# Patient Record
Sex: MALE | Race: WHITE | NOT HISPANIC OR LATINO | Employment: OTHER | URBAN - METROPOLITAN AREA
[De-identification: names, ages, dates, MRNs, and addresses within clinical notes are randomized per-mention and may not be internally consistent; named-entity substitution may affect disease eponyms.]

---

## 2017-01-24 ENCOUNTER — OFFICE VISIT (OUTPATIENT)
Dept: OPTOMETRY | Facility: CLINIC | Age: 67
End: 2017-01-24
Payer: MEDICARE

## 2017-01-24 DIAGNOSIS — H52.4 MYOPIA WITH ASTIGMATISM AND PRESBYOPIA, BILATERAL: ICD-10-CM

## 2017-01-24 DIAGNOSIS — H02.88A MEIBOMIAN GLAND DYSFUNCTION (MGD), BILATERAL, BOTH UPPER AND LOWER LIDS: ICD-10-CM

## 2017-01-24 DIAGNOSIS — Z13.5 GLAUCOMA SCREENING: ICD-10-CM

## 2017-01-24 DIAGNOSIS — H52.13 MYOPIA WITH ASTIGMATISM AND PRESBYOPIA, BILATERAL: ICD-10-CM

## 2017-01-24 DIAGNOSIS — H02.88B MEIBOMIAN GLAND DYSFUNCTION (MGD), BILATERAL, BOTH UPPER AND LOWER LIDS: ICD-10-CM

## 2017-01-24 DIAGNOSIS — H25.13 NUCLEAR SCLEROSIS, BILATERAL: ICD-10-CM

## 2017-01-24 DIAGNOSIS — H52.203 MYOPIA WITH ASTIGMATISM AND PRESBYOPIA, BILATERAL: ICD-10-CM

## 2017-01-24 DIAGNOSIS — I10 ESSENTIAL HYPERTENSION: Primary | ICD-10-CM

## 2017-01-24 PROCEDURE — 99499 UNLISTED E&M SERVICE: CPT | Mod: S$GLB,,, | Performed by: OPTOMETRIST

## 2017-01-24 PROCEDURE — 92014 COMPRE OPH EXAM EST PT 1/>: CPT | Mod: S$GLB,,, | Performed by: OPTOMETRIST

## 2017-01-24 PROCEDURE — 99999 PR PBB SHADOW E&M-EST. PATIENT-LVL II: CPT | Mod: PBBFAC,,, | Performed by: OPTOMETRIST

## 2017-01-24 PROCEDURE — 92015 DETERMINE REFRACTIVE STATE: CPT | Mod: S$GLB,,, | Performed by: OPTOMETRIST

## 2017-01-24 NOTE — PROGRESS NOTES
HPI     Hypertensive eye exam   MURRAY 01/20/2016   Mr. Dalton is here today to continue ocular health care for hypertension.  Pt sts has noticed decline in reading va OS since last eye exam also more   dryness than usual.  Glasses 1 yr old.  (-)Flashes (+)Floaters  (+)Itch, (+)tear, (+)burn, (+)Dryness.  (+) OTC Drops unknown AT's natural   tears?  (+)Photophobia  (-)Glare       Last edited by Filiberto Varela, OD on 1/24/2017  1:29 PM.         Assessment /Plan     For exam results, see Encounter Report.    Essential hypertension  -No retinopathy noted today.  Continued control with primary care physician and annual comprehensive eye exam.    Nuclear sclerosis, bilateral  -Educated patient on presence of cataracts at today's exam, monitor at annual dilated fundus exam. 8+ years surgical estimate.    Meibomian gland dysfunction (MGD), bilateral, both upper and lower lids  -Sysatne PRN    Glaucoma screening  -Monitor with annual eye exam and IOP check    Myopia with astigmatism and presbyopia, bilateral  Eyeglass Final Rx     Eyeglass Final Rx      Sphere Cylinder Axis Add   Right -1.75 +1.50 180 +2.50   Left -1.75 +1.00 180 +2.50       Type:  PAL    Expiration Date:  1/25/2018                  RTC 1 yr

## 2017-01-24 NOTE — MR AVS SNAPSHOT
[facility]  [address] [facility]  [place] [address]  Phone: [phone]  Fax: [phone]                  Sha Triplett   2017 1:00 PM   Office Visit    Description:  Male : 1950   Provider:  Filiberto Varela OD   Department:  [facility]           Reason for Visit     Hypertensive Eye Exam           Diagnoses this Visit        Comments    Essential hypertension    -  Primary     Nuclear sclerosis, bilateral         Meibomian gland dysfunction (MGD), bilateral, both upper and lower lids         Glaucoma screening         Myopia with astigmatism and presbyopia, bilateral                To Do List           Goals (5 Years of Data)     None      Follow-Up and Disposition     Return in about 1 year (around 2018).      [facility] On Call     [facility] On Call Nurse Care Line -  Assistance  Registered nurses in the [facility] On Call Center provide clinical advisement, health education, appointment booking, and other advisory services.  Call for this free service at [phone].             Medications           Message regarding Medications     Verify the changes and/or additions to your medication regime listed below are the same as discussed with your clinician today.  If any of these changes or additions are incorrect, please notify your healthcare provider.        STOP taking these medications     arm brace (WRIST BRACE MEDIUM) Misc 1 each by Misc.(Non-Drug; Combo Route) route as needed (when experiencing left wrist pain).    hydrOXYzine (ATARAX) 10 MG Tab Take 25 mg by mouth 3 (three) times daily as needed.    benazepril (LOTENSIN) 10 MG tablet Take 5 mg by mouth once daily. Pt. States he takes 1/2 tab of 10 mg, ie, 5 mg if his Systolic B/P is > 140 or 150.    ranitidine (ZANTAC) 300 MG capsule Take 300 mg by mouth every evening.           Verify that the below list of medications is an accurate representation of the medications you are currently taking.  If none reported,  the list may be blank. If incorrect, please contact your healthcare provider. Carry this list with you in case of emergency.           Current Medications     b complex vitamins tablet Take 1 tablet by mouth once daily.    cyanocobalamin, vitamin B-12, (VITAMIN B-12) 50 mcg tablet Take 50 mcg by mouth once daily.    fish oil-omega-3 fatty acids 300-1,000 mg capsule Take 2 g by mouth once daily.    milk thistle 175 mg tablet Take 175 mg by mouth once daily.    multivitamin capsule Take 1 capsule by mouth once daily.    PSYLLIUM SEED, WITH DEXTROSE, (CILIUM ORAL) Take by mouth.           Clinical Reference Information           Allergies as of 1/24/2017     No Known Allergies      Immunizations Administered on Date of Encounter - 1/24/2017     None      [facility] Sign-Up     Activating your [facility] account is as easy as 1-2-3!     1) Visit [email or web address], select Sign Up Now, enter this activation code and your date of birth, then select Next.  [email or web address]  Expires: 3/10/2017  1:30 PM      2) Create a username and password to use when you visit [facility] in the future and select a security question in case you lose your password and select Next.    3) Enter your e-mail address and click Sign Up!    Additional Information  If you have questions, please e-mail [email or web address] or call [phone] to talk to our [facility] staff. Remember, [facility] is NOT to be used for urgent needs. For medical emergencies, dial 911.

## 2017-02-06 ENCOUNTER — TELEPHONE (OUTPATIENT)
Dept: FAMILY MEDICINE | Facility: CLINIC | Age: 67
End: 2017-02-06

## 2017-02-06 NOTE — TELEPHONE ENCOUNTER
----- Message from Verito Connor sent at 2/6/2017  1:06 PM CST -----  Patient states he has been having ringing in his ear. He would like to know if he needs to see a specialist. Please call at [phone] thank you!

## 2017-02-06 NOTE — TELEPHONE ENCOUNTER
There are different causes to ringing in th ears. Most of the time it is due to damage in the ear. If he would like to be evaluated by a specialist and needs a referral, he would need an appointment. I have not seen him since 2/2015

## 2017-02-10 ENCOUNTER — OFFICE VISIT (OUTPATIENT)
Dept: FAMILY MEDICINE | Facility: CLINIC | Age: 67
End: 2017-02-10
Payer: MEDICARE

## 2017-02-10 VITALS
BODY MASS INDEX: 24.77 KG/M2 | DIASTOLIC BLOOD PRESSURE: 80 MMHG | HEART RATE: 65 BPM | SYSTOLIC BLOOD PRESSURE: 120 MMHG | WEIGHT: 176.94 LBS | TEMPERATURE: 98 F | OXYGEN SATURATION: 99 % | HEIGHT: 71 IN

## 2017-02-10 DIAGNOSIS — H93.13 TINNITUS AURIUM, BILATERAL: ICD-10-CM

## 2017-02-10 DIAGNOSIS — B18.2 CHRONIC HEPATITIS C WITHOUT HEPATIC COMA: ICD-10-CM

## 2017-02-10 DIAGNOSIS — I10 ESSENTIAL HYPERTENSION: Primary | ICD-10-CM

## 2017-02-10 PROCEDURE — 99213 OFFICE O/P EST LOW 20 MIN: CPT | Mod: S$GLB,,, | Performed by: FAMILY MEDICINE

## 2017-02-10 PROCEDURE — 3079F DIAST BP 80-89 MM HG: CPT | Mod: S$GLB,,, | Performed by: FAMILY MEDICINE

## 2017-02-10 PROCEDURE — 1157F ADVNC CARE PLAN IN RCRD: CPT | Mod: S$GLB,,, | Performed by: FAMILY MEDICINE

## 2017-02-10 PROCEDURE — 99999 PR PBB SHADOW E&M-EST. PATIENT-LVL III: CPT | Mod: PBBFAC,,, | Performed by: FAMILY MEDICINE

## 2017-02-10 PROCEDURE — 3074F SYST BP LT 130 MM HG: CPT | Mod: S$GLB,,, | Performed by: FAMILY MEDICINE

## 2017-02-10 PROCEDURE — 1159F MED LIST DOCD IN RCRD: CPT | Mod: S$GLB,,, | Performed by: FAMILY MEDICINE

## 2017-02-10 PROCEDURE — 1160F RVW MEDS BY RX/DR IN RCRD: CPT | Mod: S$GLB,,, | Performed by: FAMILY MEDICINE

## 2017-02-10 PROCEDURE — 99499 UNLISTED E&M SERVICE: CPT | Mod: S$GLB,,, | Performed by: FAMILY MEDICINE

## 2017-02-10 NOTE — PROGRESS NOTES
Routine Office Visit    Patient Name: Sha Triplett    : 1950  MRN: [id number]    Subjective:  Sha is a 66 y.o. male who presents today for     1. Tinnitus - started 1 week ago - pt states that he is unsure exactly when it started, but pt noticed it more approximately 1 week ago. Pt states that he notices the ringing more when it is quiet. Pt does not recall any recent trauma / injuries. 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 Pt states she sometimes feels his ear is clogged with fluid. He has been using alcohol in his ears to decrease the fluid. It seems to help a little. No trauma / injuries.     Review of Systems   Constitutional: Negative for chills and fever.   HENT: Negative for congestion.    Eyes: Negative for blurred vision.   Respiratory: Negative for cough.    Cardiovascular: Negative for chest pain.   Gastrointestinal: Negative for abdominal pain, constipation, diarrhea, heartburn, nausea and vomiting.   Genitourinary: Negative for dysuria.   Musculoskeletal: Negative for myalgias.   Skin: Negative for itching and rash.   Neurological: Negative for dizziness and headaches.   Psychiatric/Behavioral: Negative for depression.       Active Problem List  Patient Active Problem List   Diagnosis    Hypertension    Chronic hepatitis C without hepatic coma    Other symptoms involving digestive system(027.99)       Past Surgical History  Past Surgical History   Procedure Laterality Date    Liver biopsy      Colonoscopy w/ biopsies  3/4/15     / Dr. Ellis at [facility].       Family History  Family History   Problem Relation Age of Onset    Alzheimer's disease Mother     Macular degeneration Father        Social History  Social History     Social History    Marital status:      Spouse name: N/A    Number of children: N/A    Years of education: N/A     Occupational History    Not on file.     Social History Main Topics    Smoking status: Former Smoker     Packs/day: 0.25     "Smokeless tobacco: Not on file      Comment: 1 cigarette daily    Alcohol use No    Drug use: Not on file    Sexual activity: Not on file     Other Topics Concern    Not on file     Social History Narrative       Medications and Allergies  Reviewed and updated.       Physical Exam  Visit Vitals    /80 (BP Location: Right arm, Patient Position: Sitting, BP Method: Manual)    Pulse 65    Temp 97.8 °F (36.6 °C) (Oral)    Ht 5' 11" (1.803 m)    Wt 80.3 kg (176 lb 14.7 oz)    SpO2 99%    BMI 24.68 kg/m2     Physical Exam   Constitutional: He is oriented to person, place, and time. He appears well-developed and well-nourished.   HENT:   Head: Normocephalic and atraumatic.   Eyes: Conjunctivae and EOM are normal. Pupils are equal, round, and reactive to light.   Neck: Normal range of motion. Neck supple. No JVD present. No thyromegaly present.   Cardiovascular: Normal rate, regular rhythm and normal heart sounds.    Pulmonary/Chest: Effort normal and breath sounds normal. He has no wheezes.   Abdominal: Soft. Bowel sounds are normal. He exhibits no distension. There is no tenderness. There is no guarding.   Musculoskeletal: Normal range of motion.   Lymphadenopathy:     He has no cervical adenopathy.   Neurological: He is alert and oriented to person, place, and time.   Skin: Skin is warm and dry.   Psychiatric: He has a normal mood and affect. His behavior is normal.         Assessment/Plan:  Sha Triplett is a 66 y.o. male who presents today for :    Essential hypertension  The current medical regimen is effective;  continue present plan and medications.    Chronic hepatitis C without hepatic coma  Chronic condition for patient   Pt does not want harvoni treatment at this time     Tinnitus aurium, bilateral  Recommend to use white noise  Recommend to use antihistamine such as allegra, claritin, or zyrtec for nasal congestion / serous otitis media  May need referral to ENT if symptoms worsen / " progress        Return if symptoms worsen or fail to improve.

## 2017-02-10 NOTE — MR AVS SNAPSHOT
"    [facility]  [address] [facility]  [place] [address]  Phone: [phone]  Fax: [phone]                  Sha Triplett   2/10/2017 11:00 AM   Office Visit    Description:  Male : 1950   Provider:  Kirsten Portillo MD   Department:  [facility]           Reason for Visit     Ear Problem                To Do List           Goals (5 Years of Data)     None      [facility]     [facility] Nurse Care [facility] -  Assistance  Registered nurses in the [facility] On Call Center provide clinical advisement, health education, appointment booking, and other advisory services.  Call for this free service at [phone].             Medications           Message regarding Medications     Verify the changes and/or additions to your medication regime listed below are the same as discussed with your clinician today.  If any of these changes or additions are incorrect, please notify your healthcare provider.             Verify that the below list of medications is an accurate representation of the medications you are currently taking.  If none reported, the list may be blank. If incorrect, please contact your healthcare provider. Carry this list with you in case of emergency.           Current Medications     b complex vitamins tablet Take 1 tablet by mouth once daily.    cyanocobalamin, vitamin B-12, (VITAMIN B-12) 50 mcg tablet Take 50 mcg by mouth once daily.    fish oil-omega-3 fatty acids 300-1,000 mg capsule Take 2 g by mouth once daily.    milk thistle 175 mg tablet Take 175 mg by mouth once daily.    multivitamin capsule Take 1 capsule by mouth once daily.    PSYLLIUM SEED, WITH DEXTROSE, (CILIUM ORAL) Take by mouth.           Clinical Reference Information           Your Vitals Were     BP Pulse Temp Height    120/80 (BP Location: Right arm, Patient Position: Sitting, BP Method: Manual) 65 97.8 °F (36.6 °C) (Oral) 5' 11" (1.803 m)    Weight SpO2 BMI    80.3 kg (176 lb 14.7 " oz) 99% 24.68 kg/m2      Blood Pressure          Most Recent Value    BP  120/80      Allergies as of 2/10/2017     No Known Allergies      Immunizations Administered on Date of Encounter - 2/10/2017     None      [facility] Sign-Up     Activating your [facility] account is as easy as 1-2-3!     1) Visit [email or web address], select Sign Up Now, enter this activation code and your date of birth, then select Next.  [email or web address]  Expires: 3/10/2017  1:30 PM      2) Create a username and password to use when you visit [facility] in the future and select a security question in case you lose your password and select Next.    3) Enter your e-mail address and click Sign Up!    Additional Information  If you have questions, please e-mail [email or web address] or call [phone] to talk to our [facility] staff. Remember, [facility] is NOT to be used for urgent needs. For medical emergencies, dial 911.         Instructions      Tinnitus (Ringing in the Ears)     Treatment may include maskers and hearing aids.     Tinnitus is the term for a noise in your ear not caused by an outside sound. The noise might be a ringing, clicking, hiss, or roar. It can vary in pitch and may be soft or quite loud. For some people, tinnitus is a minor nuisance. But for others, the noise can make it hard to hear, work, and even sleep. When tinnitus can't be cured, a number of treatments may offer relief.  What causes tinnitus?  Loud noises, hearing loss, and ear wax can cause tinnitus. So can certain medicines. Large amounts of aspirin or caffeine are sometimes to blame. In many cases, the exact cause of tinnitus is unknown.  How is tinnitus treated?  Identifying and removing the cause is the best way to treat tinnitus. For that reason, your healthcare provider may refer you to an otolaryngologist (ear, nose, and throat doctor). Your hearing may also be checked by an audiologist (hearing specialist). If you have hearing loss, wearing a hearing aid may  help your tinnitus. When the cause can't be found, the tinnitus itself may be treated. Some of the treatments are listed below, and your healthcare provider can tell you more about them:  · Maskers are small devices that look like hearing aids. They emit a pleasant sound that helps cover up the ringing in your ears. Hearing aids and maskers are sometimes used together.  · Medicines that treat anxiety and depression may ease tinnitus in some people.  · Hypnosis or relaxation therapy may help head noise seem less severe.  · Tinnitus retraining therapy combines counseling and maskers. Both can help take your mind off the tinnitus.  For more information  · American Speech-Hearing-Language Association 972-596-8523 www.ruthie.org  · American Tinnitus Association 404-069-2843 www.iglesia.org  · National [place] on Deafness and other Communication Disorders 524-407-0064 www.nidcd.nih.gov   Date Last Reviewed: 7/1/2016  © 9131-5958 [employer]. [address] [place] [address]. All rights reserved. This information is not intended as a substitute for professional medical care. Always follow your healthcare professional's instructions.             Language Assistance Services     ATTENTION: Language assistance services are available, free of charge. Please call [phone].      ATENCIÓN: Si habla tanoañol, tiene a alejo disposición servicios gratuitos de asistencia lingüística. Llame al [phone].     TACHO Ý: N?u b?n nói Ti?ng Vi?t, có các d?ch v? h? tr? ngôn ng? mi?n phí dành cho b?n. G?i s? [phone].         [facility] complies with applicable Federal civil rights laws and does not discriminate on the basis of race, color, national origin, age, disability, or sex.

## 2017-02-10 NOTE — PATIENT INSTRUCTIONS
Tinnitus (Ringing in the Ears)     Treatment may include maskers and hearing aids.     Tinnitus is the term for a noise in your ear not caused by an outside sound. The noise might be a ringing, clicking, hiss, or roar. It can vary in pitch and may be soft or quite loud. For some people, tinnitus is a minor nuisance. But for others, the noise can make it hard to hear, work, and even sleep. When tinnitus can't be cured, a number of treatments may offer relief.  What causes tinnitus?  Loud noises, hearing loss, and ear wax can cause tinnitus. So can certain medicines. Large amounts of aspirin or caffeine are sometimes to blame. In many cases, the exact cause of tinnitus is unknown.  How is tinnitus treated?  Identifying and removing the cause is the best way to treat tinnitus. For that reason, your healthcare provider may refer you to an otolaryngologist (ear, nose, and throat doctor). Your hearing may also be checked by an audiologist (hearing specialist). If you have hearing loss, wearing a hearing aid may help your tinnitus. When the cause can't be found, the tinnitus itself may be treated. Some of the treatments are listed below, and your healthcare provider can tell you more about them:  · Maskers are small devices that look like hearing aids. They emit a pleasant sound that helps cover up the ringing in your ears. Hearing aids and maskers are sometimes used together.  · Medicines that treat anxiety and depression may ease tinnitus in some people.  · Hypnosis or relaxation therapy may help head noise seem less severe.  · Tinnitus retraining therapy combines counseling and maskers. Both can help take your mind off the tinnitus.  For more information  · American Speech-Hearing-Language Association 928-225-1854 www.ruthie.org  · American Tinnitus Association 412-711-7053 www.iglesia.org  · National [place] on Deafness and other Communication Disorders 321-772-7244 www.nidcd.nih.gov   Date Last Reviewed: 7/1/2016  ©  5136-1912 [employer]. [address] [place] [address], [place] [address]. All rights reserved. This information is not intended as a substitute for professional medical care. Always follow your healthcare professional's instructions.

## 2017-02-27 ENCOUNTER — TELEPHONE (OUTPATIENT)
Dept: FAMILY MEDICINE | Facility: CLINIC | Age: 67
End: 2017-02-27

## 2017-02-27 DIAGNOSIS — H93.19 TINNITUS, UNSPECIFIED LATERALITY: Primary | ICD-10-CM

## 2017-02-27 NOTE — TELEPHONE ENCOUNTER
----- Message from Refugio Fontenot sent at 2/27/2017 11:23 AM CST -----  Pt states on his last visit with dr tsai, if he still having ringing in his ears that she can refer him to a specialist. The pt asked that someone call him in ref of that.

## 2017-03-06 ENCOUNTER — TELEPHONE (OUTPATIENT)
Dept: FAMILY MEDICINE | Facility: CLINIC | Age: 67
End: 2017-03-06

## 2017-03-06 ENCOUNTER — OFFICE VISIT (OUTPATIENT)
Dept: OPTOMETRY | Facility: CLINIC | Age: 67
End: 2017-03-06
Payer: MEDICARE

## 2017-03-06 DIAGNOSIS — H52.13 MYOPIA WITH ASTIGMATISM AND PRESBYOPIA, BILATERAL: Primary | ICD-10-CM

## 2017-03-06 DIAGNOSIS — H52.4 MYOPIA WITH ASTIGMATISM AND PRESBYOPIA, BILATERAL: Primary | ICD-10-CM

## 2017-03-06 DIAGNOSIS — H52.203 MYOPIA WITH ASTIGMATISM AND PRESBYOPIA, BILATERAL: Primary | ICD-10-CM

## 2017-03-06 PROCEDURE — 99999 PR PBB SHADOW E&M-EST. PATIENT-LVL II: CPT | Mod: PBBFAC,,, | Performed by: OPTOMETRIST

## 2017-03-06 PROCEDURE — 99499 UNLISTED E&M SERVICE: CPT | Mod: S$GLB,,, | Performed by: OPTOMETRIST

## 2017-03-06 NOTE — TELEPHONE ENCOUNTER
Patient has an appointment 3/8/17 2:30pm with Dr. Pagan at [facility], patient was notified.

## 2017-03-06 NOTE — PROGRESS NOTES
HPI     Concerns About Ocular Health    Additional comments: Glasses check           Comments   DLS: 01/24/2017 Dr. Varela    Patient states he has been having problems seeing out of his new glasses.   He states it has been a couple of weeks since he has been using the specs.   Can't see well at a distance at night. He says he can see clearer out of   the older pair of glasses. He states he had a the  on the WB to   read his glasses and everything checked out fine with the RX. He was told   to give it a few weeks and to make an appt if he is still having problems.          Last edited by Ena Pantoja on 3/6/2017  2:04 PM. (History)            Assessment /Plan     For exam results, see Encounter Report.    Myopia with astigmatism and presbyopia, bilateral  Eyeglass Final Rx     Eyeglass Final Rx      Sphere Cylinder Axis Add   Right -2.00 +1.75 180 +2.50   Left -2.00 +1.00 180 +2.50       Type:  Bifocal    Expiration Date:  3/7/2018    Robyn Gandhi                  [facility] annual

## 2017-03-06 NOTE — TELEPHONE ENCOUNTER
----- Message from Edel Herndon sent at 3/6/2017  8:34 AM CST -----  Contact: gama  Pt is requesting a referral for ringing in ear per last OV. [phone]            Thanks

## 2017-03-22 ENCOUNTER — OFFICE VISIT (OUTPATIENT)
Dept: FAMILY MEDICINE | Facility: CLINIC | Age: 67
End: 2017-03-22
Payer: MEDICARE

## 2017-03-22 ENCOUNTER — HOSPITAL ENCOUNTER (OUTPATIENT)
Dept: RADIOLOGY | Facility: HOSPITAL | Age: 67
Discharge: HOME | End: 2017-03-22
Attending: NURSE PRACTITIONER
Payer: MEDICARE

## 2017-03-22 ENCOUNTER — TELEPHONE (OUTPATIENT)
Dept: FAMILY MEDICINE | Facility: CLINIC | Age: 67
End: 2017-03-22

## 2017-03-22 VITALS
HEART RATE: 71 BPM | SYSTOLIC BLOOD PRESSURE: 130 MMHG | HEIGHT: 71 IN | OXYGEN SATURATION: 99 % | WEIGHT: 175.81 LBS | BODY MASS INDEX: 24.61 KG/M2 | TEMPERATURE: 98 F | DIASTOLIC BLOOD PRESSURE: 80 MMHG

## 2017-03-22 DIAGNOSIS — K59.00 CONSTIPATION, UNSPECIFIED CONSTIPATION TYPE: ICD-10-CM

## 2017-03-22 DIAGNOSIS — K59.00 CONSTIPATION, UNSPECIFIED CONSTIPATION TYPE: Primary | ICD-10-CM

## 2017-03-22 PROCEDURE — 1160F RVW MEDS BY RX/DR IN RCRD: CPT | Mod: S$GLB,,, | Performed by: NURSE PRACTITIONER

## 2017-03-22 PROCEDURE — 74020 XR ABDOMEN FLAT AND ERECT: CPT | Mod: TC,PO

## 2017-03-22 PROCEDURE — 1159F MED LIST DOCD IN RCRD: CPT | Mod: S$GLB,,, | Performed by: NURSE PRACTITIONER

## 2017-03-22 PROCEDURE — 99999 PR PBB SHADOW E&M-EST. PATIENT-LVL III: CPT | Mod: PBBFAC,,, | Performed by: NURSE PRACTITIONER

## 2017-03-22 PROCEDURE — 1126F AMNT PAIN NOTED NONE PRSNT: CPT | Mod: S$GLB,,, | Performed by: NURSE PRACTITIONER

## 2017-03-22 PROCEDURE — 99214 OFFICE O/P EST MOD 30 MIN: CPT | Mod: S$GLB,,, | Performed by: NURSE PRACTITIONER

## 2017-03-22 PROCEDURE — 1157F ADVNC CARE PLAN IN RCRD: CPT | Mod: S$GLB,,, | Performed by: NURSE PRACTITIONER

## 2017-03-22 PROCEDURE — 74020 XR ABDOMEN FLAT AND ERECT: CPT | Mod: 26,,, | Performed by: RADIOLOGY

## 2017-03-22 PROCEDURE — 3075F SYST BP GE 130 - 139MM HG: CPT | Mod: S$GLB,,, | Performed by: NURSE PRACTITIONER

## 2017-03-22 PROCEDURE — 3079F DIAST BP 80-89 MM HG: CPT | Mod: S$GLB,,, | Performed by: NURSE PRACTITIONER

## 2017-03-22 NOTE — PATIENT INSTRUCTIONS
Constipation (Adult)  Constipation means that you have bowel movements that are less frequent than usual. Stools often become very hard and difficult to pass.  Constipation is very common. At some point in life it affects almost everyone. Since everyone's bowel habits are different, what is constipation to one person may not be to another. Your healthcare provider may do tests to diagnose constipation. It depends on what he or she finds when evaluating you.    Symptoms of constipation include:  · Abdominal pain  · Bloating  · Vomiting  · Painful bowel movements  · Itching, swelling, bleeding, or pain around the anus  Causes  Constipation can have many causes. These include:  · Diet low in fiber  · Too much dairy  · Not drinking enough liquids  · Lack of exercise or physical activity. This is especially true for older adults.  · Changes in lifestyle or daily routine, including pregnancy, aging, work, and travel  · Frequent use or misuse of laxatives  · Ignoring the urge to have a bowel movement or delaying it until later  · Medicines, such as certain prescription pain medicines, iron supplements, antacids, certain antidepressants, and calcium supplements  · Diseases like irritable bowel syndrome, bowel obstructions, stroke, diabetes, thyroid disease, Parkinson disease, hemorrhoids, and colon cancer  Complications  Potential complications of constipation can include:  · Hemorrhoids  · Rectal bleeding from hemorrhoids or anal fissures (skin tears)  · Hernias  · Dependency on laxatives  · Chronic constipation  · Fecal impaction  · Bowel obstruction or perforation  Home care  All treatment should be done after talking with your healthcare provider. This is especially true if you have another medical problems, are taking prescription medicines, or are an older adult. Treatment most often involves lifestyle changes. You may also need medicines. Your healthcare provider will tell you which will work best for you. Follow  the advice below to help avoid this problem in the future.  Lifestyle changes  These lifestyle changes can help prevent constipation:  · Diet. Eat a high-fiber diet, with fresh fruit and vegetables, and reduce dairy intake, meats, and processed foods  · Fluids. It's important to get enough fluids each day. Drink plenty of water when you eat more fiber. If you are on diet that limits the amount of fluid you can have, talk about this with your healthcare provider.  · Regular exercise. Check with your healthcare provider first.  Medications  Take any medicines as directed. Some laxatives are safe to use only every now and then. Others can be taken on a regular basis. Talk with your doctor or pharmacist if you have questions.  Prescription pain medicines can cause constipation. If you are taking this kind of medicine, ask your healthcare provider if you should also take a stool softener.  Medicines you may take to treat constipation include:  · Fiber supplements  · Stool softeners  · Laxatives  · Enemas  · Rectal suppositories  Follow-up care  Follow up with your healthcare provider if symptoms don't get better in the next few days. You may need to have more tests or see a specialist.  Call 911  Call 911 if any of these occur:  · Trouble breathing  · Stiff, rigid abdomen that is severely painful to touch  · Confusion  · Fainting or loss of consciousness  · Rapid heart rate  · Chest pain  When to seek medical advice  Call your healthcare provider right away if any of these occur:  · Fever over 100.4°F (38°C)  · Failure to resume normal bowel movements  · Pain in your abdomen or back gets worse  · Nausea or vomiting  · Swelling in your abdomen  · Blood in the stool  · Black, tarry stool  · Involuntary weight loss  · Weakness  Date Last Reviewed: 12/30/2015  © 6876-6285 [employer]. [address] [place] [address], [place] [address]. All rights reserved. This information is not intended as a substitute for  professional medical care. Always follow your healthcare professional's instructions.        Treating Constipation    Constipation is a common and often uncomfortable problem. Constipation means you have bowel movements fewer than 3 times per week, or strain to pass hard, dry stool. It can last a short time. Or it can be a problem that never seems to go away. The good news is that it can often be treated and controlled.  Eat more fiber  One of the best ways to help treat constipation is to increase your fiber intake. You can do this either through diet or by using fiber supplements. Fiber (in whole grains, fruits, and vegetables) adds bulk and absorbs water to soften the stool. This helps the stool pass through the colon more easily. When you increase your fiber intake, do it slowly to avoid side effects such as bloating. Also increase the amount of water that you drink. Eating more of the following foods can add fiber to your diet.  · High-fiber cereals  · Whole grains, bran, and brown rice  · Vegetables such as carrots, broccoli, and greens  · Fresh fruits (especially apples, pears, and dried fruits like raisins and apricots)  · Nuts and legumes (especially beans such as lentils, kidney beans, and lima beans)  Get physically active  Exercise helps improve the working of your colon which helps ease constipation. Try to get some physical activity every day. If you havent been active for a while, talk to your healthcare provider before starting again.  Laxatives  Your healthcare provider may suggest an over-the-counter product to help ease your constipation. He or she may suggest the use of bulk-forming agents or laxatives. The use of laxatives, if used as directed, is common and safe. Follow directions carefully when using them. See your healthcare provider for new-onset constipation, or long-term constipation, to rule out other causes such as medicines or thyroid disease.  Date Last Reviewed: 7/1/2016  © 7327-8987  [employer]. [address] [place] [address], [place] [address]. All rights reserved. This information is not intended as a substitute for professional medical care. Always follow your healthcare professional's instructions.        Eating a High-Fiber Diet  Fiber is what gives strength and structure to plants. Most grains, beans, vegetables, and fruits contain fiber. Foods rich in fiber are often low in calories and fat, and they fill you up more. They may also reduce your risks for certain health problems. To find out the amount of fiber in canned, packaged, or frozen foods, read the Nutrition Facts label. It tells you how much fiber is in a serving.    Types of fiber and their benefits  There are two types of fiber: insoluble and soluble. They both aid digestion and help you maintain a healthy weight.  · Insoluble fiber. This is found in whole grains, cereals, certain fruits and vegetables such as apple skin, corn, and carrots. Insoluble fiber may prevent constipation and reduce the risk for certain types of cancer.  · Soluble fiber. This type of fiber is in oats, beans, and certain fruits and vegetables such as strawberries and peas. Soluble fiber can reduce cholesterol, which may help lower the risk for heart disease. It also helps control blood sugar levels.  Look for high-fiber foods  Try these foods to add fiber to your diet:  · Whole-grain breads and cereals. Try to eat 6 to 8 ounces a day. Include wheat and oat bran cereals, whole-wheat muffins or toast, and corn tortillas in your meals.  · Fruits. Try to eat 2 cups a day. Apples, oranges, strawberries, pears, and bananas are good sources. (Note: Fruit juice is low in fiber.)  · Vegetables. Try to eat at least 2.5 cups a day. Add asparagus, carrots, broccoli, peas, and corn to your meals.  · Beans. One cup of cooked lentils gives you over 15 grams of fiber. Try navy beans, lentils, and chickpeas.  · Seeds. A small handful of seeds gives you about 3  grams of fiber. Try sunflower seeds.  Keep track of your fiber  Keep track of how much fiber you eat. Start by reading food labels. Then eat a variety of foods high in fiber. As you begin to eat more fiber, ask your healthcare provider how much water you should be drinking to keep your digestive system working smoothly.  You should aim for a certain amount of fiber in your diet each day. If you are a woman, that amount is between 25 and 28 grams per day. Men should aim for 30 to 33 grams per day. After age 50, your daily fiber needs drop to 22 grams for women and 28 grams for men.  Before you reach for the fiber supplements, think about this. Fiber is found naturally in healthy whole foods. It gives you that feeling of fullness after you eat. Taking fiber supplements or eating fiber-enriched foods will not give you this full feeling.  Your fiber intake is a good measure for the quality of your overall diet. If you are missing out on your daily amount of fiber, you may be lacking other important nutrients as well.  Date Last Reviewed: 5/11/2015 © 2000-2016 [employer]. [address] [place] [address], [place] [address]. All rights reserved. This information is not intended as a substitute for professional medical care. Always follow your healthcare professional's instructions.

## 2017-03-22 NOTE — PROGRESS NOTES
Subjective:       Patient ID: Sha Triplett is a 66 y.o. male.    Chief Complaint: Abdominal Pain (right lower quad started yesterday/ occassionally ); Constipation (no bowel movement in three days); and Headache    Abdominal Pain   This is a new problem. The current episode started yesterday. The onset quality is gradual. The pain is located in the RLQ. The pain is at a severity of 8/10. The quality of the pain is sharp. The abdominal pain radiates to the RLQ. Associated symptoms include belching, constipation and flatus. Pertinent negatives include no arthralgias, diarrhea, dysuria, frequency, hematochezia, hematuria, melena or myalgias. Nothing aggravates the pain. The pain is relieved by nothing. He has tried oral narcotic analgesics (left over vicodin) for the symptoms. The treatment provided moderate relief. There is no history of abdominal surgery, colon cancer, Crohn's disease, gallstones, GERD, irritable bowel syndrome, pancreatitis, PUD or ulcerative colitis.   Constipation   The current episode started in the past 7 days. The problem is unchanged. The stool is described as firm. The patient is on a high fiber diet. He exercises regularly. There has been adequate water intake. Associated symptoms include bloating and flatus. Pertinent negatives include no diarrhea, difficulty urinating, fecal incontinence, hematochezia, hemorrhoids, melena or rectal pain. Treatments tried: metamucil. The treatment provided no relief. There is no history of abdominal surgery, endocrine disease, inflammatory bowel disease, irritable bowel syndrome, metabolic disease, neurologic disease, neuromuscular disease, psychiatric history or radiation treatment.       Past Medical History:   Diagnosis Date    Tobacco dependence        Social History     Social History    Marital status:      Spouse name: N/A    Number of children: N/A    Years of education: N/A     Occupational History    Not on file.     Social History  "Main Topics    Smoking status: Former Smoker     Packs/day: 0.25    Smokeless tobacco: Not on file      Comment: 1 cigarette daily    Alcohol use No    Drug use: Not on file    Sexual activity: Not on file     Other Topics Concern    Not on file     Social History Narrative       Past Surgical History:   Procedure Laterality Date    COLONOSCOPY W/ BIOPSIES  3/4/15    / Dr. Ellis at [facility].    LIVER BIOPSY  1998       Review of Systems   Gastrointestinal: Positive for bloating, constipation and flatus. Negative for diarrhea, hematochezia, hemorrhoids, melena and rectal pain.   Genitourinary: Negative for difficulty urinating, dysuria, frequency and hematuria.   Musculoskeletal: Negative for arthralgias and myalgias.       Objective:   /80 (BP Location: Right arm, Patient Position: Sitting, BP Method: Manual)  Pulse 71  Temp 98 °F (36.7 °C) (Oral)   Ht 5' 11" (1.803 m)  Wt 79.8 kg (175 lb 13.1 oz)  SpO2 99%  BMI 24.52 kg/m2     Physical Exam   Constitutional: He is oriented to person, place, and time. He appears well-developed and well-nourished.   HENT:   Head: Normocephalic and atraumatic.   Nose: Nose normal.   Eyes: Lids are normal. Lids are everted and swept, no foreign bodies found.   Cardiovascular: Normal rate, regular rhythm, S1 normal, S2 normal and normal heart sounds.    Pulmonary/Chest: Effort normal and breath sounds normal.   Abdominal: Soft. Bowel sounds are normal. He exhibits no distension and no mass. There is no tenderness. There is no rebound and no guarding. No hernia.   Neurological: He is alert and oriented to person, place, and time.   Skin: Skin is warm, dry and intact. He is not diaphoretic. No pallor.       Assessment:       1. Constipation, unspecified constipation type        Plan:       Sha was seen today for abdominal pain, constipation and headache.    Diagnoses and all orders for this visit:    Constipation, unspecified constipation type  -     X-Ray Abdomen Flat " And Erect; Future    X-ray normal.   Will follow up after results available.  Return if symptoms worsen or fail to improve.

## 2017-03-22 NOTE — MR AVS SNAPSHOT
[facility]  [address] [facility]  [place] [address]  Phone: [phone]  Fax: [phone]                  Sha Triplett   3/22/2017 12:40 PM   Office Visit    Description:  Male : 1950   Provider:  ABE Healy   Department:  [facility]           Reason for Visit     Abdominal Pain     Constipation     Headache           Diagnoses this Visit        Comments    Constipation, unspecified constipation type    -  Primary            To Do List           Goals (5 Years of Data)     None      Follow-Up and Disposition     Return if symptoms worsen or fail to improve.      [facility]     [facility] -  Assistance  Registered nurses in the [facility] provide clinical advisement, health education, appointment booking, and other advisory services.  Call for this free service at [phone].             Medications           Message regarding Medications     Verify the changes and/or additions to your medication regime listed below are the same as discussed with your clinician today.  If any of these changes or additions are incorrect, please notify your healthcare provider.             Verify that the below list of medications is an accurate representation of the medications you are currently taking.  If none reported, the list may be blank. If incorrect, please contact your healthcare provider. Carry this list with you in case of emergency.           Current Medications     b complex vitamins tablet Take 1 tablet by mouth once daily.    cyanocobalamin, vitamin B-12, (VITAMIN B-12) 50 mcg tablet Take 50 mcg by mouth once daily.    fish oil-omega-3 fatty acids 300-1,000 mg capsule Take 2 g by mouth once daily.    milk thistle 175 mg tablet Take 175 mg by mouth once daily.    multivitamin capsule Take 1 capsule by mouth once daily.    PSYLLIUM SEED, WITH DEXTROSE, (CILIUM ORAL) Take by mouth.           Clinical Reference  "Information           Your Vitals Were     BP Pulse Temp Height Weight SpO2    130/80 (BP Location: Right arm, Patient Position: Sitting, BP Method: Manual) 71 98 °F (36.7 °C) (Oral) 5' 11" (1.803 m) 79.8 kg (175 lb 13.1 oz) 99%    BMI                24.52 kg/m2          Blood Pressure          Most Recent Value    BP  130/80      Allergies as of 3/22/2017     No Known Allergies      Immunizations Administered on Date of Encounter - 3/22/2017     None      Orders Placed During Today's Visit     Future Labs/Procedures Expected by Expires    X-Ray Abdomen Flat And Erect  3/22/2017 3/22/2018      [facility] Sign-Up     Activating your [facility] account is as easy as 1-2-3!     1) Visit [email or web address], select Sign Up Now, enter this activation code and your date of birth, then select Next.  [email or web address]  Expires: 5/6/2017 12:41 PM      2) Create a username and password to use when you visit [facility] in the future and select a security question in case you lose your password and select Next.    3) Enter your e-mail address and click Sign Up!    Additional Information  If you have questions, please e-mail [email or web address] or call [phone] to talk to our [facility] staff. Remember, [facility] is NOT to be used for urgent needs. For medical emergencies, dial 911.         Instructions      Constipation (Adult)  Constipation means that you have bowel movements that are less frequent than usual. Stools often become very hard and difficult to pass.  Constipation is very common. At some point in life it affects almost everyone. Since everyone's bowel habits are different, what is constipation to one person may not be to another. Your healthcare provider may do tests to diagnose constipation. It depends on what he or she finds when evaluating you.    Symptoms of constipation include:  · Abdominal pain  · Bloating  · Vomiting  · Painful bowel movements  · Itching, swelling, bleeding, or pain around the " anus  Causes  Constipation can have many causes. These include:  · Diet low in fiber  · Too much dairy  · Not drinking enough liquids  · Lack of exercise or physical activity. This is especially true for older adults.  · Changes in lifestyle or daily routine, including pregnancy, aging, work, and travel  · Frequent use or misuse of laxatives  · Ignoring the urge to have a bowel movement or delaying it until later  · Medicines, such as certain prescription pain medicines, iron supplements, antacids, certain antidepressants, and calcium supplements  · Diseases like irritable bowel syndrome, bowel obstructions, stroke, diabetes, thyroid disease, Parkinson disease, hemorrhoids, and colon cancer  Complications  Potential complications of constipation can include:  · Hemorrhoids  · Rectal bleeding from hemorrhoids or anal fissures (skin tears)  · Hernias  · Dependency on laxatives  · Chronic constipation  · Fecal impaction  · Bowel obstruction or perforation  Home care  All treatment should be done after talking with your healthcare provider. This is especially true if you have another medical problems, are taking prescription medicines, or are an older adult. Treatment most often involves lifestyle changes. You may also need medicines. Your healthcare provider will tell you which will work best for you. Follow the advice below to help avoid this problem in the future.  Lifestyle changes  These lifestyle changes can help prevent constipation:  · Diet. Eat a high-fiber diet, with fresh fruit and vegetables, and reduce dairy intake, meats, and processed foods  · Fluids. It's important to get enough fluids each day. Drink plenty of water when you eat more fiber. If you are on diet that limits the amount of fluid you can have, talk about this with your healthcare provider.  · Regular exercise. Check with your healthcare provider first.  Medications  Take any medicines as directed. Some laxatives are safe to use only every now  and then. Others can be taken on a regular basis. Talk with your doctor or pharmacist if you have questions.  Prescription pain medicines can cause constipation. If you are taking this kind of medicine, ask your healthcare provider if you should also take a stool softener.  Medicines you may take to treat constipation include:  · Fiber supplements  · Stool softeners  · Laxatives  · Enemas  · Rectal suppositories  Follow-up care  Follow up with your healthcare provider if symptoms don't get better in the next few days. You may need to have more tests or see a specialist.  Call 911  Call 911 if any of these occur:  · Trouble breathing  · Stiff, rigid abdomen that is severely painful to touch  · Confusion  · Fainting or loss of consciousness  · Rapid heart rate  · Chest pain  When to seek medical advice  Call your healthcare provider right away if any of these occur:  · Fever over 100.4°F (38°C)  · Failure to resume normal bowel movements  · Pain in your abdomen or back gets worse  · Nausea or vomiting  · Swelling in your abdomen  · Blood in the stool  · Black, tarry stool  · Involuntary weight loss  · Weakness  Date Last Reviewed: 12/30/2015  © 8374-3016 [employer]. [address] [place] [address]. All rights reserved. This information is not intended as a substitute for professional medical care. Always follow your healthcare professional's instructions.        Treating Constipation    Constipation is a common and often uncomfortable problem. Constipation means you have bowel movements fewer than 3 times per week, or strain to pass hard, dry stool. It can last a short time. Or it can be a problem that never seems to go away. The good news is that it can often be treated and controlled.  Eat more fiber  One of the best ways to help treat constipation is to increase your fiber intake. You can do this either through diet or by using fiber supplements. Fiber (in whole grains, fruits, and  vegetables) adds bulk and absorbs water to soften the stool. This helps the stool pass through the colon more easily. When you increase your fiber intake, do it slowly to avoid side effects such as bloating. Also increase the amount of water that you drink. Eating more of the following foods can add fiber to your diet.  · High-fiber cereals  · Whole grains, bran, and brown rice  · Vegetables such as carrots, broccoli, and greens  · Fresh fruits (especially apples, pears, and dried fruits like raisins and apricots)  · Nuts and legumes (especially beans such as lentils, kidney beans, and lima beans)  Get physically active  Exercise helps improve the working of your colon which helps ease constipation. Try to get some physical activity every day. If you havent been active for a while, talk to your healthcare provider before starting again.  Laxatives  Your healthcare provider may suggest an over-the-counter product to help ease your constipation. He or she may suggest the use of bulk-forming agents or laxatives. The use of laxatives, if used as directed, is common and safe. Follow directions carefully when using them. See your healthcare provider for new-onset constipation, or long-term constipation, to rule out other causes such as medicines or thyroid disease.  Date Last Reviewed: 7/1/2016  © 0307-1715 [employer]. [address] [place] [address], [place] [address]. All rights reserved. This information is not intended as a substitute for professional medical care. Always follow your healthcare professional's instructions.        Eating a High-Fiber Diet  Fiber is what gives strength and structure to plants. Most grains, beans, vegetables, and fruits contain fiber. Foods rich in fiber are often low in calories and fat, and they fill you up more. They may also reduce your risks for certain health problems. To find out the amount of fiber in canned, packaged, or frozen foods, read the Nutrition Facts label. It  tells you how much fiber is in a serving.    Types of fiber and their benefits  There are two types of fiber: insoluble and soluble. They both aid digestion and help you maintain a healthy weight.  · Insoluble fiber. This is found in whole grains, cereals, certain fruits and vegetables such as apple skin, corn, and carrots. Insoluble fiber may prevent constipation and reduce the risk for certain types of cancer.  · Soluble fiber. This type of fiber is in oats, beans, and certain fruits and vegetables such as strawberries and peas. Soluble fiber can reduce cholesterol, which may help lower the risk for heart disease. It also helps control blood sugar levels.  Look for high-fiber foods  Try these foods to add fiber to your diet:  · Whole-grain breads and cereals. Try to eat 6 to 8 ounces a day. Include wheat and oat bran cereals, whole-wheat muffins or toast, and corn tortillas in your meals.  · Fruits. Try to eat 2 cups a day. Apples, oranges, strawberries, pears, and bananas are good sources. (Note: Fruit juice is low in fiber.)  · Vegetables. Try to eat at least 2.5 cups a day. Add asparagus, carrots, broccoli, peas, and corn to your meals.  · Beans. One cup of cooked lentils gives you over 15 grams of fiber. Try navy beans, lentils, and chickpeas.  · Seeds. A small handful of seeds gives you about 3 grams of fiber. Try sunflower seeds.  Keep track of your fiber  Keep track of how much fiber you eat. Start by reading food labels. Then eat a variety of foods high in fiber. As you begin to eat more fiber, ask your healthcare provider how much water you should be drinking to keep your digestive system working smoothly.  You should aim for a certain amount of fiber in your diet each day. If you are a woman, that amount is between 25 and 28 grams per day. Men should aim for 30 to 33 grams per day. After age 50, your daily fiber needs drop to 22 grams for women and 28 grams for men.  Before you reach for the fiber  supplements, think about this. Fiber is found naturally in healthy whole foods. It gives you that feeling of fullness after you eat. Taking fiber supplements or eating fiber-enriched foods will not give you this full feeling.  Your fiber intake is a good measure for the quality of your overall diet. If you are missing out on your daily amount of fiber, you may be lacking other important nutrients as well.  Date Last Reviewed: 5/11/2015 © 2000-2016 [employer]. [address] [place] [address], [place] [address]. All rights reserved. This information is not intended as a substitute for professional medical care. Always follow your healthcare professional's instructions.             Language Assistance Services     ATTENTION: Language assistance services are available, free of charge. Please call [phone].      ATENCIÓN: Si radha janiya, tiene a alejo disposición servicios gratuitos de asistencia lingüística. Llame al [phone].     CHÚ Ý: N?u b?n nói Ti?ng Vi?t, có các d?ch v? h? tr? ngôn ng? mi?n phí dành cho b?n. G?i s? [phone].         [facility] complies with applicable Federal civil rights laws and does not discriminate on the basis of race, color, national origin, age, disability, or sex.

## 2017-03-22 NOTE — TELEPHONE ENCOUNTER
----- Message from ABE Healy sent at 3/22/2017  2:06 PM CDT -----  Please inform patient his xray was normal. He does not have any bowel obstruction, ileus or perforation.

## 2017-03-29 ENCOUNTER — OFFICE VISIT (OUTPATIENT)
Dept: FAMILY MEDICINE | Facility: CLINIC | Age: 67
End: 2017-03-29
Payer: MEDICARE

## 2017-03-29 VITALS
DIASTOLIC BLOOD PRESSURE: 70 MMHG | SYSTOLIC BLOOD PRESSURE: 120 MMHG | TEMPERATURE: 98 F | OXYGEN SATURATION: 99 % | BODY MASS INDEX: 24.52 KG/M2 | WEIGHT: 171.31 LBS | HEART RATE: 79 BPM | HEIGHT: 70 IN

## 2017-03-29 DIAGNOSIS — R10.9 ABDOMINAL PAIN, UNSPECIFIED LOCATION: ICD-10-CM

## 2017-03-29 DIAGNOSIS — B18.2 CHRONIC HEPATITIS C WITHOUT HEPATIC COMA: Primary | ICD-10-CM

## 2017-03-29 PROCEDURE — 3078F DIAST BP <80 MM HG: CPT | Mod: S$GLB,,,

## 2017-03-29 PROCEDURE — 99499 UNLISTED E&M SERVICE: CPT | Mod: S$GLB,,,

## 2017-03-29 PROCEDURE — 1159F MED LIST DOCD IN RCRD: CPT | Mod: S$GLB,,,

## 2017-03-29 PROCEDURE — 3074F SYST BP LT 130 MM HG: CPT | Mod: S$GLB,,,

## 2017-03-29 PROCEDURE — 1126F AMNT PAIN NOTED NONE PRSNT: CPT | Mod: S$GLB,,,

## 2017-03-29 PROCEDURE — 1157F ADVNC CARE PLAN IN RCRD: CPT | Mod: S$GLB,,,

## 2017-03-29 PROCEDURE — 99214 OFFICE O/P EST MOD 30 MIN: CPT | Mod: S$GLB,,,

## 2017-03-29 PROCEDURE — 99999 PR PBB SHADOW E&M-EST. PATIENT-LVL III: CPT | Mod: PBBFAC,,,

## 2017-03-29 PROCEDURE — 1160F RVW MEDS BY RX/DR IN RCRD: CPT | Mod: S$GLB,,,

## 2017-03-29 RX ORDER — DICYCLOMINE HYDROCHLORIDE 10 MG/1
10 CAPSULE ORAL
Qty: 120 CAPSULE | Refills: 2 | Status: SHIPPED | OUTPATIENT
Start: 2017-03-29 | End: 2017-04-28

## 2017-03-29 NOTE — MR AVS SNAPSHOT
[facility]  [address] [facility]  [place] [address]  Phone: [phone]  Fax: [phone]                  Sha Triplett   3/29/2017 9:40 AM   Office Visit    Description:  Male : 1950   Provider:  Srinivasa Peraza Jr., MD   Department:  [facility]           Reason for Visit     Abdominal Pain           Diagnoses this Visit        Comments    Chronic hepatitis C without hepatic coma    -  Primary     Abdominal pain, unspecified location                To Do List           Goals (5 Years of Data)     None      Follow-Up and Disposition     Return if symptoms worsen or fail to improve.       These Medications        Disp Refills Start End    dicyclomine (BENTYL) 10 MG capsule 120 capsule 2 3/29/2017 2017    Take 1 capsule (10 mg total) by mouth 4 (four) times daily before meals and nightly. - Oral    Pharmacy: [facility] [address] [place] [address] [place] AT [facility] Ph #: [phone]         [facility] On Call     [facility] On Call Nurse [facility] -  Assistance  Registered nurses in the [facility] provide clinical advisement, health education, appointment booking, and other advisory services.  Call for this free service at [phone].             Medications           Message regarding Medications     Verify the changes and/or additions to your medication regime listed below are the same as discussed with your clinician today.  If any of these changes or additions are incorrect, please notify your healthcare provider.        START taking these NEW medications        Refills    dicyclomine (BENTYL) 10 MG capsule 2    Sig: Take 1 capsule (10 mg total) by mouth 4 (four) times daily before meals and nightly.    Class: Normal    Route: Oral           Verify that the below list of medications is an accurate representation of the medications you are currently taking.  If none reported, the list may be blank. If  "incorrect, please contact your healthcare provider. Carry this list with you in case of emergency.           Current Medications     b complex vitamins tablet Take 1 tablet by mouth once daily.    cyanocobalamin, vitamin B-12, (VITAMIN B-12) 50 mcg tablet Take 50 mcg by mouth once daily.    fish oil-omega-3 fatty acids 300-1,000 mg capsule Take 2 g by mouth once daily.    milk thistle 175 mg tablet Take 175 mg by mouth once daily.    multivitamin capsule Take 1 capsule by mouth once daily.    PSYLLIUM SEED, WITH DEXTROSE, (CILIUM ORAL) Take by mouth.    dicyclomine (BENTYL) 10 MG capsule Take 1 capsule (10 mg total) by mouth 4 (four) times daily before meals and nightly.           Clinical Reference Information           Your Vitals Were     BP Pulse Temp Height Weight SpO2    120/70 (BP Location: Right arm, Patient Position: Sitting, BP Method: Manual) 79 97.8 °F (36.6 °C) 5' 10" (1.778 m) 77.7 kg (171 lb 4.8 oz) 99%    BMI                24.58 kg/m2          Blood Pressure          Most Recent Value    BP  120/70      Allergies as of 3/29/2017     No Known Allergies      Immunizations Administered on Date of Encounter - 3/29/2017     None      Orders Placed During Today's Visit      Normal Orders This Visit    Ambulatory Referral to Hepatitis C       [facility] Sign-Up     Activating your [facility] account is as easy as 1-2-3!     1) Visit [email or web address], select Sign Up Now, enter this activation code and your date of birth, then select Next.  [email or web address]  Expires: 5/6/2017 12:41 PM      2) Create a username and password to use when you visit [facility] in the future and select a security question in case you lose your password and select Next.    3) Enter your e-mail address and click Sign Up!    Additional Information  If you have questions, please e-mail [email or web address] or call [phone] to talk to our [facility] staff. Remember, [facility] is NOT to be used for urgent needs. For medical emergencies, " dial 911.         Instructions    Lots of fiber (gummy fiber, Metamucil).    Lots of fluids.       Language Assistance Services     ATTENTION: Language assistance services are available, free of charge. Please call [phone].      ATENCIÓN: Si radha denson, tiene a alejo disposición servicios gratuitos de asistencia lingüística. Llame al [phone].     CHÚ Ý: N?u b?n nói Ti?ng Vi?t, có các d?ch v? h? tr? ngôn ng? mi?n phí dành cho b?n. G?i s? [phone].         [facility] complies with applicable Federal civil rights laws and does not discriminate on the basis of race, color, national origin, age, disability, or sex.

## 2017-03-29 NOTE — PROGRESS NOTES
Chief Complaint   Patient presents with    Abdominal Pain       HPI  Sha Triplett is a 66 y.o. male who presents to the office today with right sided abdominal pain.  This pain is intermittent, can last anywhere from a few minutes to several hours.  The last time it happened the patient was on a boat, the patient had a Vicodin, took it, and it seemed to help a lot.  Patient also notes that his bowel habits tend to be more constipation than any kind of diarrhea.  There was no blood per rectum.  It is known that the patient has hepatitis C, with positive viral load.  The patient has hesitated taking anything to clear the hepatitis C.  This was probably from intravenous drug use in the remote past. The patient had been seeing Dr. Lopez at [facility], he has also had a liver biopsy,this showed some inflammation but no bridging fibrosis, and no suggestion of cirrhosis.  Patient does not drink alcohol.  Patient does not use any opioids or other illicit drugs.    Patient had a colonoscopy about 2 years ago or so and it only showed diverticular disease, nothing to st inflammatory or neoplastic bowel disease.   No urinary problems.    HPI    PAST MEDICAL HISTORY:  Past Medical History:   Diagnosis Date    Tobacco dependence        PAST SURGICAL HISTORY:  Past Surgical History:   Procedure Laterality Date    COLONOSCOPY W/ BIOPSIES  3/4/15    / Dr. Ellis at [facility].    LIVER BIOPSY  1998       SOCIAL HISTORY:  Social History     Social History    Marital status:      Spouse name: N/A    Number of children: N/A    Years of education: N/A     Occupational History    Not on file.     Social History Main Topics    Smoking status: Former Smoker     Packs/day: 0.25    Smokeless tobacco: Not on file      Comment: 1 cigarette daily    Alcohol use No    Drug use: Not on file    Sexual activity: Not on file     Other Topics Concern    Not on file     Social History Narrative     x 2.  Two bio kids. One  "adopted.   Retired [employer].  Now a non smoker.  Chris [employer] .         FAMILY HISTORY:  Family History   Problem Relation Age of Onset    Alzheimer's disease Mother     Macular degeneration Father        ALLERGIES AND MEDICATIONS: updated and reviewed.  Review of patient's allergies indicates:  No Known Allergies  Current Outpatient Prescriptions   Medication Sig Dispense Refill    b complex vitamins tablet Take 1 tablet by mouth once daily.      cyanocobalamin, vitamin B-12, (VITAMIN B-12) 50 mcg tablet Take 50 mcg by mouth once daily.      dicyclomine (BENTYL) 10 MG capsule Take 1 capsule (10 mg total) by mouth 4 (four) times daily before meals and nightly. 120 capsule 2    fish oil-omega-3 fatty acids 300-1,000 mg capsule Take 2 g by mouth once daily.      milk thistle 175 mg tablet Take 175 mg by mouth once daily.      multivitamin capsule Take 1 capsule by mouth once daily.      PSYLLIUM SEED, WITH DEXTROSE, (CILIUM ORAL) Take by mouth.       No current facility-administered medications for this visit.        ROS  Review of Systems   Constitutional: Negative for appetite change and unexpected weight change.   Gastrointestinal: Positive for abdominal pain (see history of present illness.).       Physical Exam  Vitals:    03/29/17 1005   BP: 120/70   Pulse: 79   Temp: 97.8 °F (36.6 °C)    Body mass index is 24.58 kg/(m^2).  Weight: 77.7 kg (171 lb 4.8 oz)   Height: 5' 10" (177.8 cm)     Physical Exam   Abdominal: Soft. Bowel sounds are normal. He exhibits no distension and no mass. There is no tenderness. There is no rebound and no guarding.   Genitourinary: Rectum normal and prostate normal.       Health Maintenance       Date Due Completion Date    Influenza Vaccine 8/1/2016 10/20/2014 (Done)    Override on 10/20/2014: Done ([facility])    Pneumococcal (65+) (1 of 2 - PCV13) 6/1/2017 (Originally 7/11/2014) ---    Lipid Panel 3/24/2019 3/24/2014 (Done)    Override on 3/24/2014: Done ([facility] - " scanned file)    Colonoscopy 3/3/2025 3/3/2015    TETANUS VACCINE 6/9/2026 6/9/2016 (Declined)    Override on 6/9/2016: Declined          Assessment & Plan    1. Chronic hepatitis C without hepatic coma  Actually, I think the patient should take the hepatitis C very seriously, he is going to obtain a second opinion about this from the [facility].  - Ambulatory Referral to Hepatitis C  - Comprehensive metabolic panel; Future    2. Abdominal pain, unspecified location  I believe that the patient's abdominal pain for which she came in is really a form of irritable bowel syndrome.  I suggested lots of fiber, fluids,and some Bentyl regularly to see if this really helps.  - dicyclomine (BENTYL) 10 MG capsule; Take 1 capsule (10 mg total) by mouth 4 (four) times daily before meals and nightly.  Dispense: 120 capsule; Refill: 2      Return if symptoms worsen or fail to improve.   Patient can follow with his PCP.  Follow-up with GI/hepatitis C.

## 2017-03-30 ENCOUNTER — TELEPHONE (OUTPATIENT)
Dept: HEPATOLOGY | Facility: CLINIC | Age: 67
End: 2017-03-30

## 2017-03-30 DIAGNOSIS — B18.2 CHRONIC HEPATITIS C WITHOUT HEPATIC COMA: Primary | ICD-10-CM

## 2017-03-30 NOTE — TELEPHONE ENCOUNTER
Chart reviewed. HCV RNA and genotype 1a noted. CBC and INR done 3/9. CMP was ordered by PCP but was not drawn, possibly a lab error.    Spoke to pt. States that he was previously seen by Dr. Lopez at [facility] and would like to establish care here. He is interested in learning more about treatment.   U/s, fibroscan, and clinic visit scheduled on 4/20. Reminders mailed.

## 2017-04-20 ENCOUNTER — HOSPITAL ENCOUNTER (OUTPATIENT)
Dept: RADIOLOGY | Facility: HOSPITAL | Age: 67
Discharge: HOME | End: 2017-04-20
Attending: INTERNAL MEDICINE
Payer: MEDICARE

## 2017-04-20 ENCOUNTER — PROCEDURE VISIT (OUTPATIENT)
Dept: HEPATOLOGY | Facility: CLINIC | Age: 67
End: 2017-04-20
Attending: INTERNAL MEDICINE
Payer: MEDICARE

## 2017-04-20 ENCOUNTER — OFFICE VISIT (OUTPATIENT)
Dept: HEPATOLOGY | Facility: CLINIC | Age: 67
End: 2017-04-20
Payer: MEDICARE

## 2017-04-20 VITALS
HEIGHT: 71 IN | OXYGEN SATURATION: 100 % | HEART RATE: 59 BPM | WEIGHT: 171.94 LBS | SYSTOLIC BLOOD PRESSURE: 131 MMHG | RESPIRATION RATE: 20 BRPM | TEMPERATURE: 96 F | DIASTOLIC BLOOD PRESSURE: 65 MMHG | BODY MASS INDEX: 24.07 KG/M2

## 2017-04-20 DIAGNOSIS — B18.2 CHRONIC HEPATITIS C WITHOUT HEPATIC COMA: ICD-10-CM

## 2017-04-20 DIAGNOSIS — Z11.4 ENCOUNTER FOR SCREENING FOR HIV: ICD-10-CM

## 2017-04-20 DIAGNOSIS — B18.2 CHRONIC HEPATITIS C WITHOUT HEPATIC COMA: Primary | ICD-10-CM

## 2017-04-20 PROCEDURE — 99999 PR PBB SHADOW E&M-EST. PATIENT-LVL IV: CPT | Mod: PBBFAC,,, | Performed by: PHYSICIAN ASSISTANT

## 2017-04-20 PROCEDURE — 1157F ADVNC CARE PLAN IN RCRD: CPT | Mod: 8P,S$GLB,, | Performed by: PHYSICIAN ASSISTANT

## 2017-04-20 PROCEDURE — 1159F MED LIST DOCD IN RCRD: CPT | Mod: S$GLB,,, | Performed by: PHYSICIAN ASSISTANT

## 2017-04-20 PROCEDURE — 3078F DIAST BP <80 MM HG: CPT | Mod: S$GLB,,, | Performed by: PHYSICIAN ASSISTANT

## 2017-04-20 PROCEDURE — 1160F RVW MEDS BY RX/DR IN RCRD: CPT | Mod: S$GLB,,, | Performed by: PHYSICIAN ASSISTANT

## 2017-04-20 PROCEDURE — 91200 LIVER ELASTOGRAPHY: CPT | Mod: S$GLB,,, | Performed by: PHYSICIAN ASSISTANT

## 2017-04-20 PROCEDURE — 99214 OFFICE O/P EST MOD 30 MIN: CPT | Mod: S$GLB,,, | Performed by: PHYSICIAN ASSISTANT

## 2017-04-20 PROCEDURE — 99499 UNLISTED E&M SERVICE: CPT | Mod: S$GLB,,, | Performed by: PHYSICIAN ASSISTANT

## 2017-04-20 PROCEDURE — 1126F AMNT PAIN NOTED NONE PRSNT: CPT | Mod: S$GLB,,, | Performed by: PHYSICIAN ASSISTANT

## 2017-04-20 PROCEDURE — 3075F SYST BP GE 130 - 139MM HG: CPT | Mod: S$GLB,,, | Performed by: PHYSICIAN ASSISTANT

## 2017-04-20 NOTE — LETTER
April 20, 2017      Srinivasa Peraza Jr., MD  [address]  [place] [address]           [facility] - Hepatology  [address]  [place] [address]  Phone: [phone]  Fax: [phone]          Patient: Sha Triplett   MR Number: [id number]   YOB: 1950   Date of Visit: 4/20/2017       Dear Dr. Srinivasa Peraza Jr.:    Thank you for referring Sha Triplett to me for evaluation. Attached you will find relevant portions of my assessment and plan of care.    If you have questions, please do not hesitate to call me. I look forward to following Sha Triplett along with you.    Sincerely,    Kristel Marc PA-C    Enclosure  CC:  No Recipients    If you would like to receive this communication electronically, please contact [email or web address] or [phone] to request more information on [facility] access.    For providers and/or their staff who would like to refer a patient to [facility], please contact us through our one-stop-shop provider referral line, [facility], at [phone].    If you feel you have received this communication in error or would no longer like to receive these types of communications, please e-mail [email or web address]

## 2017-04-20 NOTE — PROCEDURES
Procedures Fibroscan Procedure     Name: Sha Triplett  Date of Procedure : 2017   :: Kristel Marc PA-C  Diagnosis: HCV  Probe: M    Fibroscan readin.9 KPa    IQR/med:16 %    Fibrosis:F 0-1

## 2017-04-20 NOTE — MR AVS SNAPSHOT
[facility] - Hepatology  [address]  [facility] [address]  Phone: [phone]  Fax: [phone]                  Sha Triplett   2017 11:20 AM   Office Visit    Description:  Male : 1950   Provider:  Kristel Marc PA-C   Department:  [facility] - Hepatology           Reason for Visit     Hepatitis C           Diagnoses this Visit        Comments    Chronic hepatitis C without hepatic coma    -  Primary     Encounter for screening for HIV                To Do List           Goals (5 Years of Data)     None      [facility]     [facility] On Call Nurse Care Line -  Assistance  Unless otherwise directed by your provider, please contact [facility], our nurse care line that is available for  assistance.     Registered nurses in the [facility] On Call Center provide: appointment scheduling, clinical advisement, health education, and other advisory services.  Call: [phone] (toll free)               Medications           Message regarding Medications     Verify the changes and/or additions to your medication regime listed below are the same as discussed with your clinician today.  If any of these changes or additions are incorrect, please notify your healthcare provider.             Verify that the below list of medications is an accurate representation of the medications you are currently taking.  If none reported, the list may be blank. If incorrect, please contact your healthcare provider. Carry this list with you in case of emergency.           Current Medications     b complex vitamins tablet Take 1 tablet by mouth once daily.    cyanocobalamin, vitamin B-12, (VITAMIN B-12) 50 mcg tablet Take 50 mcg by mouth once daily.    dicyclomine (BENTYL) 10 MG capsule Take 1 capsule (10 mg total) by mouth 4 (four) times daily before meals and nightly.    fish oil-omega-3 fatty acids 300-1,000 mg capsule Take 2 g by mouth once daily.    milk thistle 175 mg tablet Take 175 mg by  "mouth once daily.    multivitamin capsule Take 1 capsule by mouth once daily.    PSYLLIUM SEED, WITH DEXTROSE, (CILIUM ORAL) Take by mouth.           Clinical Reference Information           Your Vitals Were     BP Pulse Temp Resp Height Weight    131/65 (BP Location: Left arm, Patient Position: Sitting, BP Method: Automatic) 59 96.3 °F (35.7 °C) (Oral) 20 5' 11" (1.803 m) 78 kg (171 lb 15.3 oz)    SpO2 BMI             100% 23.98 kg/m2         Blood Pressure          Most Recent Value    BP  131/65      Allergies as of 4/20/2017     No Known Allergies      Immunizations Administered on Date of Encounter - 4/20/2017     None      Orders Placed During Today's Visit     Future Labs/Procedures Expected by Expires    CBC auto differential  As directed 6/19/2018    Comprehensive metabolic panel  As directed 6/19/2018    Hepatitis A antibody, IgG  As directed 6/19/2018    Hepatitis B core antibody, total  As directed 4/20/2018    Hepatitis B surface antibody  As directed 4/20/2018    Hepatitis B surface antigen  As directed 4/20/2018    Hepatitis C RNA, quantitative, PCR  As directed 4/20/2018    HIV-1 and HIV-2 antibodies  As directed 4/20/2018    Protime-INR  As directed 4/20/2018      [facility] Sign-Up     Activating your [facility] account is as easy as 1-2-3!     1) Visit [email or web address], select Sign Up Now, enter this activation code and your date of birth, then select Next.  [email or web address]  Expires: 5/6/2017 12:41 PM      2) Create a username and password to use when you visit [facility] in the future and select a security question in case you lose your password and select Next.    3) Enter your e-mail address and click Sign Up!    Additional Information  If you have questions, please e-mail [email or web address] or call [phone] to talk to our [facility] staff. Remember, [facility] is NOT to be used for urgent needs. For medical emergencies, dial 911.         Instructions    1. Recommend looking into vaccines for " Hep A and B  2.  No raw seafood  3.  Limit acetaminophen to 2000mg daily          Language Assistance Services     ATTENTION: Language assistance services are available, free of charge. Please call [phone].      ATENCIÓN: Si radha denson, tiene a alejo disposición servicios gratuitos de asistencia lingüística. Llame al [phone].     CHÚ Ý: N?u b?n nói Ti?ng Vi?t, có các d?ch v? h? tr? ngôn ng? mi?n phí dành cho b?n. G?i s? [phone].         [facility] complies with applicable Federal civil rights laws and does not discriminate on the basis of race, color, national origin, age, disability, or sex.

## 2017-04-20 NOTE — PATIENT INSTRUCTIONS
1. Recommend looking into vaccines for Hep A and B  2.  No raw seafood  3.  Limit acetaminophen to 2000mg daily

## 2017-04-20 NOTE — PROGRESS NOTES
HEPATOLOGY CLINIC VISIT NOTE    REFERRING PROVIDER:  Srinivasa Peraza MD    CHIEF COMPLAINT: Hepatitis C    HISTORY: Patient is a 66 y.o. WM who is new to the hepatology clinic, referred for Genotype 1a Chronic Hepatitis C.  This is not a new diagnosis.  he is also being seen by  Dr. Lopez at [facility] for recurrence of abdominal pain and HCV.  He would like to have another opinion regarding his HCV.   He was approved for Harvoni regimen early 2015, but declined to start due to lack of longterm studies.    He had a fibroscan done today which suggests minimal scarring w/ a score of 4.9kPa, F0-1.  He had a liver biopsy done which noted minimal fibrosis 3-2015.   Abdominal Ultrasound was scheduled for today but cancelled b/c had one on 4- by Dr. Lopez.  Reviewed outside ultrasound which is essenially normal.   Does not recall vaccines for hepatitis A and B.      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 His risk for eun HCV date back to 1972.       Pt has no complaints today and feels well aside for recurring abdominal pain, which he states he had same few years back, RLQ and persists for a few hours, no provocative or palliative factors, no N/V, but does not some constipation, using pruns.  No hematuria or flank pain. No h/o renal stones.  he denies fevers, weight change,  lymphadenopathy,  Rashes, joint pains, dark urine, abdominal fluid accumulation, yellowing/jaundice of skin or eyes, vomiting of blood, black stool, confusion or  tremors.      [place]        Past Medical History:   Diagnosis Date    Tobacco dependence      FHX:   No fhx of liver cancer or liver disease.   Parents lived until their early 90's     ALLERGIES AND MEDICATIONS: reviewed in  epic    ROS:   General: denies fever, chills, weight change, fatigue   Skin: denies rash, itching, sores  HEENT: denies headaches, vision problems, blurring vision, hearing problems, rhinorrhea, stuffiness, sore throat, swollen neck  Respiratory: denies SOB, wheeze, cough, sputum, hemoptysis  CV: denies chest pain or palpatations  GI: denies appetite change, nausea, vomiting, diarrhea,  hematechezia, jaundice,   hematemesis,  denies icteric illness, abdominal swelling, confusion.   : denies dark urine, dysuria  Musculoskeletal: denies muscle weakness, joint pains.   Neuro/Psych:  Denies H/o depression/anxiety or psychiatric illness or hospitalization    PE:  WDWN, NAD  Alert, oriented and pleasant.   Vitals:    17 0951   BP: 131/65   Pulse: (!) 59   Resp: 20   Temp: 96.3 °F (35.7 °C)   HEENT: ncat, eomi, no scleral icterus; normal rom of neck  Heart: regular rate and rhythm  Lungs: clear bilaterally, chest symmetric with respirations. No wheezes rhonchi or rales.   Abdomen: + bs, ntnd. No organomegaly  Neuro/psych: no asterixis, oriented x3, mood and affect appropriate.   Skin: warm and dry, no c/c/e. No march erythema.  No spider telangectasia     RECENT LABS:  No results found for: HEPAIGG    No results found for: HEPBSAG  No results found for: HEPBCAB  No results found for: HEPBSAB    There is no immunization history on file for this patient.  Results for SHA LEZAMA (MRN [id number]) as of 2017 10:38   Ref. Range 2015 09:58 2015 10:05   HCV Log Latest Ref Range: <1.08   6.15 (H)   HCV RNA Quant PCR Latest Ref Range: <12 IU/mL  [id number] (H)   HCV, Qualitative Latest Ref Range: NEGATIVE   POSITIVE (A)   Hepatitis C Genotype Unknown 1a      PROCEDURES:   Procedures Fibroscan Procedure      Name: Sha Lezama  Date of Procedure : 2017   :: Kristel Marc PA-C  Diagnosis: HCV  Probe: EFRAÍN     Fibroscan readin.9 KPa     IQR/med:16 %     Fibrosis:F 0-1      FINAL PATHOLOGIC  DIAGNOSIS  LIVER BIOPSIES:: CHRONIC HEPATITIS C CHARACTERIZED BY MILD AND FOCAL MODERATE CHRONIC  TRIADITIS WITH FOCAL MINIMAL INTERFACE HEPATITIS. FOCI OF MILD LOBULAR INFLAMMATION.  FOCAL MINIMAL FIBROSIS. NO EVIDENCE OF PORTAL TO PORTAL OR PORTAL TO CENTRAL FIBROSIS.  NO EVIDENCE OF CIRRHOSIS. NO EVIDENCE OF STEATOSIS.  Diagnosed by: Andre Humphrey M.D.  (Electronically Signed: 2015-03-13 10:07:17)    ASSESSMENT:  65yo WM w/   1. CHRONIC HEPATITIS C, GENOTYPE 1a, tx naive   essentially normal transaminases and normal synthetic liver function  Fibroscan = F0-1, suggesting no progression since his past liver biopsie in 2015    Concurrent alcohol use:  None   Negative HBsAb, no Hep A IgG available to review.             EDUCATION DISCUSSION    The natural history of Hepatitis C, including potential progression to cirrhosis was reviewed.   We discussed the increased progression of liver disease secondary to alcohol use; patient was advised to avoid alcohol completely.   Transmission of Hepatitis C was reviewed, including possible sexual transmission. Sexual contacts should be screened.   Patient should avoid sharing personal products such as razors, toothbrushes, etc.   We reviewed that vaccination against Hepatitis A and Hepatitis B is recommended if patient does not already have immunity.  Recommend avoiding raw seafood.  Limit acetaminophen to 2000mg daily.    Given that he has had hepatitis C for over 40 years and his liver disease has shown no evidence of progression w/ Fibroscan today suggesting minimal scarring , similar to past liver biopsy.  Seems to be no need for urgent treatment.  He understands that w/ deferring treatment, liver disease could progress and that perhaps in the future he may elect to proceed w/ treamtent.  He is willing to take the risk, at least until he has more information regarding longterm studies of the DAA's     PLAN:  RTC yearly/ PRN     Thank you for your kind consult, will keep you  updated on our progress.

## 2017-08-11 ENCOUNTER — OFFICE VISIT (OUTPATIENT)
Dept: FAMILY MEDICINE | Facility: CLINIC | Age: 67
End: 2017-08-11
Payer: MEDICARE

## 2017-08-11 VITALS
DIASTOLIC BLOOD PRESSURE: 80 MMHG | BODY MASS INDEX: 24.19 KG/M2 | OXYGEN SATURATION: 99 % | TEMPERATURE: 98 F | SYSTOLIC BLOOD PRESSURE: 140 MMHG | WEIGHT: 172.81 LBS | HEIGHT: 71 IN | HEART RATE: 66 BPM

## 2017-08-11 DIAGNOSIS — I10 ESSENTIAL HYPERTENSION: ICD-10-CM

## 2017-08-11 DIAGNOSIS — Z00.00 ENCOUNTER FOR PREVENTIVE HEALTH EXAMINATION: Primary | ICD-10-CM

## 2017-08-11 DIAGNOSIS — B18.2 CHRONIC HEPATITIS C WITHOUT HEPATIC COMA: ICD-10-CM

## 2017-08-11 PROCEDURE — G0439 PPPS, SUBSEQ VISIT: HCPCS | Mod: S$GLB,,, | Performed by: NURSE PRACTITIONER

## 2017-08-11 PROCEDURE — 99499 UNLISTED E&M SERVICE: CPT | Mod: S$GLB,,, | Performed by: NURSE PRACTITIONER

## 2017-08-11 PROCEDURE — 99999 PR PBB SHADOW E&M-EST. PATIENT-LVL IV: CPT | Mod: PBBFAC,,, | Performed by: NURSE PRACTITIONER

## 2017-08-11 NOTE — PATIENT INSTRUCTIONS
Counseling and Referral of Other Preventative  (Italic type indicates deductible and co-insurance are waived)    Patient Name: Sha Triplett  Today's Date: 8/11/2017      SERVICE LIMITATIONS RECOMMENDATION    Vaccines    · Pneumococcal (once after 65)    · Influenza (annually)    · Hepatitis B (if medium/high risk)    · Prevnar 13      Hepatitis B medium/high risk factors:       - End-stage renal disease       - Hemophiliacs who received Factor VII or         IX concentrates       - Clients of institutions for the mentally             retarded       - Persons who live in the same house as          a HepB carrier       - Homosexual men       - Illicit injectable drug abusers     Pneumococcal: discussed with patient     Influenza: discussed with patient     Hepatitis B: he's followed by [facility]atology dept      Prevnar 13: discussed with patient      Prostate cancer screening (annually to age 75)     Prostate specific antigen (PSA) Shared decision making with Provider. Sometimes a co-pay may be required if the patient decides to have this test. The USPSTF no longer recommends prostate cancer screening routinely in medicine: per patient and provider     Colorectal cancer screening (to age 75)    · Fecal occult blood test (annual)  · Flexible sigmoidoscopy (5y)  · Screening colonoscopy (10y)  · Barium enema   Last done 3/2015, recommend to repeat every 10  years    Diabetes self-management training (no USPSTF recommendations)  Requires referral by treating physician for patient with diabetes or renal disease. 10 hours of initial DSMT sessions of no less than 30 minutes each in a continuous 12-month period. 2 hours of follow-up DSMT in subsequent years.  N/A    Glaucoma screening (no USPSTF recommendation)  Diabetes mellitus, family history   , age 50 or over    American, age 65 or over  Done this year, repeat every year    Medical nutrition therapy for diabetes or renal disease (no  recommended schedule)  Requires referral by treating physician for patient with diabetes or renal disease or kidney transplant within the past 3 years.  Can be provided in same year as diabetes self-management training (DSMT), and CMS recommends medical nutrition therapy take place after DSMT. Up to 3 hours for initial year and 2 hours in subsequent years.  N/A    Cardiovascular screening blood tests (every 5 years)  · Fasting lipid panel  Order as a panel if possible  Last done 3/2014, recommend to repeat every 5  years    Diabetes screening tests (at least every 3 years, Medicare covers annually or at 6-month intervals for prediabetic patients)  · Fasting blood sugar (FBS) or glucose tolerance test (GTT)  Patient must be diagnosed with one of the following:       - Hypertension       - Dyslipidemia       - Obesity (BMI 30kg/m2)       - Previous elevated impaired FBS or GTT       ... or any two of the following:       - Overweight (BMI 25 but <30)       - Family history of diabetes       - Age 65 or older       - History of gestational diabetes or birth of baby weighing more than 9 pounds Ordered     Abdominal aortic aneurysm screening (once)  · Sonogram   Limited to patients who meet one of the following criteria:       - Men who are 65-75 years old and have smoked more than 100 cigarette in their lifetime       - Anyone with a family history of abdominal aortic aneurysm       - Anyone recommended for screening by the USPSTF  ordered    HIV screening (annually for increased risk patients)  · HIV-1 and HIV-2 by EIA, or ELISEO, rapid antibody test or oral mucosa transudate  Patients must be at increased risk for HIV infection per USPSTF guidelines or pregnant. Tests covered annually for patient at increased risk or as requested by the patient. Pregnant patients may receive up to 3 tests during pregnancy. Ordered April 2017    Smoking cessation counseling (up to 8 sessions per year)  Patients must be asymptomatic of  tobacco-related conditions to receive as a preventative service.  Non-smoker    Subsequent annual wellness visit  At least 12 months since last AWV  Return in one year     The following information is provided to all patients.  This information is to help you find resources for any of the problems found today that may be affecting your health:                Living healthy guide: www.[facility].[place].[facility]      Understanding Diabetes: www.diabetes.org      Eating healthy: www.cdc.gov/healthyweight      CDC home safety checklist: www.cdc.gov/steadi/patient.html      Agency on Aging: www.goea.[place].[facility]      Alcoholics anonymous (AA): www.aa.org      Physical Activity: www.dipti.nih.gov/kb4jknm      Tobacco use: www.quitwithusla.org

## 2017-08-12 NOTE — PROGRESS NOTES
"Sha Triplett presented for a  Medicare AWV and comprehensive Health Risk Assessment today. The following components were reviewed and updated:    · Medical history  · Family History  · Social history  · Allergies and Current Medications  · Health Risk Assessment  · Health Maintenance  · Care Team     ** See Completed Assessments for Annual Wellness Visit within the encounter summary.**       The following assessments were completed:  · Living Situation  · CAGE  · Depression Screening  · Timed Get Up and Go  · Whisper Test  · Cognitive Function Screening  · Nutrition Screening  · ADL Screening  · PAQ Screening    Vitals:    08/11/17 1001   BP: (!) 140/80   Pulse: 66   Temp: 98 °F (36.7 °C)   TempSrc: Oral   SpO2: 99%   Weight: 78.4 kg (172 lb 13.5 oz)   Height: 5' 11" (1.803 m)     Body mass index is 24.11 kg/m².  Physical Exam   Constitutional: He is oriented to person, place, and time.   Cardiovascular: Normal rate, regular rhythm and normal heart sounds.    Pulmonary/Chest: Effort normal and breath sounds normal.   Musculoskeletal: Normal range of motion.   Neurological: He is alert and oriented to person, place, and time.   Skin: Skin is warm.   Psychiatric: He has a normal mood and affect. His behavior is normal. Thought content normal.   Vitals reviewed.        Diagnoses and health risks identified today and associated recommendations/orders:    1. Encounter for preventive health examination  Education provided about preventive health examinations and procedures; addressed and discussed patient's health concerns. Additionally, reviewed medical record for risk factors and documented the results during this encounter.    2. Chronic hepatitis C without hepatic coma  Stable, [facility], denies worsening symptoms; continue as advised.     3. Essential hypertension  Stable, patient asymptomatic.  Advised continued compliance with directives, diet, and lifestyle modifications as " recommended.    Reviewed health maintenance with patient, educated about recommended examinations, procedures (labs & images), and immunizations.       Provided Sha with a 5-10 year written screening schedule and personal prevention plan. Recommendations were developed using the USPSTF age appropriate recommendations. Education, counseling, and referrals were provided as needed. After Visit Summary printed and given to patient which includes a list of additional screenings\tests needed.    Return in about 1 year (around 8/11/2018) for Health Risk Assessment .    Josemanuel Mccabe Jr, NP

## 2018-01-30 ENCOUNTER — OFFICE VISIT (OUTPATIENT)
Dept: OPTOMETRY | Facility: CLINIC | Age: 68
End: 2018-01-30
Payer: MEDICARE

## 2018-01-30 DIAGNOSIS — Z01.00 EYE EXAM, ROUTINE: Primary | ICD-10-CM

## 2018-01-30 DIAGNOSIS — H52.203 MYOPIA WITH ASTIGMATISM AND PRESBYOPIA, BILATERAL: ICD-10-CM

## 2018-01-30 DIAGNOSIS — H52.13 MYOPIA WITH ASTIGMATISM AND PRESBYOPIA, BILATERAL: ICD-10-CM

## 2018-01-30 DIAGNOSIS — H52.4 MYOPIA WITH ASTIGMATISM AND PRESBYOPIA, BILATERAL: ICD-10-CM

## 2018-01-30 PROCEDURE — 92015 DETERMINE REFRACTIVE STATE: CPT | Mod: S$GLB,,, | Performed by: OPTOMETRIST

## 2018-01-30 PROCEDURE — 99999 PR PBB SHADOW E&M-EST. PATIENT-LVL II: CPT | Mod: PBBFAC,,, | Performed by: OPTOMETRIST

## 2018-01-30 PROCEDURE — 92014 COMPRE OPH EXAM EST PT 1/>: CPT | Mod: S$GLB,,, | Performed by: OPTOMETRIST

## 2018-01-30 NOTE — PROGRESS NOTES
HPI     Lobito Sha Triplett for annual eye exam.    Patient sts no complaints pr ocular issues    Would patient like a refraction today? yes    (-)drops  (-)flashes  (-)floaters  (-)diplopia    Diabetic -  No results found for: HGBA1C    OCULAR HISTORY  Last Eye Exam 2017  (-)eye surgery   (-)diagnosed or treated for any eye conditions or diseases -    FAMILY HISTORY  (-)Glaucoma -              Last edited by SAMY Kern on 1/30/2018 10:18 AM. (History)            Assessment /Plan     For exam results, see Encounter Report.    Eye exam, routine  -Eyemed vision exam  -Educated patient on presence of cataracts at today's exam, monitor at annual dilated fundus exam. 5+ years surgical estimate.    Myopia with astigmatism and presbyopia, bilateral  Eyeglass Final Rx     Eyeglass Final Rx       Sphere Cylinder Axis Dist VA Add    Right -2.25 +1.75 180 20/25-- +2.50    Left -2.00 +1.00 180 20/25+ +2.50    Type:  Bifocal    Expiration Date:  1/31/2019                  RTC 1 yr

## 2018-03-12 ENCOUNTER — OFFICE VISIT (OUTPATIENT)
Dept: FAMILY MEDICINE | Facility: CLINIC | Age: 68
End: 2018-03-12
Payer: MEDICARE

## 2018-03-12 VITALS
TEMPERATURE: 98 F | SYSTOLIC BLOOD PRESSURE: 118 MMHG | OXYGEN SATURATION: 99 % | DIASTOLIC BLOOD PRESSURE: 80 MMHG | BODY MASS INDEX: 25 KG/M2 | HEIGHT: 71 IN | WEIGHT: 178.56 LBS | HEART RATE: 76 BPM

## 2018-03-12 DIAGNOSIS — M25.511 ACUTE PAIN OF RIGHT SHOULDER: Primary | ICD-10-CM

## 2018-03-12 DIAGNOSIS — I10 ESSENTIAL HYPERTENSION: ICD-10-CM

## 2018-03-12 DIAGNOSIS — B18.2 CHRONIC HEPATITIS C WITHOUT HEPATIC COMA: ICD-10-CM

## 2018-03-12 PROCEDURE — 3079F DIAST BP 80-89 MM HG: CPT | Mod: CPTII,S$GLB,, | Performed by: FAMILY MEDICINE

## 2018-03-12 PROCEDURE — 99214 OFFICE O/P EST MOD 30 MIN: CPT | Mod: S$GLB,,, | Performed by: FAMILY MEDICINE

## 2018-03-12 PROCEDURE — 99999 PR PBB SHADOW E&M-EST. PATIENT-LVL III: CPT | Mod: PBBFAC,,, | Performed by: FAMILY MEDICINE

## 2018-03-12 PROCEDURE — 99499 UNLISTED E&M SERVICE: CPT | Mod: S$GLB,,, | Performed by: FAMILY MEDICINE

## 2018-03-12 PROCEDURE — 3074F SYST BP LT 130 MM HG: CPT | Mod: CPTII,S$GLB,, | Performed by: FAMILY MEDICINE

## 2018-03-12 RX ORDER — TIZANIDINE 2 MG/1
2 TABLET ORAL EVERY 6 HOURS PRN
Qty: 30 TABLET | Refills: 1 | Status: SHIPPED | OUTPATIENT
Start: 2018-03-12 | End: 2018-05-29

## 2018-03-12 NOTE — PROGRESS NOTES
Office Visit    Patient Name: Sha Triplett    : 1950  MRN: [id number]      Assessment/Plan:  Sha Triplett is a 67 y.o. male who presents today for :    Acute pain of right shoulder  -     tiZANidine (ZANAFLEX) 2 MG tablet; Take 1 tablet (2 mg total) by mouth every 6 (six) hours as needed.  Dispense: 30 tablet; Refill: 1    -activity modification d/w patient: avoid reaching overhead activities, avoid heavy lifting and provocative movements, alternate ice/heat/deep massage  -shoulder ROM stretching and strengthening exercises demonstrated in clinic and handouts given to patient  -RTC in 4-6 weeks if not better, or sooner if pain worsens.  -patient advised to use OTC Tylenol (325mg tablets) once every 4-6 hours as needed for pain - avoid regular use of NSAIDs due to potential GI/BP side effects (may take a short-course and then stop).     Chronic hepatitis C without hepatic coma  -f/u with HEP, lab summary from prior HEP visit in chart reviewed    Essential hypertension  -diet controlled  -continue advised DASH diet, regular exercise - as well as portion control.       Follow-up for worsening Sx. Urgent care/ED precautions provided.     This note was created by combination of typed  and Dragon dictation.  Transcription errors may be present.  If there are any questions, please contact me.        ----------------------------------------------------------------------------------------------------------------------      HPI:  Sha is a 67 y.o. male who presents today for:    Shoulder Pain        This patient has multiple medical diagnoses as noted below.    This patient is new to me   Patient is here today for R Shoulder Pain  that started 10 days ago after he was working out with weights, no radiation. Achy in sensation, tolerable for the most part. Take Tylenol and Advil as needed.    He has a history of HTN that is diet controlled, he is physically active, goes to gym several times a week. Bp at  home in the 130s/80s range.  He has a known history of Hep C - is being followed by HEP. He is overdue for f/u visit as well as labs. Denies any GI bleeding/jaundice. His prior labs in chart are reivewed:  Prior labs from chart that patient had brought with him 4-4-2017:   Cbc--platelets 221K, normal H&H  INR - 1.0   Normal liver enzymes, normal liver function   Albumin 4.0         Additional ROS  No vision changes  No F/C/wt changes/fatigue  No dysphagia/sore throat/rhinorrhea  No CP/SOB/palpitations/swelling  No cough/wheezing/SOB  No nausea/vomiting/abd pain/no diarrhea, no constipation, blood in stool  No rashes  No weakness/HA/tingling/numbness        Patient Care Team:  Kirsten Portillo MD as PCP - General (Family Medicine)        Patient Active Problem List   Diagnosis    Hypertension    Chronic hepatitis C without hepatic coma    Other symptoms involving digestive system(787.99)       Current Medications  Medications reviewed and updated.       Current Outpatient Prescriptions:     b complex vitamins tablet, Take 1 tablet by mouth once daily., Disp: , Rfl:     cyanocobalamin, vitamin B-12, (VITAMIN B-12) 50 mcg tablet, Take 50 mcg by mouth once daily., Disp: , Rfl:     fish oil-omega-3 fatty acids 300-1,000 mg capsule, Take 2 g by mouth once daily., Disp: , Rfl:     milk thistle 175 mg tablet, Take 175 mg by mouth once daily., Disp: , Rfl:     multivitamin capsule, Take 1 capsule by mouth once daily., Disp: , Rfl:     PSYLLIUM SEED, WITH DEXTROSE, (CILIUM ORAL), Take by mouth., Disp: , Rfl:     tiZANidine (ZANAFLEX) 2 MG tablet, Take 1 tablet (2 mg total) by mouth every 6 (six) hours as needed., Disp: 30 tablet, Rfl: 1    Past Surgical History:   Procedure Laterality Date    COLONOSCOPY W/ BIOPSIES  3/4/15    / Dr. Ellis at [facility].    LIVER BIOPSY  1998       Family History   Problem Relation Age of Onset    Alzheimer's disease Mother     Macular degeneration Father        Social History     Social  "History    Marital status:      Spouse name: N/A    Number of children: N/A    Years of education: N/A     Occupational History    Not on file.     Social History Main Topics    Smoking status: Former Smoker     Packs/day: 0.00    Smokeless tobacco: Never Used    Alcohol use No    Drug use: Unknown    Sexual activity: Not on file     Other Topics Concern    Not on file     Social History Narrative     x 2.  Two bio kids. One adopted.   Retired [employer].  Now a non smoker.  Chris Nam .             Allergies   Review of patient's allergies indicates:  No Known Allergies          Review of Systems  See HPI      Physical Exam  /80   Pulse 76   Temp 97.5 °F (36.4 °C) (Oral)   Ht 5' 11" (1.803 m)   Wt 81 kg (178 lb 9.2 oz)   SpO2 99%   BMI 24.91 kg/m²     GEN: NAD, well developed, pleasant, well nourished  HEENT: NCAT, PERRLA, EOMI, sclera clear, anicteric, O/P clear, MMM with no lesions  NECK: normal, supple with midline trachea, no LAD, no thyromegaly  LUNGS: CTAB, no w/r/r, no increased work of breathing   HEART: RRR, normal S1 and S2, no m/r/g, no edema  ABD: s/nt/nd, NABS  SKIN: normal turgor, no rashes  PSYCH: AOx3, appropriate mood and affect  MSK: warm/well perfused, normal ROM in all 4 extremities, no c/c/e.  R shoulder with FROM.  Normal  Flexion/extention/abduction/adduction/empty can/push off/internal and external rotation.   No swelling/erythema/TTP.           "

## 2018-03-19 ENCOUNTER — OFFICE VISIT (OUTPATIENT)
Dept: FAMILY MEDICINE | Facility: CLINIC | Age: 68
End: 2018-03-19
Payer: MEDICARE

## 2018-03-19 VITALS
BODY MASS INDEX: 24.89 KG/M2 | HEART RATE: 66 BPM | SYSTOLIC BLOOD PRESSURE: 110 MMHG | DIASTOLIC BLOOD PRESSURE: 60 MMHG | HEIGHT: 71 IN | TEMPERATURE: 98 F | OXYGEN SATURATION: 99 % | WEIGHT: 177.81 LBS

## 2018-03-19 DIAGNOSIS — M25.511 ACUTE PAIN OF RIGHT SHOULDER: ICD-10-CM

## 2018-03-19 DIAGNOSIS — G47.09 OTHER INSOMNIA: Primary | ICD-10-CM

## 2018-03-19 PROCEDURE — 3074F SYST BP LT 130 MM HG: CPT | Mod: CPTII,S$GLB,, | Performed by: FAMILY MEDICINE

## 2018-03-19 PROCEDURE — 3078F DIAST BP <80 MM HG: CPT | Mod: CPTII,S$GLB,, | Performed by: FAMILY MEDICINE

## 2018-03-19 PROCEDURE — 99999 PR PBB SHADOW E&M-EST. PATIENT-LVL III: CPT | Mod: PBBFAC,,, | Performed by: FAMILY MEDICINE

## 2018-03-19 PROCEDURE — 99214 OFFICE O/P EST MOD 30 MIN: CPT | Mod: S$GLB,,, | Performed by: FAMILY MEDICINE

## 2018-03-19 NOTE — PROGRESS NOTES
Office Visit    Patient Name: Sha Triplett    : 1950  MRN: [id number]      Assessment/Plan:  Sha Triplett is a 67 y.o. male who presents today for :    Other insomnia  -I had a detailed discussion with patient regarding the risks and benefits of sleeping medications, and the importance of treating the underlying cause of insomnia instead of just treating insomnia with sleep medications  -will start patient on trial of Melatonin  -advised patient to modify pre-bedtime routine:   -avoid watching TV at least 3-4 hours before bedtime, as well a no cell phone use in bed. If patient wakes up in the middle of the night, to get up and do tasks that do not involve mentally intense activities until patient gets sleepy again.    Acute pain of right shoulder  -improving, continue stretching and heat packs as needed       At least 25 minutes were spent today with the patient in the office, which more than half the time was spent in counseling regarding sleep hygiene. Pt voices understanding of what was discussed and has no other questions at this time.      Follow-up for any evaluation as needed.     This note was created by combination of typed  and Dragon dictation.  Transcription errors may be present.  If there are any questions, please contact me.        ----------------------------------------------------------------------------------------------------------------------      HPI:  Sha is a 67 y.o. male who presents today for:    Shoulder Pain and Insomnia        This patient has multiple medical diagnoses as noted below.    his patient is known to me and to this clinic. The patient's last visit with me was on 3/12/2018.    Patient is here today for R Shoulder Pain and Insomnia    He states that he has had difficulty with sleeping for the past dfew weeks - able to fall asleep but not able to stay asleep. Sleeps about less than 6 hours a night. Pt does watch TV prior to bedtime  Takes 1 cup of coffee  only in the morning - no alcohol/drugs/new medications  Meds tried at home: Advil PM but doesn't work  Denies panic attacks/SI/HI/mood changes  No stressors from life, work, finance, family issues.    His right shoulder pain is getting better overall with the stretching exercises. He has not needed to take any pain medications for the pain.    Additional ROS    No F/C/wt changes/fatigue  No dysphagia/sore throat/rhinorrhea  No CP/SOB/palpitations/swelling  No cough/wheezing/SOB  No nausea/vomiting/abd pain  No rashes  No weakness/HA/tingling/numbness  No anxiety/depression      Patient Care Team:  Jude Kee MD as PCP - General (Family Medicine)        Patient Active Problem List   Diagnosis    Hypertension    Chronic hepatitis C without hepatic coma    Other symptoms involving digestive system(687.28)       Current Medications  Medications reviewed and updated.       Current Outpatient Prescriptions:     b complex vitamins tablet, Take 1 tablet by mouth once daily., Disp: , Rfl:     cyanocobalamin, vitamin B-12, (VITAMIN B-12) 50 mcg tablet, Take 50 mcg by mouth once daily., Disp: , Rfl:     fish oil-omega-3 fatty acids 300-1,000 mg capsule, Take 2 g by mouth once daily., Disp: , Rfl:     milk thistle 175 mg tablet, Take 175 mg by mouth once daily., Disp: , Rfl:     multivitamin capsule, Take 1 capsule by mouth once daily., Disp: , Rfl:     PSYLLIUM SEED, WITH DEXTROSE, (CILIUM ORAL), Take by mouth., Disp: , Rfl:     tiZANidine (ZANAFLEX) 2 MG tablet, Take 1 tablet (2 mg total) by mouth every 6 (six) hours as needed., Disp: 30 tablet, Rfl: 1    Past Surgical History:   Procedure Laterality Date    COLONOSCOPY W/ BIOPSIES  3/4/15    / Dr. Ellis at [facility].    LIVER BIOPSY  1998       Family History   Problem Relation Age of Onset    Alzheimer's disease Mother     Macular degeneration Father        Social History     Social History    Marital status:      Spouse name: N/A    Number of  "children: N/A    Years of education: N/A     Occupational History    Not on file.     Social History Main Topics    Smoking status: Former Smoker     Packs/day: 0.00    Smokeless tobacco: Never Used    Alcohol use No    Drug use: Unknown    Sexual activity: Not on file     Other Topics Concern    Not on file     Social History Narrative     x 2.  Two bio kids. One adopted.   Retired [employer].  Now a non smoker.  [employer] .             Allergies   Review of patient's allergies indicates:  No Known Allergies          Review of Systems  See HPI      Physical Exam  /60   Pulse 66   Temp 98 °F (36.7 °C) (Oral)   Ht 5' 11" (1.803 m)   Wt 80.7 kg (177 lb 12.8 oz)   SpO2 99%   BMI 24.80 kg/m²     GEN: NAD, well developed, pleasant, well nourished  HEENT: NCAT, PERRLA, EOMI, sclera clear, anicteric, O/P clear, MMM with no lesions  NECK: normal, supple with midline trachea, no LAD, no thyromegaly  LUNGS: CTAB, no w/r/r, no increased work of breathing   HEART: RRR, normal S1 and S2, no m/r/g, no edema  ABD: s/nt/nd, NABS  SKIN: normal turgor, no rashes  PSYCH: AOx3, appropriate mood and affect  MSK: warm/well perfused, normal ROM in all 4 extremities, no c/c/e. FROM in R shoulder with good mobility on active and passive ROM testing.      Labs  No results found for: LABA1C, HGBA1C  No results found for: NA, K, CL, CO2, BUN, CREATININE, CALCIUM, ANIONGAP, ESTGFRAFRICA, EGFRNONAA  No results found for: CHOL  No results found for: HDL  No results found for: LDLCALC  No results found for: TRIG  No results found for: CHOLHDL  Last set of blood work has been reviewed as noted above.      Health Maintenance  Health Maintenance       Date Due Completion Date    Pneumococcal (65+) (1 of 2 - PCV13) 07/11/2014 ---    Lipid Panel 03/24/2019 3/24/2014 (Done)    Override on 3/24/2014: Done ([facility] - scanned file)    Colonoscopy 03/03/2025 3/3/2015    TETANUS VACCINE 06/09/2026 6/9/2016 (Declined)    Override " on 6/9/2016: Declined

## 2018-03-20 ENCOUNTER — TELEPHONE (OUTPATIENT)
Dept: HEPATOLOGY | Facility: CLINIC | Age: 68
End: 2018-03-20

## 2018-03-20 NOTE — TELEPHONE ENCOUNTER
----- Message from Michaela Maldonado sent at 3/19/2018  4:28 PM CDT -----  Contact: patient   Patient calling to schedule an appointment.           Please call [phone]        Thanks

## 2018-03-20 NOTE — TELEPHONE ENCOUNTER
S/w pt scheduled f/u visit with Jomar since adan is no longer with our office and mailed out appts to the pt today.

## 2018-03-27 ENCOUNTER — LAB VISIT (OUTPATIENT)
Dept: LAB | Facility: HOSPITAL | Age: 68
End: 2018-03-27
Payer: MEDICARE

## 2018-03-27 ENCOUNTER — OFFICE VISIT (OUTPATIENT)
Dept: HEPATOLOGY | Facility: CLINIC | Age: 68
End: 2018-03-27
Payer: MEDICARE

## 2018-03-27 VITALS
TEMPERATURE: 97 F | DIASTOLIC BLOOD PRESSURE: 72 MMHG | WEIGHT: 174 LBS | OXYGEN SATURATION: 99 % | HEIGHT: 71 IN | BODY MASS INDEX: 24.36 KG/M2 | HEART RATE: 69 BPM | RESPIRATION RATE: 18 BRPM | SYSTOLIC BLOOD PRESSURE: 142 MMHG

## 2018-03-27 DIAGNOSIS — B18.2 CHRONIC HEPATITIS C WITHOUT HEPATIC COMA: ICD-10-CM

## 2018-03-27 DIAGNOSIS — B18.2 CHRONIC HEPATITIS C WITHOUT HEPATIC COMA: Primary | ICD-10-CM

## 2018-03-27 LAB
ALBUMIN SERPL BCP-MCNC: 4.5 G/DL
ALP SERPL-CCNC: 52 U/L
ALT SERPL W/O P-5'-P-CCNC: 37 U/L
ANION GAP SERPL CALC-SCNC: 10 MMOL/L
AST SERPL-CCNC: 35 U/L
BASOPHILS # BLD AUTO: 0.05 K/UL
BASOPHILS NFR BLD: 0.9 %
BILIRUB SERPL-MCNC: 1.6 MG/DL
BUN SERPL-MCNC: 15 MG/DL
CALCIUM SERPL-MCNC: 10.1 MG/DL
CHLORIDE SERPL-SCNC: 104 MMOL/L
CO2 SERPL-SCNC: 28 MMOL/L
CREAT SERPL-MCNC: 0.8 MG/DL
DIFFERENTIAL METHOD: ABNORMAL
EOSINOPHIL # BLD AUTO: 0.1 K/UL
EOSINOPHIL NFR BLD: 1.6 %
ERYTHROCYTE [DISTWIDTH] IN BLOOD BY AUTOMATED COUNT: 13 %
EST. GFR  (AFRICAN AMERICAN): >60 ML/MIN/1.73 M^2
EST. GFR  (NON AFRICAN AMERICAN): >60 ML/MIN/1.73 M^2
GLUCOSE SERPL-MCNC: 92 MG/DL
HCT VFR BLD AUTO: 41 %
HGB BLD-MCNC: 13.4 G/DL
IMM GRANULOCYTES # BLD AUTO: 0.01 K/UL
IMM GRANULOCYTES NFR BLD AUTO: 0.2 %
INR PPP: 0.9
LYMPHOCYTES # BLD AUTO: 1.6 K/UL
LYMPHOCYTES NFR BLD: 29.3 %
MCH RBC QN AUTO: 32.3 PG
MCHC RBC AUTO-ENTMCNC: 32.7 G/DL
MCV RBC AUTO: 99 FL
MONOCYTES # BLD AUTO: 0.6 K/UL
MONOCYTES NFR BLD: 11.2 %
NEUTROPHILS # BLD AUTO: 3.1 K/UL
NEUTROPHILS NFR BLD: 56.8 %
NRBC BLD-RTO: 0 /100 WBC
PLATELET # BLD AUTO: 232 K/UL
PMV BLD AUTO: 10.3 FL
POTASSIUM SERPL-SCNC: 4.5 MMOL/L
PROT SERPL-MCNC: 7.6 G/DL
PROTHROMBIN TIME: 9.9 SEC
RBC # BLD AUTO: 4.15 M/UL
SODIUM SERPL-SCNC: 142 MMOL/L
WBC # BLD AUTO: 5.47 K/UL

## 2018-03-27 PROCEDURE — 80053 COMPREHEN METABOLIC PANEL: CPT

## 2018-03-27 PROCEDURE — 87522 HEPATITIS C REVRS TRNSCRPJ: CPT

## 2018-03-27 PROCEDURE — 85610 PROTHROMBIN TIME: CPT

## 2018-03-27 PROCEDURE — 99499 UNLISTED E&M SERVICE: CPT | Mod: S$GLB,,, | Performed by: PHYSICIAN ASSISTANT

## 2018-03-27 PROCEDURE — 82248 BILIRUBIN DIRECT: CPT

## 2018-03-27 PROCEDURE — 3078F DIAST BP <80 MM HG: CPT | Mod: CPTII,S$GLB,, | Performed by: PHYSICIAN ASSISTANT

## 2018-03-27 PROCEDURE — 99214 OFFICE O/P EST MOD 30 MIN: CPT | Mod: S$GLB,,, | Performed by: PHYSICIAN ASSISTANT

## 2018-03-27 PROCEDURE — 99999 PR PBB SHADOW E&M-EST. PATIENT-LVL IV: CPT | Mod: PBBFAC,,, | Performed by: PHYSICIAN ASSISTANT

## 2018-03-27 PROCEDURE — 85025 COMPLETE CBC W/AUTO DIFF WBC: CPT

## 2018-03-27 PROCEDURE — 3077F SYST BP >= 140 MM HG: CPT | Mod: CPTII,S$GLB,, | Performed by: PHYSICIAN ASSISTANT

## 2018-03-27 NOTE — PROGRESS NOTES
HEPATOLOGY CLINIC VISIT NOTE    CHIEF COMPLAINT: Hepatitis C    HISTORY: Patient is a 67 y.o. WM here for follow up. He was previously seen by Kristel Marc PA-C. Prior to establishing with our clinic, followed by [facility], Dr. Lopez.  HCV is genotype 1a.  This is not a new diagnosis. He was approved for Harvoni regimen early 2015, but declined to start due to lack of longterm studies.    He had a fibroscan done at last visit which suggests minimal scarring w/ a score of 4.9kPa, F0-1.  He had a liver biopsy done which noted minimal fibrosis 3-2015.   Abdominal Ultrasound was done 4- by Dr. Lopez which is essenially normal.     Brought with him recent outside labs on 4-4-2017:   Cbc--platelets 221K, normal H&H  INR - 1.0   Normal liver enzymes, normal liver function   Albumin 4.0     He maintains a healthy lifestyle avoids alcohol, exercising daily. . His risk for eun HCV date back to 1972.       Pt feels well today. He reports shoulder pain, seeing PCP for this. He reports trouble sleeping, improved with diphenhydramine.      [place]        Past Medical History:   Diagnosis Date    Tobacco dependence      FHX:   No fhx of liver cancer or liver disease.   Parents lived until their early 90's     ALLERGIES AND MEDICATIONS: reviewed in epic    ROS:   General: denies fever, (+) fatigue   HEENT: denies headaches   Respiratory: denies SOB, cough  CV: denies chest pain or palpatations  GI: denies abdominal pain or nausea     PE:  WDWN, NAD  Alert, oriented and pleasant.   Vitals:    03/27/18 1519   BP: (!) 142/72   Pulse: 69   Resp: 18   Temp: 97 °F (36.1 °C)   HEENT: ncat, eomi, no scleral icterus; normal rom of neck  Lungs: clear bilaterally  Abdomen: soft nontender  Neuro/psych: no asterixis, oriented x3, mood and affect appropriate.   Skin: warm and dry, no c/c/e. No march erythema.  No spider telangectasia     RECENT LABS:  No results found for: HEPAIGG    No results found for: HEPBSAG  No  results found for: HEPBCAB  No results found for: HEPBSAB    There is no immunization history on file for this patient.  Results for SHA LEZAMA (MRN [id number]) as of 2017 10:38   Ref. Range 2015 09:58 2015 10:05   HCV Log Latest Ref Range: <1.08   6.15 (H)   HCV RNA Quant PCR Latest Ref Range: <12 IU/mL  [id number] (H)   HCV, Qualitative Latest Ref Range: NEGATIVE   POSITIVE (A)   Hepatitis C Genotype Unknown 1a      PROCEDURES:   Procedures Fibroscan Procedure      Name: Sha Lezama  Date of Procedure : 2017   :: Kristel Marc PA-C  Diagnosis: HCV  Probe: M     Fibroscan readin.9 KPa     IQR/med:16 %     Fibrosis:F 0-1      FINAL PATHOLOGIC DIAGNOSIS  LIVER BIOPSIES:: CHRONIC HEPATITIS C CHARACTERIZED BY MILD AND FOCAL MODERATE CHRONIC  TRIADITIS WITH FOCAL MINIMAL INTERFACE HEPATITIS. FOCI OF MILD LOBULAR INFLAMMATION.  FOCAL MINIMAL FIBROSIS. NO EVIDENCE OF PORTAL TO PORTAL OR PORTAL TO CENTRAL FIBROSIS.  NO EVIDENCE OF CIRRHOSIS. NO EVIDENCE OF STEATOSIS.  Diagnosed by: Andre Humphrey M.D.  (Electronically Signed: 2015 10:07:17)    ASSESSMENT:  67yo WM w/   1. CHRONIC HEPATITIS C, GENOTYPE 1a, tx naive   essentially normal transaminases and normal synthetic liver function  Fibroscan = F0-1, suggesting no progression since his past liver biopsie in     Concurrent alcohol use:  None   Negative HBsAb, no Hep A IgG available to review.             EDUCATION & DISCUSSION    The natural history of Hepatitis C, including potential progression to cirrhosis was reviewed.   We discussed the increased progression of liver disease secondary to alcohol use; patient was advised to avoid alcohol completely.   Transmission of Hepatitis C was reviewed, including possible sexual transmission. Sexual contacts should be screened.   Patient should avoid sharing personal products such as razors, toothbrushes, etc.   We reviewed that vaccination against Hepatitis A and Hepatitis B is  recommended if patient does not already have immunity.  Recommend avoiding raw seafood.  Limit acetaminophen to 2000mg daily.    Given that he has had hepatitis C for over 40 years and his liver disease has shown no evidence of progression w/ Fibroscan today suggesting minimal scarring , similar to past liver biopsy.  Seems to be no need for urgent treatment.  He understands that w/ deferring treatment, liver disease could progress and that perhaps in the future he may elect to proceed w/ treatment    PLAN:  1. Labs today  2. RTC PRN or yearly

## 2018-03-29 LAB
BILIRUB DIRECT SERPL-MCNC: 0.6 MG/DL
HCV LOG: 6.17 LOG (10) IU/ML
HCV RNA QUANT PCR: ABNORMAL IU/ML
HCV, QUALITATIVE: DETECTED IU/ML

## 2018-04-02 ENCOUNTER — TELEPHONE (OUTPATIENT)
Dept: HEPATOLOGY | Facility: CLINIC | Age: 68
End: 2018-04-02

## 2018-04-02 NOTE — TELEPHONE ENCOUNTER
----- Message from Jomar Gonzalez PA-C sent at 4/2/2018  8:09 AM CDT -----  Please let him know that these labs are stable. His tbili is a little elevated but it's predominantly indirect bilirubin which is not concerning    Please mail him a copy of these labs  Thanks

## 2018-04-03 DIAGNOSIS — M25.511 ACUTE PAIN OF RIGHT SHOULDER: ICD-10-CM

## 2018-04-03 RX ORDER — TIZANIDINE 2 MG/1
TABLET ORAL
Qty: 385 TABLET | Refills: 1 | OUTPATIENT
Start: 2018-04-03

## 2018-04-18 ENCOUNTER — PES CALL (OUTPATIENT)
Dept: ADMINISTRATIVE | Facility: CLINIC | Age: 68
End: 2018-04-18

## 2018-05-15 ENCOUNTER — OFFICE VISIT (OUTPATIENT)
Dept: FAMILY MEDICINE | Facility: CLINIC | Age: 68
End: 2018-05-15
Payer: MEDICARE

## 2018-05-15 VITALS
DIASTOLIC BLOOD PRESSURE: 80 MMHG | HEIGHT: 71 IN | OXYGEN SATURATION: 97 % | WEIGHT: 173.31 LBS | BODY MASS INDEX: 24.26 KG/M2 | TEMPERATURE: 98 F | HEART RATE: 70 BPM | SYSTOLIC BLOOD PRESSURE: 124 MMHG

## 2018-05-15 DIAGNOSIS — M25.511 RIGHT SHOULDER PAIN, UNSPECIFIED CHRONICITY: ICD-10-CM

## 2018-05-15 DIAGNOSIS — R36.1 BLOOD IN SEMEN: Primary | ICD-10-CM

## 2018-05-15 DIAGNOSIS — K92.1 BLOOD IN STOOL: ICD-10-CM

## 2018-05-15 PROCEDURE — 99999 PR PBB SHADOW E&M-EST. PATIENT-LVL III: CPT | Mod: PBBFAC,,, | Performed by: FAMILY MEDICINE

## 2018-05-15 PROCEDURE — 3079F DIAST BP 80-89 MM HG: CPT | Mod: CPTII,S$GLB,, | Performed by: FAMILY MEDICINE

## 2018-05-15 PROCEDURE — 3074F SYST BP LT 130 MM HG: CPT | Mod: CPTII,S$GLB,, | Performed by: FAMILY MEDICINE

## 2018-05-15 PROCEDURE — 99214 OFFICE O/P EST MOD 30 MIN: CPT | Mod: S$GLB,,, | Performed by: FAMILY MEDICINE

## 2018-05-15 NOTE — PROGRESS NOTES
Office Visit    Patient Name: Sha Triplett    : 1950  MRN: [id number]      Assessment/Plan:  Sha Triplett is a 67 y.o. male who presents today for :    Blood in semen  -     Ambulatory referral to Urology    Blood in stool  -last Cscope in  reviewed  -d/w pt that we can watch it for now as it's likely his internal hemorrhoids, if recurs, may need further GI evaluation - pt voices understanding and agrees with this plan.  -advised adequate hydration, high fiber diet with at least 30gm of dietary fiber daily - handout on high fiber food options given to patient.    Right shoulder pain, unspecified chronicity  -resolved  -continue regular stretches, and precautions with overexertion with heavy objects      Follow-up for worsening Sx. Urgent care/ED precautions provided.     This note was created by combination of typed  and Dragon dictation.  Transcription errors may be present.  If there are any questions, please contact me.        ----------------------------------------------------------------------------------------------------------------------      HPI:  Sha is a 67 y.o. male who presents today for:    Rectal Bleeding; blood in semen; and Shoulder Pain      This patient has multiple medical diagnoses as noted below.    This patient is known to me and to this clinic. The patient's last visit with me was on 3/19/2018.    Patient is here today for  Rectal Bleeding; blood in semen; and Shoulder Pain  Rectal bleeding - had 1 episode 8 weeks ago after he wipes himself after a BM - No blood in stool, pt denies that he was straining as his stool is soft. No recurrence since then. No weight changes or bowel habits. No anal pain nor discomfort. He admits that he hadn't been drinking much water lately. His prior C-scope in  showed diverticulosis and internal hemorrhoids.  Blood in semen - started 5-6 weeks ago, no prior episodes,  Has occurred about 6-7 times since then after ejaculation. No  dysuria, hematuria, penile nor scrotal pain nor recent injury. No FHx of prostate cancer nor BPH.  Pt also states that his prior R shoulder pain has resolved after he does regular shoulder stretches. Has no limitations in his activities. He occasionally feels a twinge of pain with certain movements, but it has not affected his daily activities.      Additional ROS    No F/C/wt changes/fatigue  No dysphagia/sore throat/rhinorrhea  No CP/SOB/palpitations/swelling  No cough/wheezing/SOB  No nausea/vomiting/abd pain/no diarrhea, no constipation  No muscle aches/joint pain   No rashes  No weakness/HA/tingling/numbness  No anxiety/depression  No dysuria/hematuria  No polyuria/polydipsia/fatigue/wt changes/cold or hot intolerance  No bleeding/bruising/swelling/lumps in axilla, groin, or neck  No rash, no history of allergies to any specific substances      Patient Care Team:  Jude Kee MD as PCP - General (Family Medicine)        Patient Active Problem List   Diagnosis    Hypertension    Chronic hepatitis C without hepatic coma    Other symptoms involving digestive system(304.01)       Current Medications  Medications reviewed and updated.       Current Outpatient Prescriptions:     b complex vitamins tablet, Take 1 tablet by mouth once daily., Disp: , Rfl:     cyanocobalamin, vitamin B-12, (VITAMIN B-12) 50 mcg tablet, Take 50 mcg by mouth once daily., Disp: , Rfl:     diphenhydrAMINE (BENADRYL) 50 MG tablet, Take 50 mg by mouth nightly as needed for Itching., Disp: , Rfl:     fish oil-omega-3 fatty acids 300-1,000 mg capsule, Take 2 g by mouth once daily., Disp: , Rfl:     milk thistle 175 mg tablet, Take 175 mg by mouth once daily., Disp: , Rfl:     multivitamin capsule, Take 1 capsule by mouth once daily., Disp: , Rfl:     PSYLLIUM SEED, WITH DEXTROSE, (CILIUM ORAL), Take by mouth., Disp: , Rfl:     tiZANidine (ZANAFLEX) 2 MG tablet, Take 1 tablet (2 mg total) by mouth every 6 (six) hours as needed.,  "Disp: 30 tablet, Rfl: 1    Past Surgical History:   Procedure Laterality Date    COLONOSCOPY W/ BIOPSIES  3/4/15    / Dr[facility] Ellis at [facility].    LIVER BIOPSY  1998       Family History   Problem Relation Age of Onset    Alzheimer's disease Mother     Macular degeneration Father        Social History     Social History    Marital status:      Spouse name: N/A    Number of children: N/A    Years of education: N/A     Occupational History    Not on file.     Social History Main Topics    Smoking status: Former Smoker     Packs/day: 0.00    Smokeless tobacco: Never Used    Alcohol use No    Drug use: Unknown    Sexual activity: Not on file     Other Topics Concern    Not on file     Social History Narrative     x 2.  Two bio kids. One adopted.   Retired [employer].  Now a non smoker.  [employer] .             Allergies   Review of patient's allergies indicates:  No Known Allergies          Review of Systems  See HPI      Physical Exam  /80   Pulse 70   Temp 98.4 °F (36.9 °C) (Oral)   Ht 5' 11" (1.803 m)   Wt 78.6 kg (173 lb 4.5 oz)   SpO2 97%   BMI 24.17 kg/m²     GEN: NAD, well developed, pleasant, well nourished  HEENT: NCAT, PERRLA, EOMI, sclera clear, anicteric, O/P clear, MMM with no lesions  NECK: normal, supple with midline trachea, no LAD, no thyromegaly  LUNGS: CTAB, no w/r/r, no increased work of breathing   HEART: RRR, normal S1 and S2, no m/r/g, no edema  ABD: s/nt/nd, NABS  SKIN: normal turgor, no rashes  : normal external exam of anus, no obvious abnormality, no external hemorrhoids seen, no internal hemorrhoids palpated, normal sphincter tone. NEG FOBT  PSYCH: AOx3, appropriate mood and affect  MSK: warm/well perfused, normal ROM in all 4 extremities, no c/c/e. Normal R shoulder ROM with no redness/swelling/TTP.      Labs  No results found for: LABA1C, HGBA1C  Lab Results   Component Value Date     03/27/2018    K 4.5 03/27/2018     03/27/2018    CO2 28 " 03/27/2018    BUN 15 03/27/2018    CREATININE 0.8 03/27/2018    CALCIUM 10.1 03/27/2018    ANIONGAP 10 03/27/2018    ESTGFRAFRICA >60.0 03/27/2018    EGFRNONAA >60.0 03/27/2018     No results found for: CHOL  No results found for: HDL  No results found for: LDLCALC  No results found for: TRIG  No results found for: CHOLHDL  Last set of blood work has been reviewed as noted above.      Health Maintenance  Health Maintenance       Date Due Completion Date    Pneumococcal (65+) (1 of 2 - PCV13) 07/11/2014 ---    Influenza Vaccine 08/01/2018 8/11/2017 (Declined)    Override on 8/11/2017: Declined    Override on 10/20/2014: Done ([place])    Lipid Panel 03/24/2019 3/24/2014 (Done)    Override on 3/24/2014: Done ([facility] - scanned file)    Colonoscopy 03/03/2025 3/3/2015    TETANUS VACCINE 06/09/2026 6/9/2016 (Declined)    Override on 6/9/2016: Declined

## 2018-05-29 ENCOUNTER — OFFICE VISIT (OUTPATIENT)
Dept: UROLOGY | Facility: CLINIC | Age: 68
End: 2018-05-29
Payer: MEDICARE

## 2018-05-29 VITALS
SYSTOLIC BLOOD PRESSURE: 130 MMHG | DIASTOLIC BLOOD PRESSURE: 70 MMHG | HEIGHT: 70 IN | BODY MASS INDEX: 25.28 KG/M2 | HEART RATE: 62 BPM | WEIGHT: 176.56 LBS

## 2018-05-29 DIAGNOSIS — N13.8 BPH WITH OBSTRUCTION/LOWER URINARY TRACT SYMPTOMS: ICD-10-CM

## 2018-05-29 DIAGNOSIS — R36.1 HEMATOSPERMIA: Primary | ICD-10-CM

## 2018-05-29 DIAGNOSIS — N40.1 BPH WITH OBSTRUCTION/LOWER URINARY TRACT SYMPTOMS: ICD-10-CM

## 2018-05-29 DIAGNOSIS — R35.1 NOCTURIA MORE THAN TWICE PER NIGHT: ICD-10-CM

## 2018-05-29 LAB
BILIRUB SERPL-MCNC: NORMAL MG/DL
BLOOD URINE, POC: NORMAL
COLOR, POC UA: YELLOW
GLUCOSE UR QL STRIP: NORMAL
KETONES UR QL STRIP: NORMAL
LEUKOCYTE ESTERASE URINE, POC: NORMAL
NITRITE, POC UA: NORMAL
PH, POC UA: 6
PROTEIN, POC: NORMAL
SPECIFIC GRAVITY, POC UA: 1030
UROBILINOGEN, POC UA: NORMAL

## 2018-05-29 PROCEDURE — 81001 URINALYSIS AUTO W/SCOPE: CPT | Mod: S$GLB,,, | Performed by: UROLOGY

## 2018-05-29 PROCEDURE — 3078F DIAST BP <80 MM HG: CPT | Mod: CPTII,S$GLB,, | Performed by: UROLOGY

## 2018-05-29 PROCEDURE — 87086 URINE CULTURE/COLONY COUNT: CPT

## 2018-05-29 PROCEDURE — 99204 OFFICE O/P NEW MOD 45 MIN: CPT | Mod: 25,S$GLB,, | Performed by: UROLOGY

## 2018-05-29 PROCEDURE — 3075F SYST BP GE 130 - 139MM HG: CPT | Mod: CPTII,S$GLB,, | Performed by: UROLOGY

## 2018-05-29 PROCEDURE — 99999 PR PBB SHADOW E&M-EST. PATIENT-LVL III: CPT | Mod: PBBFAC,,, | Performed by: UROLOGY

## 2018-05-29 RX ORDER — IBUPROFEN 800 MG/1
800 TABLET ORAL EVERY 6 HOURS PRN
Qty: 90 TABLET | Refills: 1 | Status: SHIPPED | OUTPATIENT
Start: 2018-05-29 | End: 2019-12-18

## 2018-05-29 NOTE — LETTER
May 29, 2018      Jude Kee MD  [address] [facility]  [place] [address]           [facility]  [address] [facility] [address]  [place] [address]  Phone: [phone]  Fax: [phone]          Patient: Sha Triplett   MR Number: [id number]   YOB: 1950   Date of Visit: 5/29/2018       Dear Dr. Jude Kee:    Thank you for referring Sha Triplett to me for evaluation. Attached you will find relevant portions of my assessment and plan of care.    If you have questions, please do not hesitate to call me. I look forward to following Sha Triplett along with you.    Sincerely,    HARRIET Vazquez MD    Enclosure  CC:  No Recipients    If you would like to receive this communication electronically, please contact [email or web address] or [phone] to request more information on [employer] access.    For providers and/or their staff who would like to refer a patient to [facility], please contact us through our one-stop-shop provider referral line, [facility], at [phone].    If you feel you have received this communication in error or would no longer like to receive these types of communications, please e-mail [email or web address]

## 2018-05-29 NOTE — PROGRESS NOTES
Subjective:       Patient ID: Sha Triplett is a 67 y.o. male who was referred by Jude Kee MD    Chief Complaint:   Chief Complaint   Patient presents with    Other     new pt coming in for blood in semen an stool        Hematospermia  He has had blood in the semen for the past several weeks.  He denies hematuria or dysuria.  He is not sexually active with another partner.  He denies trauma or perineal pain.  He has some ED but does not want treatment.  He had gonorrhea in [place].      Benign Prostatic Hyperplasia  He patient reports nocturia two times a night. He denies straining, urgency and weak stream. The patient states symptoms are of mild severity. Onset of symptoms was several years ago and was gradual in onset.  He has no personal history and no family history of prostate cancer. He reports a history of no complicating symptoms. He denies flank pain, gross hematuria, kidney stones and recurrent UTI.  He is currently taking no prostate medications..        ACTIVE MEDICAL ISSUES:  Patient Active Problem List   Diagnosis    Hypertension    Chronic hepatitis C without hepatic coma    Other symptoms involving digestive system(387.83)       PAST MEDICAL HISTORY  Past Medical History:   Diagnosis Date    Hepatitis C     Tobacco dependence        PAST SURGICAL HISTORY:  Past Surgical History:   Procedure Laterality Date    COLONOSCOPY W/ BIOPSIES  3/4/15    / Dr[facility].    LIVER BIOPSY  1998       SOCIAL HISTORY:  Social History   Substance Use Topics    Smoking status: Former Smoker     Packs/day: 0.00    Smokeless tobacco: Never Used    Alcohol use No       FAMILY HISTORY:  Family History   Problem Relation Age of Onset    Alzheimer's disease Mother     Macular degeneration Father        ALLERGIES AND MEDICATIONS: updated and reviewed.  Review of patient's allergies indicates:  No Known Allergies  Current Outpatient Prescriptions   Medication Sig    b complex vitamins tablet Take  "1 tablet by mouth once daily.    cyanocobalamin, vitamin B-12, (VITAMIN B-12) 50 mcg tablet Take 50 mcg by mouth once daily.    diphenhydrAMINE (BENADRYL) 50 MG tablet Take 50 mg by mouth nightly as needed for Itching.    fish oil-omega-3 fatty acids 300-1,000 mg capsule Take 2 g by mouth once daily.    milk thistle 175 mg tablet Take 175 mg by mouth once daily.    multivitamin capsule Take 1 capsule by mouth once daily.    PSYLLIUM SEED, WITH DEXTROSE, (CILIUM ORAL) Take by mouth.     No current facility-administered medications for this visit.        Review of Systems   Constitutional: Negative for activity change, fatigue, fever and unexpected weight change.   HENT: Negative for congestion.    Eyes: Negative for redness.   Respiratory: Negative for chest tightness and shortness of breath.    Cardiovascular: Negative for chest pain and leg swelling.   Gastrointestinal: Negative for abdominal pain, constipation, diarrhea, nausea and vomiting.   Genitourinary: Negative for dysuria, flank pain, frequency, hematuria, penile pain, penile swelling, scrotal swelling, testicular pain and urgency.   Musculoskeletal: Negative for arthralgias and back pain.   Neurological: Negative for dizziness and light-headedness.   Psychiatric/Behavioral: Negative for behavioral problems and confusion. The patient is not nervous/anxious.    All other systems reviewed and are negative.      Objective:      Vitals:    05/29/18 1329   BP: 130/70   Pulse: 62   Weight: 80.1 kg (176 lb 9.4 oz)   Height: 5' 10" (1.778 m)     Physical Exam   Nursing note and vitals reviewed.  Constitutional: He is oriented to person, place, and time. He appears well-developed and well-nourished.   HENT:   Head: Normocephalic.   Eyes: Conjunctivae are normal.   Neck: Normal range of motion. No tracheal deviation present. No thyromegaly present.   Cardiovascular: Normal rate and normal heart sounds.    Pulmonary/Chest: Effort normal and breath sounds normal. " No respiratory distress. He has no wheezes.   Abdominal: Soft. He exhibits no distension and no mass. There is no hepatosplenomegaly. There is no tenderness. There is no rebound, no guarding and no CVA tenderness. No hernia. Hernia confirmed negative in the right inguinal area and confirmed negative in the left inguinal area.   Genitourinary: Rectum normal, testes normal and penis normal. Rectal exam shows no external hemorrhoid, no mass and no tenderness. Prostate is enlarged. Prostate is not tender. Right testis shows no mass and no tenderness. Left testis shows no mass and no tenderness.       Musculoskeletal: He exhibits no edema or tenderness.   Lymphadenopathy: No inguinal adenopathy noted on the right or left side.   Neurological: He is alert and oriented to person, place, and time.   Skin: Skin is warm and dry. No rash noted. No erythema.     Psychiatric: He has a normal mood and affect. His behavior is normal. Judgment and thought content normal.       Urine dipstick shows negative for all components.  Micro exam: negative for WBC's or RBC's.    Assessment:       1. Hematospermia    2. BPH with obstruction/lower urinary tract symptoms    3. Nocturia more than twice per night          Plan:       1. Hematospermia  He will likely need a cystoscopy in the future  Ibuprofen x 2 weeks    - POCT urinalysis, dipstick or tablet reag  - Urine culture    2. BPH with obstruction/lower urinary tract symptoms    - Prostate Specific Antigen, Diagnostic; Future    3. Nocturia more than twice per night  Limit evening fluids            No Follow-up on file.

## 2018-05-31 LAB
BACTERIA UR CULT: NO GROWTH
BACTERIA UR CULT: NO GROWTH

## 2018-06-04 ENCOUNTER — LAB VISIT (OUTPATIENT)
Dept: LAB | Facility: HOSPITAL | Age: 68
End: 2018-06-04
Attending: UROLOGY
Payer: MEDICARE

## 2018-06-04 DIAGNOSIS — N40.1 BPH WITH OBSTRUCTION/LOWER URINARY TRACT SYMPTOMS: ICD-10-CM

## 2018-06-04 DIAGNOSIS — N13.8 BPH WITH OBSTRUCTION/LOWER URINARY TRACT SYMPTOMS: ICD-10-CM

## 2018-06-04 LAB — COMPLEXED PSA SERPL-MCNC: 4 NG/ML

## 2018-06-04 PROCEDURE — 36415 COLL VENOUS BLD VENIPUNCTURE: CPT | Mod: PO

## 2018-06-04 PROCEDURE — 84153 ASSAY OF PSA TOTAL: CPT

## 2018-06-26 ENCOUNTER — OFFICE VISIT (OUTPATIENT)
Dept: UROLOGY | Facility: CLINIC | Age: 68
End: 2018-06-26
Payer: MEDICARE

## 2018-06-26 VITALS
HEIGHT: 70 IN | WEIGHT: 172 LBS | SYSTOLIC BLOOD PRESSURE: 128 MMHG | BODY MASS INDEX: 24.62 KG/M2 | RESPIRATION RATE: 16 BRPM | DIASTOLIC BLOOD PRESSURE: 74 MMHG

## 2018-06-26 DIAGNOSIS — R35.1 NOCTURIA: ICD-10-CM

## 2018-06-26 DIAGNOSIS — R36.1 HEMATOSPERMIA: ICD-10-CM

## 2018-06-26 DIAGNOSIS — N40.1 BPH WITH OBSTRUCTION/LOWER URINARY TRACT SYMPTOMS: ICD-10-CM

## 2018-06-26 DIAGNOSIS — N13.8 BPH WITH OBSTRUCTION/LOWER URINARY TRACT SYMPTOMS: ICD-10-CM

## 2018-06-26 LAB
BILIRUB SERPL-MCNC: NORMAL MG/DL
BLOOD URINE, POC: NORMAL
COLOR, POC UA: YELLOW
GLUCOSE UR QL STRIP: NORMAL
KETONES UR QL STRIP: NORMAL
LEUKOCYTE ESTERASE URINE, POC: NORMAL
NITRITE, POC UA: NORMAL
PH, POC UA: 6
PROTEIN, POC: NORMAL
SPECIFIC GRAVITY, POC UA: 1000
UROBILINOGEN, POC UA: NORMAL

## 2018-06-26 PROCEDURE — 99999 PR PBB SHADOW E&M-EST. PATIENT-LVL III: CPT | Mod: PBBFAC,,, | Performed by: NURSE PRACTITIONER

## 2018-06-26 PROCEDURE — 99214 OFFICE O/P EST MOD 30 MIN: CPT | Mod: 25,S$GLB,, | Performed by: NURSE PRACTITIONER

## 2018-06-26 PROCEDURE — 81001 URINALYSIS AUTO W/SCOPE: CPT | Mod: S$GLB,,, | Performed by: NURSE PRACTITIONER

## 2018-06-26 PROCEDURE — 3074F SYST BP LT 130 MM HG: CPT | Mod: CPTII,S$GLB,, | Performed by: NURSE PRACTITIONER

## 2018-06-26 PROCEDURE — 3078F DIAST BP <80 MM HG: CPT | Mod: CPTII,S$GLB,, | Performed by: NURSE PRACTITIONER

## 2018-06-26 NOTE — PROGRESS NOTES
Subjective:       Patient ID: Sha Triplett is a 67 y.o. male who is an established patient of Dr. Vazquez though new to me was last seen in this office 5/29/2018    Chief Complaint:   No chief complaint on file.    Hematospermia  During his initial visit with Dr. Vazquez in 5/2018. Patient reported blood in his semen for the past several weeks.  He denies hematuria or dysuria.  He is not sexually active with another partner.  He denies trauma or perineal pain.  He has some ED but does not want treatment.  He had gonorrhea in [place]. Hematospermia treated with Ibuprofen x 2 weeks. He tells me that he has not had any further occurrences of blood in his semen     Ucx (5/29/18)--negative    Benign Prostatic Hyperplasia  He patient reports nocturia two times a night. He denies straining, urgency and weak stream. The patient states symptoms are of mild severity. Onset of symptoms was several years ago and was gradual in onset.  He has no personal history and no family history of prostate cancer. He reports a history of no complicating symptoms. He denies flank pain, gross hematuria, kidney stones and recurrent UTI.  He is currently taking no prostate medications..    He is here today for PSA results . He denies dysuria, gross hematuria, urinary frequency, urgency, flank pain or fever. Overall he feels well.     ACTIVE MEDICAL ISSUES:  Patient Active Problem List   Diagnosis    Hypertension    Chronic hepatitis C without hepatic coma    Other symptoms involving digestive system(107.51)    Hematospermia    BPH with obstruction/lower urinary tract symptoms    Nocturia       ALLERGIES AND MEDICATIONS: updated and reviewed.  Review of patient's allergies indicates:  No Known Allergies  Current Outpatient Prescriptions   Medication Sig    b complex vitamins tablet Take 1 tablet by mouth once daily.    cyanocobalamin, vitamin B-12, (VITAMIN B-12) 50 mcg tablet Take 50 mcg by mouth once daily.    diphenhydrAMINE  "(BENADRYL) 50 MG tablet Take 50 mg by mouth nightly as needed for Itching.    fish oil-omega-3 fatty acids 300-1,000 mg capsule Take 2 g by mouth once daily.    ibuprofen (ADVIL,MOTRIN) 800 MG tablet Take 1 tablet (800 mg total) by mouth every 6 (six) hours as needed for Pain.    milk thistle 175 mg tablet Take 175 mg by mouth once daily.    multivitamin capsule Take 1 capsule by mouth once daily.    PSYLLIUM SEED, WITH DEXTROSE, (CILIUM ORAL) Take by mouth.     No current facility-administered medications for this visit.        Review of Systems   Constitutional: Negative for activity change, chills, fatigue, fever and unexpected weight change.   Eyes: Negative for discharge, redness and visual disturbance.   Respiratory: Negative for cough, shortness of breath and wheezing.    Cardiovascular: Negative for chest pain and leg swelling.   Gastrointestinal: Negative for abdominal distention, abdominal pain, constipation, diarrhea, nausea and vomiting.   Genitourinary: Negative for decreased urine volume, difficulty urinating, discharge, dysuria, flank pain, frequency, hematuria, penile pain, penile swelling, testicular pain and urgency.   Musculoskeletal: Negative for arthralgias, joint swelling and myalgias.   Skin: Negative for color change and rash.   Neurological: Negative for dizziness and light-headedness.   Psychiatric/Behavioral: Negative for behavioral problems and confusion. The patient is not nervous/anxious.        Objective:      Vitals:    06/26/18 1442   BP: 128/74   Resp: 16   Weight: 78 kg (172 lb)   Height: 5' 10" (1.778 m)     Physical Exam   Constitutional: He is oriented to person, place, and time. He appears well-developed.   HENT:   Head: Normocephalic and atraumatic.   Nose: Nose normal.   Eyes: Conjunctivae are normal. Right eye exhibits no discharge. Left eye exhibits no discharge.   Neck: Normal range of motion. Neck supple. No tracheal deviation present. No thyromegaly present. "   Cardiovascular: Normal rate and regular rhythm.    Pulmonary/Chest: Effort normal. No respiratory distress. He has no wheezes.   Abdominal: Soft. He exhibits no distension. There is no hepatosplenomegaly. There is no tenderness. There is no CVA tenderness. No hernia.   Genitourinary:   Genitourinary Comments: Patient declined exam   Musculoskeletal: Normal range of motion. He exhibits no edema.   Neurological: He is alert and oriented to person, place, and time.   Skin: Skin is warm and dry. No rash noted. No erythema.     Psychiatric: He has a normal mood and affect. His behavior is normal. Judgment normal.       Urine dipstick shows +nitrite, trace protein.      Prostate Specific Antigen, Diagnostic   Order: [id number]   Status:  Final result   Visible to patient:  No (Not Released)   Next appt:  None   Dx:  BPH with obstruction/lower urinary tr...    Ref Range & Units 3wk ago   PSA DIAGNOSTIC 0.00 - 4.00 ng/mL 4.0    Comment: PSA Expected levels:   Hormonal Therapy: <0.05 ng/ml   Prostatectomy: <0.01 ng/ml   Radiation Therapy: <1.00 ng/ml    Resulting Agency  [facility]   Narrative     PSA 3 months      Specimen Collected: 06/04/18 10:31 Last Resulted: 06/04/18 16:52              Reviewed with patient    Assessment:       1. Hematospermia    2. BPH with obstruction/lower urinary tract symptoms    3. Nocturia          Plan:       1. Hematospermia  -resolved  - POCT urinalysis, dipstick or tablet reag    2. BPH with obstruction/lower urinary tract symptoms  -PSA (6/4/18)--4.0  - PROSTATE SPECIFIC ANTIGEN, DIAGNOSTIC; Future    3. Nocturia  -stable  - POCT urinalysis, dipstick or tablet reag            Follow-up in about 3 months (around 9/26/2018) for Review PSA.

## 2018-10-02 ENCOUNTER — LAB VISIT (OUTPATIENT)
Dept: LAB | Facility: HOSPITAL | Age: 68
End: 2018-10-02
Attending: NURSE PRACTITIONER
Payer: MEDICARE

## 2018-10-02 DIAGNOSIS — N40.1 BPH WITH OBSTRUCTION/LOWER URINARY TRACT SYMPTOMS: ICD-10-CM

## 2018-10-02 DIAGNOSIS — N13.8 BPH WITH OBSTRUCTION/LOWER URINARY TRACT SYMPTOMS: ICD-10-CM

## 2018-10-02 LAB — COMPLEXED PSA SERPL-MCNC: 4.4 NG/ML

## 2018-10-02 PROCEDURE — 84153 ASSAY OF PSA TOTAL: CPT

## 2018-10-02 PROCEDURE — 36415 COLL VENOUS BLD VENIPUNCTURE: CPT | Mod: PO

## 2018-10-10 ENCOUNTER — OFFICE VISIT (OUTPATIENT)
Dept: UROLOGY | Facility: CLINIC | Age: 68
End: 2018-10-10
Payer: MEDICARE

## 2018-10-10 VITALS — HEIGHT: 70 IN | BODY MASS INDEX: 25.56 KG/M2 | WEIGHT: 178.56 LBS

## 2018-10-10 DIAGNOSIS — R97.20 ELEVATED PSA: Primary | ICD-10-CM

## 2018-10-10 DIAGNOSIS — R35.1 NOCTURIA MORE THAN TWICE PER NIGHT: ICD-10-CM

## 2018-10-10 DIAGNOSIS — R36.1 HEMATOSPERMIA: ICD-10-CM

## 2018-10-10 DIAGNOSIS — N40.0 BPH WITHOUT OBSTRUCTION/LOWER URINARY TRACT SYMPTOMS: ICD-10-CM

## 2018-10-10 PROCEDURE — 1101F PT FALLS ASSESS-DOCD LE1/YR: CPT | Mod: CPTII,,, | Performed by: UROLOGY

## 2018-10-10 PROCEDURE — 99214 OFFICE O/P EST MOD 30 MIN: CPT | Mod: S$PBB,,, | Performed by: UROLOGY

## 2018-10-10 PROCEDURE — 81001 URINALYSIS AUTO W/SCOPE: CPT | Mod: PBBFAC | Performed by: UROLOGY

## 2018-10-10 PROCEDURE — 99213 OFFICE O/P EST LOW 20 MIN: CPT | Mod: PBBFAC | Performed by: UROLOGY

## 2018-10-10 PROCEDURE — 99999 PR PBB SHADOW E&M-EST. PATIENT-LVL III: CPT | Mod: PBBFAC,,, | Performed by: UROLOGY

## 2018-10-10 RX ORDER — CIPROFLOXACIN 500 MG/1
500 TABLET ORAL 2 TIMES DAILY
Qty: 6 TABLET | Refills: 0 | Status: SHIPPED | OUTPATIENT
Start: 2018-10-10 | End: 2018-10-13

## 2018-10-10 NOTE — PROGRESS NOTES
Subjective:       Patient ID: Sha Triplett is a 68 y.o. male who was last seen in this office 6/26/2018    Chief Complaint:   Chief Complaint   Patient presents with    Follow-up     3 month f/u with psa        Elevated PSA  Patient is here with an elevated PSA. He has no personal history and no family history of prostate cancer.   Previous PSA values are :  PSA DIAGNOSTIC 0.00 - 4.00 ng/mL 4.0    Comment: PSA Expected levels:   Hormonal Therapy: <0.05 ng/ml   Prostatectomy: <0.01 ng/ml   Radiation Therapy: <1.00 ng/ml    Resulting Agency  [facility]      Narrative   Performed by: [facility]   PSA 3 months      Specimen Collected: 06/04/18 10:31   Last Resulted: 06/04/18 16:52        PSA DIAGNOSTIC 0.00 - 4.00 ng/mL 4.4 Abnormally high     Comment: PSA Expected levels:   Hormonal Therapy: <0.05 ng/ml   Prostatectomy: <0.01 ng/ml   Radiation Therapy: <1.00 ng/ml    Resulting Agency  [facility]         Specimen Collected: 10/02/18 15:45   Last Resulted: 10/02/18 21:25            Hematospermia  During his initial visit with Dr. Vazquez in 5/2018. Patient reported blood in his semen for the past several weeks.  He denies hematuria or dysuria.  He is not sexually active with another partner.  He denies trauma or perineal pain.  He has some ED but does not want treatment.  He had gonorrhea in [place]. Hematospermia treated with Ibuprofen x 2 weeks. He tells me that he has not had any further occurrences of blood in his semen     Ucx (5/29/18)--negative    Benign Prostatic Hyperplasia  He patient reports nocturia two times a night. He denies straining, urgency and weak stream. The patient states symptoms are of mild severity. Onset of symptoms was several years ago and was gradual in onset.  He has no personal history and no family history of prostate cancer. He reports a history of no complicating symptoms. He denies flank pain, gross hematuria, kidney stones and recurrent UTI.  He is currently taking no prostate  medications..    ACTIVE MEDICAL ISSUES:  Patient Active Problem List   Diagnosis    Hypertension    Chronic hepatitis C without hepatic coma    Other symptoms involving digestive system(747.52)    Hematospermia    BPH with obstruction/lower urinary tract symptoms    Nocturia       ALLERGIES AND MEDICATIONS: updated and reviewed.  Review of patient's allergies indicates:  No Known Allergies  Current Outpatient Medications   Medication Sig    b complex vitamins tablet Take 1 tablet by mouth once daily.    cyanocobalamin, vitamin B-12, (VITAMIN B-12) 50 mcg tablet Take 50 mcg by mouth once daily.    diphenhydrAMINE (BENADRYL) 50 MG tablet Take 50 mg by mouth nightly as needed for Itching.    fish oil-omega-3 fatty acids 300-1,000 mg capsule Take 2 g by mouth once daily.    ibuprofen (ADVIL,MOTRIN) 800 MG tablet Take 1 tablet (800 mg total) by mouth every 6 (six) hours as needed for Pain.    milk thistle 175 mg tablet Take 175 mg by mouth once daily.    multivitamin capsule Take 1 capsule by mouth once daily.    PSYLLIUM SEED, WITH DEXTROSE, (CILIUM ORAL) Take by mouth.    ciprofloxacin HCl (CIPRO) 500 MG tablet Take 1 tablet (500 mg total) by mouth 2 (two) times daily. for 6 doses     No current facility-administered medications for this visit.        Review of Systems   Constitutional: Negative for activity change, fatigue, fever and unexpected weight change.   HENT: Negative for congestion.    Eyes: Negative for redness.   Respiratory: Negative for chest tightness and shortness of breath.    Cardiovascular: Negative for chest pain and leg swelling.   Gastrointestinal: Negative for abdominal pain, constipation, diarrhea, nausea and vomiting.   Genitourinary: Negative for dysuria, flank pain, frequency, hematuria, penile pain, penile swelling, scrotal swelling, testicular pain and urgency.   Musculoskeletal: Negative for arthralgias and back pain.   Neurological: Negative for dizziness and  "light-headedness.   Psychiatric/Behavioral: Negative for behavioral problems and confusion. The patient is not nervous/anxious.    All other systems reviewed and are negative.      Objective:      Vitals:    10/10/18 1522   Weight: 81 kg (178 lb 9.2 oz)   Height: 5' 10" (1.778 m)     Physical Exam   Nursing note and vitals reviewed.  Constitutional: He is oriented to person, place, and time. He appears well-developed and well-nourished.   HENT:   Head: Normocephalic.   Eyes: Conjunctivae are normal.   Neck: Normal range of motion. Neck supple. No tracheal deviation present. No thyromegaly present.   Cardiovascular: Normal rate and normal heart sounds.    Pulmonary/Chest: Effort normal and breath sounds normal. No respiratory distress. He has no wheezes.   Abdominal: Soft. Bowel sounds are normal. There is no hepatosplenomegaly. There is no tenderness. There is no rebound and no CVA tenderness. No hernia.   Musculoskeletal: Normal range of motion. He exhibits no edema or tenderness.   Lymphadenopathy:     He has no cervical adenopathy.   Neurological: He is alert and oriented to person, place, and time.   Skin: Skin is warm and dry. No rash noted. No erythema.     Psychiatric: He has a normal mood and affect. His behavior is normal. Judgment and thought content normal.       Urine dipstick shows negative for all components.  Micro exam: negative for WBC's or RBC's.    Assessment:       1. Elevated PSA    2. BPH without obstruction/lower urinary tract symptoms    3. Hematospermia    4. Nocturia more than twice per night          Plan:       1. Elevated PSA    - ciprofloxacin HCl (CIPRO) 500 MG tablet; Take 1 tablet (500 mg total) by mouth 2 (two) times daily. for 6 doses  Dispense: 6 tablet; Refill: 0  -  Biopsy Prostate Singl Multi Specimens; Future    2. BPH without obstruction/lower urinary tract symptoms      3. Hematospermia  Ibuprofen (except prior to biopsy)    4. Nocturia more than twice per night        "       Follow-up in about 6 weeks (around 11/21/2018) for Follow up, Prostate Biopsy.

## 2018-11-26 ENCOUNTER — PROCEDURE VISIT (OUTPATIENT)
Dept: UROLOGY | Facility: CLINIC | Age: 68
End: 2018-11-26
Attending: UROLOGY
Payer: MEDICARE

## 2018-11-26 DIAGNOSIS — R97.20 ELEVATED PSA: Primary | ICD-10-CM

## 2018-11-26 PROCEDURE — 76872 US TRANSRECTAL: CPT | Mod: 26,S$GLB,, | Performed by: UROLOGY

## 2018-11-26 PROCEDURE — 88341 IMHCHEM/IMCYTCHM EA ADD ANTB: CPT | Performed by: PATHOLOGY

## 2018-11-26 PROCEDURE — 88341 IMHCHEM/IMCYTCHM EA ADD ANTB: CPT | Mod: 26,,, | Performed by: PATHOLOGY

## 2018-11-26 PROCEDURE — 88305 TISSUE EXAM BY PATHOLOGIST: CPT | Performed by: PATHOLOGY

## 2018-11-26 PROCEDURE — 88342 IMHCHEM/IMCYTCHM 1ST ANTB: CPT | Mod: 26,,, | Performed by: PATHOLOGY

## 2018-11-26 PROCEDURE — 88305 TISSUE EXAM BY PATHOLOGIST: CPT | Mod: 26,,, | Performed by: PATHOLOGY

## 2018-11-26 PROCEDURE — 76942 ECHO GUIDE FOR BIOPSY: CPT | Mod: 26,59,S$GLB, | Performed by: UROLOGY

## 2018-11-26 PROCEDURE — 55700 TRUS: CPT | Mod: S$GLB,,, | Performed by: UROLOGY

## 2018-11-26 RX ORDER — GENTAMICIN SULFATE 40 MG/ML
80 INJECTION, SOLUTION INTRAMUSCULAR; INTRAVENOUS
Status: DISCONTINUED | OUTPATIENT
Start: 2018-11-26 | End: 2019-12-18

## 2018-11-26 NOTE — PROCEDURES
"TRUS  Date/Time: 11/26/2018 10:00 AM  Performed by: HARRIET Vazquez MD  Authorized by: HARRIET Vazquez MD     Consent Done?:  Yes (Written)  Time out: Immediately prior to procedure a "time out" was called to verify the correct patient, procedure, equipment, support staff and site/side marked as required.    Indications: Elevated PSA    Preparation: Patient was prepped and draped in usual sterile fashion    Position:  Left lateral  Anesthesia:  10cc's 1% Lidocaine  Patient sedated: No    Prostate Size:  54 gm  Lesions:: No    Left Base Biopsies: 2  Left Mid Biopsies: 2  Left Mobile Biopsies: 2  Right Base Biopsies: 2  Right Mid Biopsies: 2  Right Mobile Biopsies: 2  Transitional zone: No    Total Biopsies:  12    Patient tolerance:  Patient tolerated the procedure well with no immediate complications      "

## 2018-12-01 ENCOUNTER — NURSE TRIAGE (OUTPATIENT)
Dept: ADMINISTRATIVE | Facility: CLINIC | Age: 68
End: 2018-12-01

## 2018-12-01 NOTE — TELEPHONE ENCOUNTER
Reason for Disposition   Headache (all triage questions negative)    Protocols used:  HEADACHE-A-    Sha reporting a mild intermittent headache and some diarrhea after having a prostate biopsy done last Monday. He states diarrhea started on Wednesday. He had 3 loose stools yesterday and one watery stool this morning. He recently finished prophylactic antibiotics he was prescribed for the biopsy. He reports temp of around 98.8 (wasn't sure of exact number), drinking plenty of fluid and urinating regularly. Advised to monitor symptoms for now and if he develops a fever, diarrhea gets worse despite using probiotics (patient already taking), or any other new or worsening symptoms he should call back.

## 2018-12-03 NOTE — TELEPHONE ENCOUNTER
Called and checked on patient- he reports that the blood in his stool has improved, but still has a low grade fever that is managed with a cold compress and OTC medications. Please advise on any further action needed.

## 2018-12-14 ENCOUNTER — OFFICE VISIT (OUTPATIENT)
Dept: UROLOGY | Facility: CLINIC | Age: 68
End: 2018-12-14
Payer: MEDICARE

## 2018-12-14 VITALS — WEIGHT: 184.94 LBS | BODY MASS INDEX: 26.48 KG/M2 | HEIGHT: 70 IN

## 2018-12-14 DIAGNOSIS — R35.1 NOCTURIA MORE THAN TWICE PER NIGHT: ICD-10-CM

## 2018-12-14 DIAGNOSIS — C61 PROSTATE CANCER: Primary | ICD-10-CM

## 2018-12-14 DIAGNOSIS — R33.9 INCOMPLETE EMPTYING OF BLADDER: ICD-10-CM

## 2018-12-14 LAB
BILIRUB SERPL-MCNC: NORMAL MG/DL
BLOOD URINE, POC: NORMAL
COLOR, POC UA: YELLOW
GLUCOSE UR QL STRIP: NORMAL
KETONES UR QL STRIP: NORMAL
LEUKOCYTE ESTERASE URINE, POC: NORMAL
NITRITE, POC UA: NORMAL
PH, POC UA: 7
PROTEIN, POC: NORMAL
SPECIFIC GRAVITY, POC UA: 1000
UROBILINOGEN, POC UA: NORMAL

## 2018-12-14 PROCEDURE — 1101F PT FALLS ASSESS-DOCD LE1/YR: CPT | Mod: CPTII,S$GLB,, | Performed by: UROLOGY

## 2018-12-14 PROCEDURE — 99999 PR PBB SHADOW E&M-EST. PATIENT-LVL II: CPT | Mod: PBBFAC,,, | Performed by: UROLOGY

## 2018-12-14 PROCEDURE — 99215 OFFICE O/P EST HI 40 MIN: CPT | Mod: 25,S$GLB,, | Performed by: UROLOGY

## 2018-12-14 PROCEDURE — 81001 URINALYSIS AUTO W/SCOPE: CPT | Mod: S$GLB,,, | Performed by: UROLOGY

## 2018-12-14 PROCEDURE — 99499 UNLISTED E&M SERVICE: CPT | Mod: HCNC,S$GLB,, | Performed by: UROLOGY

## 2018-12-14 NOTE — PROGRESS NOTES
Subjective:       Patient ID: Sha Triplett is a 68 y.o. male who was last seen in this office 11/26/2018    Chief Complaint:   Chief Complaint   Patient presents with    Elevated PSA     2 week post op from trus/bx       Follow Up Prostate Biopsy    He underwent a prostate needle biopsy on 11/28/2018.  His biopsy was indicated due to: Elevated PSA.  Afterwards he experienced: Gross Hematuria, Blood in stool and Hematospermia.  These symptoms have resolved.  His PSA prior to biopsy was 4.4.  His prostate size was 55 grams.  The ultrasound did not show a median lobe.  He currently does not have erectile dysfunction.  His pathology showed: prostate cancer.        ACTIVE MEDICAL ISSUES:  Patient Active Problem List   Diagnosis    Hypertension    Chronic hepatitis C without hepatic coma    Other symptoms involving digestive system(587.57)    Hematospermia    BPH with obstruction/lower urinary tract symptoms    Nocturia       ALLERGIES AND MEDICATIONS: updated and reviewed.  Review of patient's allergies indicates:  No Known Allergies  Current Outpatient Medications   Medication Sig    b complex vitamins tablet Take 1 tablet by mouth once daily.    cyanocobalamin, vitamin B-12, (VITAMIN B-12) 50 mcg tablet Take 50 mcg by mouth once daily.    diphenhydrAMINE (BENADRYL) 50 MG tablet Take 50 mg by mouth nightly as needed for Itching.    fish oil-omega-3 fatty acids 300-1,000 mg capsule Take 2 g by mouth once daily.    ibuprofen (ADVIL,MOTRIN) 800 MG tablet Take 1 tablet (800 mg total) by mouth every 6 (six) hours as needed for Pain.    milk thistle 175 mg tablet Take 175 mg by mouth once daily.    multivitamin capsule Take 1 capsule by mouth once daily.    PSYLLIUM SEED, WITH DEXTROSE, (CILIUM ORAL) Take by mouth.     Current Facility-Administered Medications   Medication    gentamicin injection 80 mg       Review of Systems   Constitutional: Negative for activity change, fatigue, fever and unexpected  "weight change.   HENT: Negative for congestion.    Eyes: Negative for redness.   Respiratory: Negative for chest tightness and shortness of breath.    Cardiovascular: Negative for chest pain and leg swelling.   Gastrointestinal: Negative for abdominal pain, constipation, diarrhea, nausea and vomiting.   Genitourinary: Negative for dysuria, flank pain, frequency, hematuria, penile pain, penile swelling, scrotal swelling, testicular pain and urgency.   Musculoskeletal: Negative for arthralgias and back pain.   Neurological: Negative for dizziness and light-headedness.   Psychiatric/Behavioral: Negative for behavioral problems and confusion. The patient is not nervous/anxious.    All other systems reviewed and are negative.      Objective:      Vitals:    12/14/18 1523   Weight: 83.9 kg (184 lb 15.5 oz)   Height: 5' 10" (1.778 m)     Physical Exam   Nursing note and vitals reviewed.  Constitutional: He is oriented to person, place, and time. He appears well-developed and well-nourished.   HENT:   Head: Normocephalic.   Eyes: Conjunctivae are normal.   Neck: Normal range of motion. Neck supple. No tracheal deviation present. No thyromegaly present.   Cardiovascular: Normal rate and normal heart sounds.    Pulmonary/Chest: Effort normal and breath sounds normal. No respiratory distress. He has no wheezes.   Abdominal: Soft. Bowel sounds are normal. There is no hepatosplenomegaly. There is no tenderness. There is no rebound and no CVA tenderness. No hernia.   Musculoskeletal: Normal range of motion. He exhibits no edema or tenderness.   Lymphadenopathy:     He has no cervical adenopathy.   Neurological: He is alert and oriented to person, place, and time.   Skin: Skin is warm and dry. No rash noted. No erythema.     Psychiatric: He has a normal mood and affect. His behavior is normal. Judgment and thought content normal.       Urine dipstick shows negative for all components.  Micro exam: negative for WBC's or " RBC's.    Pathology  1/12 positive for prostate cancer 3+3=6, <5%    Assessment:       1. Prostate cancer    2. Nocturia more than twice per night    3. Incomplete emptying of bladder          Plan:       1. Prostate cancer  We had a long discussion about his options: active surveillance, radiation, surgery, and ADT.  He is electing for active surveillance.    - POCT urinalysis, dipstick or tablet reag  - Prostate Specific Antigen, Diagnostic; Future    2. Nocturia more than twice per night  Limit evening fluids    3. Incomplete emptying of bladder    I spent 60 minutes with the patient of which more than half was spent in direct consultation with the patient in regards to our treatment and plan.                No Follow-up on file.

## 2019-02-12 ENCOUNTER — OFFICE VISIT (OUTPATIENT)
Dept: OPTOMETRY | Facility: CLINIC | Age: 69
End: 2019-02-12
Payer: MEDICARE

## 2019-02-12 DIAGNOSIS — Z01.00 EYE EXAM, ROUTINE: Primary | ICD-10-CM

## 2019-02-12 DIAGNOSIS — H25.13 NUCLEAR SCLEROSIS, BILATERAL: ICD-10-CM

## 2019-02-12 PROCEDURE — 99999 PR PBB SHADOW E&M-EST. PATIENT-LVL II: ICD-10-PCS | Mod: PBBFAC,HCNC,, | Performed by: OPTOMETRIST

## 2019-02-12 PROCEDURE — 92014 COMPRE OPH EXAM EST PT 1/>: CPT | Mod: HCNC,S$GLB,, | Performed by: OPTOMETRIST

## 2019-02-12 PROCEDURE — 99499 RISK ADDL DX/OHS AUDIT: ICD-10-PCS | Mod: HCNC,S$GLB,, | Performed by: OPTOMETRIST

## 2019-02-12 PROCEDURE — 99499 UNLISTED E&M SERVICE: CPT | Mod: HCNC,S$GLB,, | Performed by: OPTOMETRIST

## 2019-02-12 PROCEDURE — 92014 PR EYE EXAM, EST PATIENT,COMPREHESV: ICD-10-PCS | Mod: HCNC,S$GLB,, | Performed by: OPTOMETRIST

## 2019-02-12 PROCEDURE — 99999 PR PBB SHADOW E&M-EST. PATIENT-LVL II: CPT | Mod: PBBFAC,HCNC,, | Performed by: OPTOMETRIST

## 2019-02-12 NOTE — PROGRESS NOTES
HPI     Pt is coming in for annual exam. PT states that va is up and down   depending on the lighting situation. Has some eye strain while reading a   lot. Occasional floaters OU. Denies flashes/pain/irritation OU. Pt uses   OTC tears PRN.     Last edited by Soo Brar on 2/12/2019  1:21 PM. (History)            Assessment /Plan     For exam results, see Encounter Report.    Eye exam, routine  -annual DFE    Nuclear sclerosis, bilateral  -Educated patient on presence of cataracts at today's exam, monitor at annual dilated fundus exam. 6+ years surgical estimate.      RTC 1 yr

## 2019-03-07 ENCOUNTER — LAB VISIT (OUTPATIENT)
Dept: LAB | Facility: HOSPITAL | Age: 69
End: 2019-03-07
Attending: UROLOGY
Payer: MEDICARE

## 2019-03-07 DIAGNOSIS — C61 PROSTATE CANCER: ICD-10-CM

## 2019-03-07 LAB — COMPLEXED PSA SERPL-MCNC: 5.4 NG/ML

## 2019-03-07 PROCEDURE — 36415 COLL VENOUS BLD VENIPUNCTURE: CPT | Mod: HCNC,PO

## 2019-03-07 PROCEDURE — 84153 ASSAY OF PSA TOTAL: CPT | Mod: HCNC

## 2019-03-19 ENCOUNTER — OFFICE VISIT (OUTPATIENT)
Dept: UROLOGY | Facility: CLINIC | Age: 69
End: 2019-03-19
Payer: MEDICARE

## 2019-03-19 VITALS
BODY MASS INDEX: 26.22 KG/M2 | HEART RATE: 68 BPM | DIASTOLIC BLOOD PRESSURE: 72 MMHG | HEIGHT: 70 IN | SYSTOLIC BLOOD PRESSURE: 130 MMHG | WEIGHT: 183.19 LBS

## 2019-03-19 DIAGNOSIS — R33.9 INCOMPLETE EMPTYING OF BLADDER: ICD-10-CM

## 2019-03-19 DIAGNOSIS — C61 PROSTATE CANCER: Primary | ICD-10-CM

## 2019-03-19 DIAGNOSIS — R35.1 NOCTURIA MORE THAN TWICE PER NIGHT: ICD-10-CM

## 2019-03-19 LAB
BILIRUB SERPL-MCNC: ABNORMAL MG/DL
BLOOD URINE, POC: ABNORMAL
COLOR, POC UA: YELLOW
GLUCOSE UR QL STRIP: ABNORMAL
KETONES UR QL STRIP: ABNORMAL
LEUKOCYTE ESTERASE URINE, POC: ABNORMAL
NITRITE, POC UA: ABNORMAL
PH, POC UA: 8
PROTEIN, POC: ABNORMAL
SPECIFIC GRAVITY, POC UA: 1000
UROBILINOGEN, POC UA: ABNORMAL

## 2019-03-19 PROCEDURE — 3078F PR MOST RECENT DIASTOLIC BLOOD PRESSURE < 80 MM HG: ICD-10-PCS | Mod: HCNC,CPTII,S$GLB, | Performed by: UROLOGY

## 2019-03-19 PROCEDURE — 81001 URINALYSIS AUTO W/SCOPE: CPT | Mod: HCNC,S$GLB,, | Performed by: UROLOGY

## 2019-03-19 PROCEDURE — 3078F DIAST BP <80 MM HG: CPT | Mod: HCNC,CPTII,S$GLB, | Performed by: UROLOGY

## 2019-03-19 PROCEDURE — 3075F PR MOST RECENT SYSTOLIC BLOOD PRESS GE 130-139MM HG: ICD-10-PCS | Mod: HCNC,CPTII,S$GLB, | Performed by: UROLOGY

## 2019-03-19 PROCEDURE — 99999 PR PBB SHADOW E&M-EST. PATIENT-LVL III: CPT | Mod: PBBFAC,HCNC,, | Performed by: UROLOGY

## 2019-03-19 PROCEDURE — 3075F SYST BP GE 130 - 139MM HG: CPT | Mod: HCNC,CPTII,S$GLB, | Performed by: UROLOGY

## 2019-03-19 PROCEDURE — 99214 OFFICE O/P EST MOD 30 MIN: CPT | Mod: 25,HCNC,S$GLB, | Performed by: UROLOGY

## 2019-03-19 PROCEDURE — 99999 PR PBB SHADOW E&M-EST. PATIENT-LVL III: ICD-10-PCS | Mod: PBBFAC,HCNC,, | Performed by: UROLOGY

## 2019-03-19 PROCEDURE — 1101F PR PT FALLS ASSESS DOC 0-1 FALLS W/OUT INJ PAST YR: ICD-10-PCS | Mod: HCNC,CPTII,S$GLB, | Performed by: UROLOGY

## 2019-03-19 PROCEDURE — 1101F PT FALLS ASSESS-DOCD LE1/YR: CPT | Mod: HCNC,CPTII,S$GLB, | Performed by: UROLOGY

## 2019-03-19 PROCEDURE — 99214 PR OFFICE/OUTPT VISIT, EST, LEVL IV, 30-39 MIN: ICD-10-PCS | Mod: 25,HCNC,S$GLB, | Performed by: UROLOGY

## 2019-03-19 PROCEDURE — 81001 POCT URINALYSIS, DIPSTICK OR TABLET REAGENT, AUTOMATED, WITH MICROSCOP: ICD-10-PCS | Mod: HCNC,S$GLB,, | Performed by: UROLOGY

## 2019-03-19 NOTE — PROGRESS NOTES
Subjective:       Patient ID: Sha Triplett is a 68 y.o. male who was last seen in this office 12/14/2018    Chief Complaint:   Chief Complaint   Patient presents with    Prostate Cancer     3 month f/u with psa results     Elevated PSA       Prostate Cancer    He underwent a prostate needle biopsy on 11/28/2018.  His biopsy was indicated due to: Elevated PSA.  Afterwards he experienced: Gross Hematuria, Blood in stool and Hematospermia.  These symptoms have resolved.  His PSA prior to biopsy was 4.4.  His prostate size was 55 grams.  The ultrasound did not show a median lobe.  He currently does not have erectile dysfunction.  His pathology showed: prostate cancer.  He is back with a new PSA.    ACTIVE MEDICAL ISSUES:  Patient Active Problem List   Diagnosis    Hypertension    Chronic hepatitis C without hepatic coma    Other symptoms involving digestive system(157.12)    Hematospermia    BPH with obstruction/lower urinary tract symptoms    Nocturia       ALLERGIES AND MEDICATIONS: updated and reviewed.  Review of patient's allergies indicates:  No Known Allergies  Current Outpatient Medications   Medication Sig    b complex vitamins tablet Take 1 tablet by mouth once daily.    cyanocobalamin, vitamin B-12, (VITAMIN B-12) 50 mcg tablet Take 50 mcg by mouth once daily.    diphenhydrAMINE (BENADRYL) 50 MG tablet Take 50 mg by mouth nightly as needed for Itching.    fish oil-omega-3 fatty acids 300-1,000 mg capsule Take 2 g by mouth once daily.    ibuprofen (ADVIL,MOTRIN) 800 MG tablet Take 1 tablet (800 mg total) by mouth every 6 (six) hours as needed for Pain.    milk thistle 175 mg tablet Take 175 mg by mouth once daily.    multivitamin capsule Take 1 capsule by mouth once daily.    PSYLLIUM SEED, WITH DEXTROSE, (CILIUM ORAL) Take by mouth.     Current Facility-Administered Medications   Medication    gentamicin injection 80 mg       Review of Systems   Constitutional: Negative for activity  "change, fatigue, fever and unexpected weight change.   HENT: Negative for congestion.    Eyes: Negative for redness.   Respiratory: Negative for chest tightness and shortness of breath.    Cardiovascular: Negative for chest pain and leg swelling.   Gastrointestinal: Negative for abdominal pain, constipation, diarrhea, nausea and vomiting.   Genitourinary: Negative for dysuria, flank pain, frequency, hematuria, penile pain, penile swelling, scrotal swelling, testicular pain and urgency.   Musculoskeletal: Negative for arthralgias and back pain.   Neurological: Negative for dizziness and light-headedness.   Psychiatric/Behavioral: Negative for behavioral problems and confusion. The patient is not nervous/anxious.    All other systems reviewed and are negative.      Objective:      Vitals:    03/19/19 1406   BP: 130/72   Pulse: 68   Weight: 83.1 kg (183 lb 3.2 oz)   Height: 5' 10" (1.778 m)     Physical Exam   Nursing note and vitals reviewed.  Constitutional: He is oriented to person, place, and time. He appears well-developed and well-nourished.   HENT:   Head: Normocephalic.   Eyes: Conjunctivae are normal.   Neck: Normal range of motion. Neck supple. No tracheal deviation present. No thyromegaly present.   Cardiovascular: Normal rate and normal heart sounds.    Pulmonary/Chest: Effort normal and breath sounds normal. No respiratory distress. He has no wheezes.   Abdominal: Soft. Bowel sounds are normal. There is no hepatosplenomegaly. There is no tenderness. There is no rebound and no CVA tenderness. No hernia.   Musculoskeletal: Normal range of motion. He exhibits no edema or tenderness.   Lymphadenopathy:     He has no cervical adenopathy.   Neurological: He is alert and oriented to person, place, and time.   Skin: Skin is warm and dry. No rash noted. No erythema.     Psychiatric: He has a normal mood and affect. His behavior is normal. Judgment and thought content normal.       Urine dipstick shows negative for " all components.  Micro exam: negative for WBC's or RBC's.    PSA DIAGNOSTIC 0.00 - 4.00 ng/mL 5.4 Abnormally high     Comment: PSA Expected levels:   Hormonal Therapy: <0.05 ng/ml   Prostatectomy: <0.01 ng/ml   Radiation Therapy: <1.00 ng/ml    Resulting Agency  [facility]      Narrative   Performed by: [facility]   PSA 3 months      Specimen Collected: 03/07/19 11:18   Last Resulted: 03/07/19 19:30            Assessment:       1. Prostate cancer    2. Nocturia more than twice per night    3. Incomplete emptying of bladder          Plan:       1. Prostate cancer  Monitor for now.  He may need a biopsy if his PSA continues to rise.    - POCT urinalysis, dipstick or tablet reag  - Prostate Specific Antigen, Diagnostic; Future    2. Nocturia more than twice per night  Limit evening fluids    3. Incomplete emptying of bladder  stable            Follow-up in about 3 months (around 6/19/2019) for Follow up, Review PSA.

## 2019-05-03 ENCOUNTER — OFFICE VISIT (OUTPATIENT)
Dept: FAMILY MEDICINE | Facility: CLINIC | Age: 69
End: 2019-05-03
Payer: MEDICARE

## 2019-05-03 VITALS
HEIGHT: 70 IN | OXYGEN SATURATION: 98 % | WEIGHT: 184.06 LBS | DIASTOLIC BLOOD PRESSURE: 80 MMHG | BODY MASS INDEX: 26.35 KG/M2 | HEART RATE: 66 BPM | SYSTOLIC BLOOD PRESSURE: 134 MMHG

## 2019-05-03 DIAGNOSIS — I10 ESSENTIAL HYPERTENSION: ICD-10-CM

## 2019-05-03 DIAGNOSIS — Z00.00 ENCOUNTER FOR PREVENTIVE HEALTH EXAMINATION: Primary | ICD-10-CM

## 2019-05-03 DIAGNOSIS — B18.2 CHRONIC HEPATITIS C WITHOUT HEPATIC COMA: ICD-10-CM

## 2019-05-03 DIAGNOSIS — C61 PROSTATE CANCER: ICD-10-CM

## 2019-05-03 PROCEDURE — 3075F PR MOST RECENT SYSTOLIC BLOOD PRESS GE 130-139MM HG: ICD-10-PCS | Mod: HCNC,CPTII,S$GLB, | Performed by: NURSE PRACTITIONER

## 2019-05-03 PROCEDURE — 99999 PR PBB SHADOW E&M-EST. PATIENT-LVL IV: CPT | Mod: PBBFAC,HCNC,, | Performed by: NURSE PRACTITIONER

## 2019-05-03 PROCEDURE — 3079F DIAST BP 80-89 MM HG: CPT | Mod: HCNC,CPTII,S$GLB, | Performed by: NURSE PRACTITIONER

## 2019-05-03 PROCEDURE — G0439 PR MEDICARE ANNUAL WELLNESS SUBSEQUENT VISIT: ICD-10-PCS | Mod: HCNC,S$GLB,, | Performed by: NURSE PRACTITIONER

## 2019-05-03 PROCEDURE — 99499 RISK ADDL DX/OHS AUDIT: ICD-10-PCS | Mod: HCNC,S$GLB,, | Performed by: NURSE PRACTITIONER

## 2019-05-03 PROCEDURE — G0439 PPPS, SUBSEQ VISIT: HCPCS | Mod: HCNC,S$GLB,, | Performed by: NURSE PRACTITIONER

## 2019-05-03 PROCEDURE — 99999 PR PBB SHADOW E&M-EST. PATIENT-LVL IV: ICD-10-PCS | Mod: PBBFAC,HCNC,, | Performed by: NURSE PRACTITIONER

## 2019-05-03 PROCEDURE — 3075F SYST BP GE 130 - 139MM HG: CPT | Mod: HCNC,CPTII,S$GLB, | Performed by: NURSE PRACTITIONER

## 2019-05-03 PROCEDURE — 3079F PR MOST RECENT DIASTOLIC BLOOD PRESSURE 80-89 MM HG: ICD-10-PCS | Mod: HCNC,CPTII,S$GLB, | Performed by: NURSE PRACTITIONER

## 2019-05-03 PROCEDURE — 99499 UNLISTED E&M SERVICE: CPT | Mod: HCNC,S$GLB,, | Performed by: NURSE PRACTITIONER

## 2019-05-03 NOTE — PATIENT INSTRUCTIONS
Counseling and Referral of Other Preventative  (Italic type indicates deductible and co-insurance are waived)    Patient Name: Sha Triplett  Today's Date: 5/3/2019    Health Maintenance       Date Due Completion Date    Lipid Panel 03/24/2019 3/24/2014 (Done)    Override on 3/24/2014: Done ([facility] - scanned file)    Influenza Vaccine 08/01/2019 8/11/2017 (Declined)    Override on 8/11/2017: Declined    Override on 10/20/2014: Done ([place])    Colonoscopy 03/03/2025 3/3/2015    TETANUS VACCINE 06/09/2026 6/9/2016 (Declined)    Override on 6/9/2016: Declined        No orders of the defined types were placed in this encounter.    The following information is provided to all patients.  This information is to help you find resources for any of the problems found today that may be affecting your health:                Living healthy guide: www.[facility].[place].[facility]      Understanding Diabetes: www.diabetes.org      Eating healthy: www.cdc.gov/healthyweight      [facility] home safety checklist: www.cdc.gov/steadi/patient.html      Agency on Aging: www.goea.[place].[facility]      Alcoholics anonymous (AA): www.aa.org      Physical Activity: www.dipti.nih.gov/qy4nmpo      Tobacco use: www.quitwithusla.org

## 2019-05-03 NOTE — PROGRESS NOTES
"Sha Triplett presented for a  Medicare AWV and comprehensive Health Risk Assessment today. The following components were reviewed and updated:    · Medical history  · Family History  · Social history  · Allergies and Current Medications  · Health Risk Assessment  · Health Maintenance  · Care Team     ** See Completed Assessments for Annual Wellness Visit within the encounter summary.**       The following assessments were completed:  · Living Situation  · CAGE  · Depression Screening  · Timed Get Up and Go  · Whisper Test  · Cognitive Function Screening  · Nutrition Screening  · ADL Screening  · PAQ Screening    Vitals:    05/03/19 1128   BP: 134/80   BP Location: Left arm   Patient Position: Sitting   BP Method: Large (Manual)   Pulse: 66   SpO2: 98%   Weight: 83.5 kg (184 lb 1.4 oz)   Height: 5' 10" (1.778 m)     Body mass index is 26.41 kg/m².  Physical Exam   Constitutional: He is oriented to person, place, and time.   Cardiovascular: Normal rate.   Pulmonary/Chest: Effort normal.   Musculoskeletal: Normal range of motion.   Neurological: He is alert and oriented to person, place, and time.   Skin: Capillary refill takes less than 2 seconds.   Psychiatric: He has a normal mood and affect. His behavior is normal. Thought content normal.   Vitals reviewed.        Diagnoses and health risks identified today and associated recommendations/orders:    1. Encounter for preventive health examination  Education provided about preventive health examinations and procedures; addressed and discussed patient's health concerns. Additionally, reviewed medical record for risk factors and documented the results during this encounter.    2. Chronic hepatitis C without hepatic coma  Stable, patient evaluated/monitored by [facility]'s transplant & hepatology departments; continue as advised.     3. Prostate cancer  Stable, patient evaluated/monitored by [facility]'s urology dept; continue as advised.     4. Essential " hypertension  Presently at goal. Continue as advised regarding dietary and lifestyle modifications.   - Lipid panel; Future  Reviewed health maintenance, educated about recommended examinations, procedures (labs & images), and immunizations.      Provided Sha with a 5-10 year written screening schedule and personal prevention plan. Recommendations were developed using the USPSTF age appropriate recommendations. Education, counseling, and referrals were provided as needed. After Visit Summary printed and given to patient which includes a list of additional screenings\tests needed.    Follow up in about 1 year (around 5/3/2020) for assessment .    Josemanuel Mccabe Jr, NP  I offered to discuss end of life issues, including information on how to make advance directives that the patient could use to name someone who would make medical decisions on their behalf if they became too ill to make themselves.    ___Patient declined  _X_Patient is interested, I provided paper work and offered to discuss.

## 2019-05-04 ENCOUNTER — LAB VISIT (OUTPATIENT)
Dept: LAB | Facility: HOSPITAL | Age: 69
End: 2019-05-04
Attending: NURSE PRACTITIONER
Payer: MEDICARE

## 2019-05-04 DIAGNOSIS — I10 ESSENTIAL HYPERTENSION: ICD-10-CM

## 2019-05-04 LAB
CHOLEST SERPL-MCNC: 168 MG/DL (ref 120–199)
CHOLEST/HDLC SERPL: 2.6 {RATIO} (ref 2–5)
HDLC SERPL-MCNC: 65 MG/DL (ref 40–75)
HDLC SERPL: 38.7 % (ref 20–50)
LDLC SERPL CALC-MCNC: 91 MG/DL (ref 63–159)
NONHDLC SERPL-MCNC: 103 MG/DL
TRIGL SERPL-MCNC: 60 MG/DL (ref 30–150)

## 2019-05-04 PROCEDURE — 80061 LIPID PANEL: CPT | Mod: HCNC

## 2019-05-04 PROCEDURE — 36415 COLL VENOUS BLD VENIPUNCTURE: CPT | Mod: HCNC,PO

## 2019-05-05 PROBLEM — C61 PROSTATE CANCER: Status: ACTIVE | Noted: 2019-05-05

## 2019-06-12 ENCOUNTER — LAB VISIT (OUTPATIENT)
Dept: LAB | Facility: HOSPITAL | Age: 69
End: 2019-06-12
Attending: UROLOGY
Payer: MEDICARE

## 2019-06-12 DIAGNOSIS — C61 PROSTATE CANCER: ICD-10-CM

## 2019-06-12 LAB — COMPLEXED PSA SERPL-MCNC: 4.6 NG/ML (ref 0–4)

## 2019-06-12 PROCEDURE — 84153 ASSAY OF PSA TOTAL: CPT | Mod: HCNC

## 2019-06-12 PROCEDURE — 36415 COLL VENOUS BLD VENIPUNCTURE: CPT | Mod: HCNC,PO

## 2019-06-18 ENCOUNTER — OFFICE VISIT (OUTPATIENT)
Dept: UROLOGY | Facility: CLINIC | Age: 69
End: 2019-06-18
Payer: MEDICARE

## 2019-06-18 VITALS
DIASTOLIC BLOOD PRESSURE: 80 MMHG | WEIGHT: 184.31 LBS | SYSTOLIC BLOOD PRESSURE: 120 MMHG | BODY MASS INDEX: 26.39 KG/M2 | HEIGHT: 70 IN

## 2019-06-18 DIAGNOSIS — R35.1 NOCTURIA MORE THAN TWICE PER NIGHT: ICD-10-CM

## 2019-06-18 DIAGNOSIS — C61 PROSTATE CANCER: Primary | ICD-10-CM

## 2019-06-18 DIAGNOSIS — R33.9 INCOMPLETE EMPTYING OF BLADDER: ICD-10-CM

## 2019-06-18 PROCEDURE — 81001 URINALYSIS AUTO W/SCOPE: CPT | Mod: HCNC,S$GLB,, | Performed by: UROLOGY

## 2019-06-18 PROCEDURE — 99214 OFFICE O/P EST MOD 30 MIN: CPT | Mod: HCNC,25,S$GLB, | Performed by: UROLOGY

## 2019-06-18 PROCEDURE — 3074F PR MOST RECENT SYSTOLIC BLOOD PRESSURE < 130 MM HG: ICD-10-PCS | Mod: HCNC,CPTII,S$GLB, | Performed by: UROLOGY

## 2019-06-18 PROCEDURE — 3079F DIAST BP 80-89 MM HG: CPT | Mod: HCNC,CPTII,S$GLB, | Performed by: UROLOGY

## 2019-06-18 PROCEDURE — 3074F SYST BP LT 130 MM HG: CPT | Mod: HCNC,CPTII,S$GLB, | Performed by: UROLOGY

## 2019-06-18 PROCEDURE — 3079F PR MOST RECENT DIASTOLIC BLOOD PRESSURE 80-89 MM HG: ICD-10-PCS | Mod: HCNC,CPTII,S$GLB, | Performed by: UROLOGY

## 2019-06-18 PROCEDURE — 81001 POCT URINALYSIS, DIPSTICK OR TABLET REAGENT, AUTOMATED, WITH MICROSCOP: ICD-10-PCS | Mod: HCNC,S$GLB,, | Performed by: UROLOGY

## 2019-06-18 PROCEDURE — 99999 PR PBB SHADOW E&M-EST. PATIENT-LVL III: ICD-10-PCS | Mod: PBBFAC,HCNC,, | Performed by: UROLOGY

## 2019-06-18 PROCEDURE — 99499 UNLISTED E&M SERVICE: CPT | Mod: HCNC,S$GLB,, | Performed by: UROLOGY

## 2019-06-18 PROCEDURE — 99214 PR OFFICE/OUTPT VISIT, EST, LEVL IV, 30-39 MIN: ICD-10-PCS | Mod: HCNC,25,S$GLB, | Performed by: UROLOGY

## 2019-06-18 PROCEDURE — 99499 RISK ADDL DX/OHS AUDIT: ICD-10-PCS | Mod: HCNC,S$GLB,, | Performed by: UROLOGY

## 2019-06-18 PROCEDURE — 99999 PR PBB SHADOW E&M-EST. PATIENT-LVL III: CPT | Mod: PBBFAC,HCNC,, | Performed by: UROLOGY

## 2019-06-18 PROCEDURE — 1101F PR PT FALLS ASSESS DOC 0-1 FALLS W/OUT INJ PAST YR: ICD-10-PCS | Mod: HCNC,CPTII,S$GLB, | Performed by: UROLOGY

## 2019-06-18 PROCEDURE — 1101F PT FALLS ASSESS-DOCD LE1/YR: CPT | Mod: HCNC,CPTII,S$GLB, | Performed by: UROLOGY

## 2019-06-18 NOTE — PROGRESS NOTES
Subjective:       Patient ID: Sha Triplett is a 68 y.o. male who was last seen in this office 3/19/2019    Chief Complaint:   Chief Complaint   Patient presents with    Follow-up     3 month f/u with psa result     Prostate Cancer     He underwent a prostate needle biopsy on 11/28/2018.  His biopsy was indicated due to: Elevated PSA.  Afterwards he experienced: Gross Hematuria, Blood in stool and Hematospermia.  These symptoms have resolved.  His PSA prior to biopsy was 4.4.  His prostate size was 55 grams.  The ultrasound did not show a median lobe.  He currently does not have erectile dysfunction.  His pathology showed: prostate cancer.  He is back with a new PSA.    ACTIVE MEDICAL ISSUES:  Patient Active Problem List   Diagnosis    Hypertension    Chronic hepatitis C without hepatic coma    Other symptoms involving digestive system(077.50)    Hematospermia    BPH with obstruction/lower urinary tract symptoms    Nocturia    Prostate cancer       ALLERGIES AND MEDICATIONS: updated and reviewed.  Review of patient's allergies indicates:  No Known Allergies  Current Outpatient Medications   Medication Sig    b complex vitamins tablet Take 1 tablet by mouth once daily.    cyanocobalamin, vitamin B-12, (VITAMIN B-12) 50 mcg tablet Take 50 mcg by mouth once daily.    diphenhydrAMINE (BENADRYL) 50 MG tablet Take 50 mg by mouth nightly as needed for Itching.    fish oil-omega-3 fatty acids 300-1,000 mg capsule Take 2 g by mouth once daily.    ibuprofen (ADVIL,MOTRIN) 800 MG tablet Take 1 tablet (800 mg total) by mouth every 6 (six) hours as needed for Pain.    milk thistle 175 mg tablet Take 175 mg by mouth once daily.    multivitamin capsule Take 1 capsule by mouth once daily.    PSYLLIUM SEED, WITH DEXTROSE, (CILIUM ORAL) Take by mouth.     Current Facility-Administered Medications   Medication    gentamicin injection 80 mg       Review of Systems   Constitutional: Negative for activity change,  "fatigue, fever and unexpected weight change.   HENT: Negative for congestion.    Eyes: Negative for redness.   Respiratory: Negative for chest tightness and shortness of breath.    Cardiovascular: Negative for chest pain and leg swelling.   Gastrointestinal: Negative for abdominal pain, constipation, diarrhea, nausea and vomiting.   Genitourinary: Negative for dysuria, flank pain, frequency, hematuria, penile pain, penile swelling, scrotal swelling, testicular pain and urgency.   Musculoskeletal: Negative for arthralgias and back pain.   Neurological: Negative for dizziness and light-headedness.   Psychiatric/Behavioral: Negative for behavioral problems and confusion. The patient is not nervous/anxious.    All other systems reviewed and are negative.      Objective:      Vitals:    06/18/19 1349   BP: 120/80   BP Location: Right arm   Patient Position: Sitting   BP Method: Large (Manual)   Weight: 83.6 kg (184 lb 4.9 oz)   Height: 5' 10" (1.778 m)     Physical Exam   Nursing note and vitals reviewed.  Constitutional: He is oriented to person, place, and time. He appears well-developed and well-nourished.   HENT:   Head: Normocephalic.   Eyes: Conjunctivae are normal.   Neck: Normal range of motion. Neck supple. No tracheal deviation present. No thyromegaly present.   Cardiovascular: Normal rate and normal heart sounds.    Pulmonary/Chest: Effort normal and breath sounds normal. No respiratory distress. He has no wheezes.   Abdominal: Soft. Bowel sounds are normal. There is no hepatosplenomegaly. There is no tenderness. There is no rebound and no CVA tenderness. No hernia.   Musculoskeletal: Normal range of motion. He exhibits no edema or tenderness.   Lymphadenopathy:     He has no cervical adenopathy.   Neurological: He is alert and oriented to person, place, and time.   Skin: Skin is warm and dry. No rash noted. No erythema.     Psychiatric: He has a normal mood and affect. His behavior is normal. Judgment and " thought content normal.       Urine dipstick shows negative for all components.  Micro exam: negative for WBC's or RBC's.      PSA DIAGNOSTIC 0.00 - 4.00 ng/mL 4.6High     Comment: PSA Expected levels:   Hormonal Therapy: <0.05 ng/ml   Prostatectomy: <0.01 ng/ml   Radiation Therapy: <1.00 ng/ml    Resulting Agency  [facility]      Narrative   Performed by: [facility]   PSA 3 months      Specimen Collected: 06/12/19 08:45   Last Resulted: 06/12/19 19:37          Assessment:       1. Prostate cancer    2. Nocturia more than twice per night    3. Incomplete emptying of bladder          Plan:       1. Prostate cancer  Continue active surveillance    - POCT urinalysis, dipstick or tablet reag  - Prostate Specific Antigen, Diagnostic; Future    2. Nocturia more than twice per night  Limit evening fluids    3. Incomplete emptying of bladder  stable            Follow up in about 3 months (around 9/18/2019) for Follow up, Review PSA.

## 2019-07-21 ENCOUNTER — HOSPITAL ENCOUNTER (EMERGENCY)
Facility: HOSPITAL | Age: 69
End: 2019-07-22
Attending: EMERGENCY MEDICINE
Payer: MEDICARE

## 2019-07-21 DIAGNOSIS — J93.9 PNEUMOTHORAX, LEFT: ICD-10-CM

## 2019-07-21 DIAGNOSIS — S22.42XA CLOSED FRACTURE OF MULTIPLE RIBS OF LEFT SIDE, INITIAL ENCOUNTER: ICD-10-CM
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
## 2019-07-21 DIAGNOSIS — S27.321A CONTUSION OF LEFT LUNG, INITIAL ENCOUNTER: ICD-10-CM

## 2019-07-21 DIAGNOSIS — S22.009A CLOSED FRACTURE OF TRANSVERSE PROCESS OF THORACIC VERTEBRA, INITIAL ENCOUNTER: Primary | ICD-10-CM

## 2019-07-21 DIAGNOSIS — R52 PAIN: ICD-10-CM

## 2019-07-21 LAB
ALBUMIN SERPL-MCNC: 4.6 G/DL (ref 3.3–5.5)
ALP SERPL-CCNC: 57 U/L (ref 42–141)
BILIRUB SERPL-MCNC: 0.6 MG/DL (ref 0.2–1.6)
BUN SERPL-MCNC: 19 MG/DL (ref 7–22)
CALCIUM SERPL-MCNC: 9.5 MG/DL (ref 8–10.3)
CHLORIDE SERPL-SCNC: 107 MMOL/L (ref 98–108)
CREAT SERPL-MCNC: 0.9 MG/DL (ref 0.6–1.2)
GLUCOSE SERPL-MCNC: 100 MG/DL (ref 73–118)
POC ALT (SGPT): 41 U/L (ref 10–47)
POC AST (SGOT): 48 U/L (ref 11–38)
POC TCO2: 25 MMOL/L (ref 18–33)
POTASSIUM BLD-SCNC: 5.3 MMOL/L (ref 3.6–5.1)
PROTEIN, POC: 7.3 G/DL (ref 6.4–8.1)
SODIUM BLD-SCNC: 141 MMOL/L (ref 128–145)

## 2019-07-21 PROCEDURE — 99291 CRITICAL CARE FIRST HOUR: CPT | Mod: 25,HCNC,ER

## 2019-07-21 PROCEDURE — 80053 COMPREHEN METABOLIC PANEL: CPT | Mod: HCNC,ER

## 2019-07-21 PROCEDURE — 96374 THER/PROPH/DIAG INJ IV PUSH: CPT | Mod: HCNC,ER,59

## 2019-07-21 PROCEDURE — 96376 TX/PRO/DX INJ SAME DRUG ADON: CPT | Mod: HCNC,ER

## 2019-07-21 PROCEDURE — 85025 COMPLETE CBC W/AUTO DIFF WBC: CPT | Mod: HCNC,ER

## 2019-07-21 PROCEDURE — 25500020 PHARM REV CODE 255: Mod: HCNC,ER | Performed by: EMERGENCY MEDICINE

## 2019-07-21 PROCEDURE — 96375 TX/PRO/DX INJ NEW DRUG ADDON: CPT | Mod: HCNC,ER

## 2019-07-21 PROCEDURE — 63600175 PHARM REV CODE 636 W HCPCS: Mod: HCNC,ER | Performed by: NURSE PRACTITIONER

## 2019-07-21 PROCEDURE — 63600175 PHARM REV CODE 636 W HCPCS: Mod: HCNC,ER | Performed by: EMERGENCY MEDICINE

## 2019-07-21 PROCEDURE — 82565 ASSAY OF CREATININE: CPT | Mod: HCNC

## 2019-07-21 RX ORDER — ONDANSETRON 2 MG/ML
4 INJECTION INTRAMUSCULAR; INTRAVENOUS
Status: COMPLETED | OUTPATIENT
Start: 2019-07-21 | End: 2019-07-21

## 2019-07-21 RX ORDER — LIDOCAINE HYDROCHLORIDE AND EPINEPHRINE 10; 10 MG/ML; UG/ML
1 INJECTION, SOLUTION INFILTRATION; PERINEURAL
Status: DISCONTINUED | OUTPATIENT
Start: 2019-07-21 | End: 2019-07-22 | Stop reason: HOSPADM

## 2019-07-21 RX ORDER — MORPHINE SULFATE 8 MG/ML
8 INJECTION INTRAMUSCULAR; INTRAVENOUS; SUBCUTANEOUS
Status: COMPLETED | OUTPATIENT
Start: 2019-07-21 | End: 2019-07-21

## 2019-07-21 RX ORDER — MORPHINE SULFATE 8 MG/ML
4 INJECTION INTRAMUSCULAR; INTRAVENOUS; SUBCUTANEOUS
Status: COMPLETED | OUTPATIENT
Start: 2019-07-21 | End: 2019-07-21

## 2019-07-21 RX ADMIN — MORPHINE SULFATE 8 MG: 8 INJECTION INTRAVENOUS at 11:07

## 2019-07-21 RX ADMIN — ONDANSETRON 4 MG: 2 INJECTION INTRAMUSCULAR; INTRAVENOUS at 10:07

## 2019-07-21 RX ADMIN — MORPHINE SULFATE 4 MG: 8 INJECTION INTRAVENOUS at 10:07

## 2019-07-21 RX ADMIN — IOHEXOL 75 ML: 350 INJECTION, SOLUTION INTRAVENOUS at 10:07

## 2019-07-22 VITALS
WEIGHT: 180 LBS | OXYGEN SATURATION: 96 % | HEIGHT: 70 IN | TEMPERATURE: 99 F | DIASTOLIC BLOOD PRESSURE: 68 MMHG | SYSTOLIC BLOOD PRESSURE: 147 MMHG | HEART RATE: 95 BPM | BODY MASS INDEX: 25.77 KG/M2 | RESPIRATION RATE: 20 BRPM

## 2019-07-22 LAB
CREAT SERPL-MCNC: 0.9 MG/DL (ref 0.5–1.4)
EST. GFR  (AFRICAN AMERICAN): >60 ML/MIN/1.73 M^2
EST. GFR  (NON AFRICAN AMERICAN): >60 ML/MIN/1.73 M^2

## 2019-07-22 PROCEDURE — 90471 IMMUNIZATION ADMIN: CPT | Mod: HCNC,ER | Performed by: EMERGENCY MEDICINE

## 2019-07-22 PROCEDURE — 63600175 PHARM REV CODE 636 W HCPCS: Mod: HCNC,ER | Performed by: EMERGENCY MEDICINE

## 2019-07-22 PROCEDURE — 25000003 PHARM REV CODE 250: Mod: HCNC,ER | Performed by: EMERGENCY MEDICINE

## 2019-07-22 PROCEDURE — 90715 TDAP VACCINE 7 YRS/> IM: CPT | Mod: HCNC,ER | Performed by: EMERGENCY MEDICINE

## 2019-07-22 RX ORDER — SODIUM CHLORIDE 9 MG/ML
500 INJECTION, SOLUTION INTRAVENOUS
Status: COMPLETED | OUTPATIENT
Start: 2019-07-22 | End: 2019-07-22

## 2019-07-22 RX ADMIN — CLOSTRIDIUM TETANI TOXOID ANTIGEN (FORMALDEHYDE INACTIVATED), CORYNEBACTERIUM DIPHTHERIAE TOXOID ANTIGEN (FORMALDEHYDE INACTIVATED), BORDETELLA PERTUSSIS TOXOID ANTIGEN (GLUTARALDEHYDE INACTIVATED), BORDETELLA PERTUSSIS FILAMENTOUS HEMAGGLUTININ ANTIGEN (FORMALDEHYDE INACTIVATED), BORDETELLA PERTUSSIS PERTACTIN ANTIGEN, AND BORDETELLA PERTUSSIS FIMBRIAE 2/3 ANTIGEN 0.5 ML: 5; 2; 2.5; 5; 3; 5 INJECTION, SUSPENSION INTRAMUSCULAR at 12:07

## 2019-07-22 RX ADMIN — SODIUM CHLORIDE 500 ML: 9 INJECTION, SOLUTION INTRAVENOUS at 12:07

## 2019-07-22 NOTE — ED PROVIDER NOTES
Encounter Date: 7/21/2019    SCRIBE #1 NOTE: I, Senait Strong, am scribing for, and in the presence of,  Dr. Canales. I have scribed the following portions of the note - Other sections scribed: HPI, ROS, PE.       History     Chief Complaint   Patient presents with    Fall     cutting a tree. slipped and fell about 8-10 feet after moving his ladder. Left middle back pain and bilateral eblow pain. 1.5 hours PTA     Sha Triplett is a 69 y.o. male who presents to the ED complaining of back pain after falling off a ladder from about 10 ft in the air 1.5 hours PTA. Pt reports ladder slipped forward and he landed on his right shoulder. Pt denies LOC and CP. Pt also reports lacerations to the elbows associated with fall. Pt has Hep C and prostate ca.     The history is provided by the patient. No  was used.     Review of patient's allergies indicates:  No Known Allergies  Past Medical History:   Diagnosis Date    Cancer     prostate     Chronic hepatitis     Hepatitis C     Tobacco dependence      Past Surgical History:   Procedure Laterality Date    BIOPSY-LIVER N/A 3/11/2015    Performed by [facility] OR    COLONOSCOPY N/A 3/3/2015    Performed by Abundio Lopez MD at [facility] ENDO    COLONOSCOPY W/ BIOPSIES  3/4/15    / Dr[facility].    LIVER BIOPSY  1998    trus/bx 2018       Family History   Problem Relation Age of Onset    Alzheimer's disease Mother     Macular degeneration Father      Social History     Tobacco Use    Smoking status: Former Smoker     Packs/day: 0.00    Smokeless tobacco: Never Used   Substance Use Topics    Alcohol use: No    Drug use: No     Review of Systems   Cardiovascular: Negative for chest pain.   Musculoskeletal: Positive for back pain.   Skin: Positive for wound.   Neurological: Negative for syncope.   All other systems reviewed and are negative.      Physical Exam     Initial Vitals [07/21/19 2037]   BP Pulse Resp Temp SpO2    138/75 79 20 98.6 °F (37 °C) 99 %      MAP       --         Physical Exam    Nursing note and vitals reviewed.  Constitutional: He appears well-developed and well-nourished.   HENT:   Head: Normocephalic and atraumatic.   Right Ear: External ear normal.   Left Ear: External ear normal.   Eyes: Conjunctivae are normal.   Neck: Normal range of motion and phonation normal. Neck supple.   Cardiovascular: Normal rate and intact distal pulses.   Pulmonary/Chest: Effort normal. No stridor. No respiratory distress.   Abdominal: Normal appearance.   Musculoskeletal: Normal range of motion. He exhibits no edema.        Thoracic back: He exhibits laceration and pain.   Echymosis along the left post chest wall.  Superficial abrasion on lumbar paraspinal 1 cm. Hematoma overlying ecchymosis about 0.5cm. Pain localized to Left loweer ribb cage posterior axillary line.     Neurological: He is alert and oriented to person, place, and time.   Skin: Skin is warm and dry. Ecchymosis and laceration noted.   2 lacerations. Approximately 0.5 cm on right elbow. Approximately 3 cm superficial abrationich mild adjacent ecchymosis. no bony tenderness   Psychiatric: He has a normal mood and affect. His behavior is normal.         ED Course   Critical Care  Date/Time: 7/22/2019 12:17 AM  Performed by: Michelle Canales MD  Authorized by: Michelle Canales MD   Direct patient critical care time: 10 minutes  Additional history critical care time: 10 minutes  Ordering / reviewing critical care time: 10 minutes  Documentation critical care time: 10 minutes  Consulting other physicians critical care time: 10 minutes  Total critical care time (exclusive of procedural time) : 50 minutes  Critical care was necessary to treat or prevent imminent or life-threatening deterioration of the following conditions: trauma.  Critical care was time spent personally by me on the following activities: re-evaluation of patient's condition, discussions with consultants,  development of treatment plan with patient or surrogate, examination of patient, ordering and review of radiographic studies, ordering and review of laboratory studies, ordering and performing treatments and interventions, obtaining history from patient or surrogate and evaluation of patient's response to treatment.        Labs Reviewed   POCT CMP - Abnormal; Notable for the following components:       Result Value    AST (SGOT), POC 48 (*)     POC Potassium 5.3 (*)     All other components within normal limits   CREATININE, SERUM   POCT CBC   POCT CMP          Imaging Results          CT Thoracic Spine Without Contrast (Final result)  Result time 07/21/19 23:55:29    Final result by Meghan Woo MD (07/21/19 23:55:29)                 Impression:      1. Multiple left posterior displaced and nondisplaced rib fractures involving the left 7th through 10th ribs, as detailed above.  2. Resultant trace left-sided pneumothorax, small amount of layering left hemothorax, with presumed lower lobe pulmonary contusions and small amount of subcutaneous emphysema involving the left posterolateral chest wall.  Follow-up chest radiograph is recommended to ensure pneumothorax remains stable in size.  3. Acute nondisplaced fracture of the T7 left transverse process.  4. No additional acute thoracic spine or lumbar spine fractures seen.  5. No acute intra-abdominal abnormalities identified.  6. Additional findings as detailed above.  Findings were discussed with Dr. Canales at the time of dictation at 23:40.      Electronically signed by: Meghan Woo MD  Date:    07/21/2019  Time:    23:55             Narrative:    EXAMINATION:  CT ABDOMEN PELVIS WITH CONTRAST; CT LUMBAR SPINE WITHOUT CONTRAST; CT THORACIC SPINE WITHOUT CONTRAST    CLINICAL HISTORY:  mid back pain and lower back pain;; fall;; Mid-back/thoracic spine pain, first study;fall;    TECHNIQUE:  CT thoracic spine and CT lumbar spine were performed without the use of IV  contrast.  CT abdomen and pelvis was also performed following administration of 75 cc Omnipaque 350 IV contrast.    CT thoracic spine images were post processed to open up field of view to include full evaluation of the chest.    COMPARISON:  None.    FINDINGS:  Acute displaced fractures are seen involving the posterolateral left 7th, 8th, and 9th ribs.  Additional acute nondisplaced fractures are seen involving the left posteromedial 7th through 10th ribs near the costovertebral junctions.  There is also a nondisplaced acute fracture of the T7 left transverse process.  No additional thoracic spine fractures are seen.  Thoracic spine alignment is within normal limits with no evidence of subluxation.  No right-sided rib fractures are identified.    Resultant trace left-sided pneumothorax is seen with small amount of layering fluid, suspected blood products.  Dependent opacification is seen involving the bilateral lower lobes, much more pronounced on the left as compared to the right likely reflecting pulmonary contusion.  There is subcutaneous emphysema seen involving the left posterior lateral wall.    Heart is normal in size without pericardial effusion.  Ascending thoracic aorta measures upper limits of normal in caliber at 3.9 cm.  Esophagus is unremarkable along its course.    No significant hepatic abnormalities are identified.  There is no intra-or extrahepatic biliary ductal dilatation.  The gallbladder is unremarkable.  The stomach, pancreas, spleen, and adrenal glands are unremarkable.    Kidneys enhance normally with no evidence of hydronephrosis.  Small cyst is visualized within each kidney.  Ureters and urinary bladder are unremarkable.  Prostate is mildly enlarged.    Appendix is visualized and is unremarkable.  The visualized loops of small and large bowel show no evidence of obstruction or inflammation.  There is sigmoid diverticulosis.  No free air or free fluid.    Aorta tapers normally.    Lumbar  spine alignment is within normal limits.  No evidence of acute lumbar spine fracture or dislocation.  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                               X-Ray Elbow 2 Views Left (Final result)  Result time 07/21/19 23:11:50   Procedure changed from X-Ray Elbow Complete Left     Final result by Kevin Pope MD (07/21/19 23:11:50)                 Impression:      There is no evidence of fracture or subluxation.      Electronically signed by: Kevin Pope MD  Date:    07/21/2019  Time:    23:11             Narrative:    EXAMINATION:  XR ELBOW 2 VIEWS LEFT    CLINICAL HISTORY:  fall; Unspecified fall, initial encounter    TECHNIQUE:  AP and lateral views of the left elbow were performed.    COMPARISON:  None    FINDINGS:  No fractures or dislocations.  Unremarkable visualized bony structures. No fat pad elevation.                               X-Ray Elbow 2 Views Right (Final result)  Result time 07/21/19 23:12:01   Procedure changed from X-Ray Elbow Complete Right     Final result by Yaniv Wagner MD (07/21/19 23:12:01)                 Impression:      Posterior soft tissue gas presumably from laceration.    No fracture or effusion or malalignment.      Electronically signed by: Yaniv Wagner  Date:    07/21/2019  Time:    23:12             Narrative:    EXAMINATION:  XR ELBOW 2 VIEWS RIGHT    CLINICAL HISTORY:  fall;  Pain, unspecified    TECHNIQUE:  Frontal and lateral views presented.    COMPARISON:  None    FINDINGS:  No fracture or erosion or periosteal reaction.  The mineralization and joint space and alignment are normal.  No effusion.    There is small amount of irregular soft tissue gas projecting in the posterior soft tissues adjacent to the triceps insertion area of the olecranon.  No foreign body seen.                               CT Lumbar Spine Without Contrast (Final result)  Result time 07/21/19 23:55:29    Final result by Meghan Woo MD  (07/21/19 23:55:29)                 Impression:      1. Multiple left posterior displaced and nondisplaced rib fractures involving the left 7th through 10th ribs, as detailed above.  2. Resultant trace left-sided pneumothorax, small amount of layering left hemothorax, with presumed lower lobe pulmonary contusions and small amount of subcutaneous emphysema involving the left posterolateral chest wall.  Follow-up chest radiograph is recommended to ensure pneumothorax remains stable in size.  3. Acute nondisplaced fracture of the T7 left transverse process.  4. No additional acute thoracic spine or lumbar spine fractures seen.  5. No acute intra-abdominal abnormalities identified.  6. Additional findings as detailed above.  Findings were discussed with Dr. Canales at the time of dictation at 23:40.      Electronically signed by: Meghan Woo MD  Date:    07/21/2019  Time:    23:55             Narrative:    EXAMINATION:  CT ABDOMEN PELVIS WITH CONTRAST; CT LUMBAR SPINE WITHOUT CONTRAST; CT THORACIC SPINE WITHOUT CONTRAST    CLINICAL HISTORY:  mid back pain and lower back pain;; fall;; Mid-back/thoracic spine pain, first study;fall;    TECHNIQUE:  CT thoracic spine and CT lumbar spine were performed without the use of IV contrast.  CT abdomen and pelvis was also performed following administration of 75 cc Omnipaque 350 IV contrast.    CT thoracic spine images were post processed to open up field of view to include full evaluation of the chest.    COMPARISON:  None.    FINDINGS:  Acute displaced fractures are seen involving the posterolateral left 7th, 8th, and 9th ribs.  Additional acute nondisplaced fractures are seen involving the left posteromedial 7th through 10th ribs near the costovertebral junctions.  There is also a nondisplaced acute fracture of the T7 left transverse process.  No additional thoracic spine fractures are seen.  Thoracic spine alignment is within normal limits with no evidence of subluxation.  No  right-sided rib fractures are identified.    Resultant trace left-sided pneumothorax is seen with small amount of layering fluid, suspected blood products.  Dependent opacification is seen involving the bilateral lower lobes, much more pronounced on the left as compared to the right likely reflecting pulmonary contusion.  There is subcutaneous emphysema seen involving the left posterior lateral wall.    Heart is normal in size without pericardial effusion.  Ascending thoracic aorta measures upper limits of normal in caliber at 3.9 cm.  Esophagus is unremarkable along its course.    No significant hepatic abnormalities are identified.  There is no intra-or extrahepatic biliary ductal dilatation.  The gallbladder is unremarkable.  The stomach, pancreas, spleen, and adrenal glands are unremarkable.    Kidneys enhance normally with no evidence of hydronephrosis.  Small cyst is visualized within each kidney.  Ureters and urinary bladder are unremarkable.  Prostate is mildly enlarged.    Appendix is visualized and is unremarkable.  The visualized loops of small and large bowel show no evidence of obstruction or inflammation.  There is sigmoid diverticulosis.  No free air or free fluid.    Aorta tapers normally.    Lumbar spine alignment is within normal limits.  No evidence of acute lumbar spine fracture or dislocation.  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                               CT Abdomen Pelvis With Contrast (Final result)  Result time 07/21/19 23:55:29    Final result by Meghan Woo MD (07/21/19 23:55:29)                 Impression:      1. Multiple left posterior displaced and nondisplaced rib fractures involving the left 7th through 10th ribs, as detailed above.  2. Resultant trace left-sided pneumothorax, small amount of layering left hemothorax, with presumed lower lobe pulmonary contusions and small amount of subcutaneous emphysema involving the left posterolateral chest wall.   Follow-up chest radiograph is recommended to ensure pneumothorax remains stable in size.  3. Acute nondisplaced fracture of the T7 left transverse process.  4. No additional acute thoracic spine or lumbar spine fractures seen.  5. No acute intra-abdominal abnormalities identified.  6. Additional findings as detailed above.  Findings were discussed with Dr. Canales at the time of dictation at 23:40.      Electronically signed by: Meghan Woo MD  Date:    07/21/2019  Time:    23:55             Narrative:    EXAMINATION:  CT ABDOMEN PELVIS WITH CONTRAST; CT LUMBAR SPINE WITHOUT CONTRAST; CT THORACIC SPINE WITHOUT CONTRAST    CLINICAL HISTORY:  mid back pain and lower back pain;; fall;; Mid-back/thoracic spine pain, first study;fall;    TECHNIQUE:  CT thoracic spine and CT lumbar spine were performed without the use of IV contrast.  CT abdomen and pelvis was also performed following administration of 75 cc Omnipaque 350 IV contrast.    CT thoracic spine images were post processed to open up field of view to include full evaluation of the chest.    COMPARISON:  None.    FINDINGS:  Acute displaced fractures are seen involving the posterolateral left 7th, 8th, and 9th ribs.  Additional acute nondisplaced fractures are seen involving the left posteromedial 7th through 10th ribs near the costovertebral junctions.  There is also a nondisplaced acute fracture of the T7 left transverse process.  No additional thoracic spine fractures are seen.  Thoracic spine alignment is within normal limits with no evidence of subluxation.  No right-sided rib fractures are identified.    Resultant trace left-sided pneumothorax is seen with small amount of layering fluid, suspected blood products.  Dependent opacification is seen involving the bilateral lower lobes, much more pronounced on the left as compared to the right likely reflecting pulmonary contusion.  There is subcutaneous emphysema seen involving the left posterior lateral  wall.    Heart is normal in size without pericardial effusion.  Ascending thoracic aorta measures upper limits of normal in caliber at 3.9 cm.  Esophagus is unremarkable along its course.    No significant hepatic abnormalities are identified.  There is no intra-or extrahepatic biliary ductal dilatation.  The gallbladder is unremarkable.  The stomach, pancreas, spleen, and adrenal glands are unremarkable.    Kidneys enhance normally with no evidence of hydronephrosis.  Small cyst is visualized within each kidney.  Ureters and urinary bladder are unremarkable.  Prostate is mildly enlarged.    Appendix is visualized and is unremarkable.  The visualized loops of small and large bowel show no evidence of obstruction or inflammation.  There is sigmoid diverticulosis.  No free air or free fluid.    Aorta tapers normally.    Lumbar spine alignment is within normal limits.  No evidence of acute lumbar spine fracture or dislocation.  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                                 Medical Decision Making:   Clinical Tests:   Lab Tests: Reviewed and Ordered       <> Summary of Lab: WBC 13.1  H&H 13.7/41.7  MCV 96.0  RDW% 13.1      Radiological Study: Reviewed and Ordered  ED Management:  11:50 PM Spoke with Dr. Camp at [facility] accepted pt for admission to trauma service.  12:15 AM Spoke with pt about transfer to [facility].     Labs Reviewed  Admission on 07/21/2019   Component Date Value Ref Range Status    Creatinine 07/21/2019 0.9  0.5 - 1.4 mg/dL Final    eGFR if African American 07/21/2019 >60  >60 mL/min/1.73 m^2 Final    eGFR if non African American 07/21/2019 >60  >60 mL/min/1.73 m^2 Final    Comment: Calculation used to obtain the estimated glomerular filtration  rate (eGFR) is the CKD-EPI equation.       Albumin, POC 07/21/2019 4.6  3.3 - 5.5 g/dL Final    Alkaline Phosphatase, POC 07/21/2019 57  42 - 141 U/L Final    ALT (SGPT), POC 07/21/2019 41  10 - 47 U/L Final     AST (SGOT), POC 07/21/2019 48* 11 - 38 U/L Final    POC BUN 07/21/2019 19  7 - 22 mg/dL Final    Calcium, POC 07/21/2019 9.5  8 - 10.3 mg/dL Final    POC Chloride 07/21/2019 107  98 - 108 mmol/L Final    POC Creatinine 07/21/2019 0.9  0.6 - 1.2 mg/dL Final    POC Glucose 07/21/2019 100  73 - 118 mg/dL Final    POC Potassium 07/21/2019 5.3* 3.6 - 5.1 mmol/L Final    POC Sodium 07/21/2019 141  128 - 145 mmol/L Final    Bilirubin 07/21/2019 0.6  0.2 - 1.6 mg/dL Final    POC TCO2 07/21/2019 25  18 - 33 mmol/L Final    Protein 07/21/2019 7.3  6.4 - 8.1 g/dL Final        Imaging Reviewed    Imaging Results          CT Thoracic Spine Without Contrast (Final result)  Result time 07/21/19 23:55:29    Final result by Meghan Woo MD (07/21/19 23:55:29)                 Impression:      1. Multiple left posterior displaced and nondisplaced rib fractures involving the left 7th through 10th ribs, as detailed above.  2. Resultant trace left-sided pneumothorax, small amount of layering left hemothorax, with presumed lower lobe pulmonary contusions and small amount of subcutaneous emphysema involving the left posterolateral chest wall.  Follow-up chest radiograph is recommended to ensure pneumothorax remains stable in size.  3. Acute nondisplaced fracture of the T7 left transverse process.  4. No additional acute thoracic spine or lumbar spine fractures seen.  5. No acute intra-abdominal abnormalities identified.  6. Additional findings as detailed above.  Findings were discussed with Dr. Canales at the time of dictation at 23:40.      Electronically signed by: Meghan Woo MD  Date:    07/21/2019  Time:    23:55             Narrative:    EXAMINATION:  CT ABDOMEN PELVIS WITH CONTRAST; CT LUMBAR SPINE WITHOUT CONTRAST; CT THORACIC SPINE WITHOUT CONTRAST    CLINICAL HISTORY:  mid back pain and lower back pain;; fall;; Mid-back/thoracic spine pain, first study;fall;    TECHNIQUE:  CT thoracic spine and CT lumbar  spine were performed without the use of IV contrast.  CT abdomen and pelvis was also performed following administration of 75 cc Omnipaque 350 IV contrast.    CT thoracic spine images were post processed to open up field of view to include full evaluation of the chest.    COMPARISON:  None.    FINDINGS:  Acute displaced fractures are seen involving the posterolateral left 7th, 8th, and 9th ribs.  Additional acute nondisplaced fractures are seen involving the left posteromedial 7th through 10th ribs near the costovertebral junctions.  There is also a nondisplaced acute fracture of the T7 left transverse process.  No additional thoracic spine fractures are seen.  Thoracic spine alignment is within normal limits with no evidence of subluxation.  No right-sided rib fractures are identified.    Resultant trace left-sided pneumothorax is seen with small amount of layering fluid, suspected blood products.  Dependent opacification is seen involving the bilateral lower lobes, much more pronounced on the left as compared to the right likely reflecting pulmonary contusion.  There is subcutaneous emphysema seen involving the left posterior lateral wall.    Heart is normal in size without pericardial effusion.  Ascending thoracic aorta measures upper limits of normal in caliber at 3.9 cm.  Esophagus is unremarkable along its course.    No significant hepatic abnormalities are identified.  There is no intra-or extrahepatic biliary ductal dilatation.  The gallbladder is unremarkable.  The stomach, pancreas, spleen, and adrenal glands are unremarkable.    Kidneys enhance normally with no evidence of hydronephrosis.  Small cyst is visualized within each kidney.  Ureters and urinary bladder are unremarkable.  Prostate is mildly enlarged.    Appendix is visualized and is unremarkable.  The visualized loops of small and large bowel show no evidence of obstruction or inflammation.  There is sigmoid diverticulosis.  No free air or free  fluid.    Aorta tapers normally.    Lumbar spine alignment is within normal limits.  No evidence of acute lumbar spine fracture or dislocation.  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                               X-Ray Elbow 2 Views Left (Final result)  Result time 07/21/19 23:11:50   Procedure changed from X-Ray Elbow Complete Left     Final result by Kevin Pope MD (07/21/19 23:11:50)                 Impression:      There is no evidence of fracture or subluxation.      Electronically signed by: Kevin Pope MD  Date:    07/21/2019  Time:    23:11             Narrative:    EXAMINATION:  XR ELBOW 2 VIEWS LEFT    CLINICAL HISTORY:  fall; Unspecified fall, initial encounter    TECHNIQUE:  AP and lateral views of the left elbow were performed.    COMPARISON:  None    FINDINGS:  No fractures or dislocations.  Unremarkable visualized bony structures. No fat pad elevation.                               X-Ray Elbow 2 Views Right (Final result)  Result time 07/21/19 23:12:01   Procedure changed from X-Ray Elbow Complete Right     Final result by Yaniv Wagner MD (07/21/19 23:12:01)                 Impression:      Posterior soft tissue gas presumably from laceration.    No fracture or effusion or malalignment.      Electronically signed by: Yaniv Wagner  Date:    07/21/2019  Time:    23:12             Narrative:    EXAMINATION:  XR ELBOW 2 VIEWS RIGHT    CLINICAL HISTORY:  fall;  Pain, unspecified    TECHNIQUE:  Frontal and lateral views presented.    COMPARISON:  None    FINDINGS:  No fracture or erosion or periosteal reaction.  The mineralization and joint space and alignment are normal.  No effusion.    There is small amount of irregular soft tissue gas projecting in the posterior soft tissues adjacent to the triceps insertion area of the olecranon.  No foreign body seen.                               CT Lumbar Spine Without Contrast (Final result)  Result time 07/21/19 23:55:29     Final result by Meghan Woo MD (07/21/19 23:55:29)                 Impression:      1. Multiple left posterior displaced and nondisplaced rib fractures involving the left 7th through 10th ribs, as detailed above.  2. Resultant trace left-sided pneumothorax, small amount of layering left hemothorax, with presumed lower lobe pulmonary contusions and small amount of subcutaneous emphysema involving the left posterolateral chest wall.  Follow-up chest radiograph is recommended to ensure pneumothorax remains stable in size.  3. Acute nondisplaced fracture of the T7 left transverse process.  4. No additional acute thoracic spine or lumbar spine fractures seen.  5. No acute intra-abdominal abnormalities identified.  6. Additional findings as detailed above.  Findings were discussed with Dr. Canales at the time of dictation at 23:40.      Electronically signed by: Meghan Woo MD  Date:    07/21/2019  Time:    23:55             Narrative:    EXAMINATION:  CT ABDOMEN PELVIS WITH CONTRAST; CT LUMBAR SPINE WITHOUT CONTRAST; CT THORACIC SPINE WITHOUT CONTRAST    CLINICAL HISTORY:  mid back pain and lower back pain;; fall;; Mid-back/thoracic spine pain, first study;fall;    TECHNIQUE:  CT thoracic spine and CT lumbar spine were performed without the use of IV contrast.  CT abdomen and pelvis was also performed following administration of 75 cc Omnipaque 350 IV contrast.    CT thoracic spine images were post processed to open up field of view to include full evaluation of the chest.    COMPARISON:  None.    FINDINGS:  Acute displaced fractures are seen involving the posterolateral left 7th, 8th, and 9th ribs.  Additional acute nondisplaced fractures are seen involving the left posteromedial 7th through 10th ribs near the costovertebral junctions.  There is also a nondisplaced acute fracture of the T7 left transverse process.  No additional thoracic spine fractures are seen.  Thoracic spine alignment is within normal limits  with no evidence of subluxation.  No right-sided rib fractures are identified.    Resultant trace left-sided pneumothorax is seen with small amount of layering fluid, suspected blood products.  Dependent opacification is seen involving the bilateral lower lobes, much more pronounced on the left as compared to the right likely reflecting pulmonary contusion.  There is subcutaneous emphysema seen involving the left posterior lateral wall.    Heart is normal in size without pericardial effusion.  Ascending thoracic aorta measures upper limits of normal in caliber at 3.9 cm.  Esophagus is unremarkable along its course.    No significant hepatic abnormalities are identified.  There is no intra-or extrahepatic biliary ductal dilatation.  The gallbladder is unremarkable.  The stomach, pancreas, spleen, and adrenal glands are unremarkable.    Kidneys enhance normally with no evidence of hydronephrosis.  Small cyst is visualized within each kidney.  Ureters and urinary bladder are unremarkable.  Prostate is mildly enlarged.    Appendix is visualized and is unremarkable.  The visualized loops of small and large bowel show no evidence of obstruction or inflammation.  There is sigmoid diverticulosis.  No free air or free fluid.    Aorta tapers normally.    Lumbar spine alignment is within normal limits.  No evidence of acute lumbar spine fracture or dislocation.  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                               CT Abdomen Pelvis With Contrast (Final result)  Result time 07/21/19 23:55:29    Final result by Meghan Woo MD (07/21/19 23:55:29)                 Impression:      1. Multiple left posterior displaced and nondisplaced rib fractures involving the left 7th through 10th ribs, as detailed above.  2. Resultant trace left-sided pneumothorax, small amount of layering left hemothorax, with presumed lower lobe pulmonary contusions and small amount of subcutaneous emphysema involving  the left posterolateral chest wall.  Follow-up chest radiograph is recommended to ensure pneumothorax remains stable in size.  3. Acute nondisplaced fracture of the T7 left transverse process.  4. No additional acute thoracic spine or lumbar spine fractures seen.  5. No acute intra-abdominal abnormalities identified.  6. Additional findings as detailed above.  Findings were discussed with Dr. Canales at the time of dictation at 23:40.      Electronically signed by: Meghan Woo MD  Date:    07/21/2019  Time:    23:55             Narrative:    EXAMINATION:  CT ABDOMEN PELVIS WITH CONTRAST; CT LUMBAR SPINE WITHOUT CONTRAST; CT THORACIC SPINE WITHOUT CONTRAST    CLINICAL HISTORY:  mid back pain and lower back pain;; fall;; Mid-back/thoracic spine pain, first study;fall;    TECHNIQUE:  CT thoracic spine and CT lumbar spine were performed without the use of IV contrast.  CT abdomen and pelvis was also performed following administration of 75 cc Omnipaque 350 IV contrast.    CT thoracic spine images were post processed to open up field of view to include full evaluation of the chest.    COMPARISON:  None.    FINDINGS:  Acute displaced fractures are seen involving the posterolateral left 7th, 8th, and 9th ribs.  Additional acute nondisplaced fractures are seen involving the left posteromedial 7th through 10th ribs near the costovertebral junctions.  There is also a nondisplaced acute fracture of the T7 left transverse process.  No additional thoracic spine fractures are seen.  Thoracic spine alignment is within normal limits with no evidence of subluxation.  No right-sided rib fractures are identified.    Resultant trace left-sided pneumothorax is seen with small amount of layering fluid, suspected blood products.  Dependent opacification is seen involving the bilateral lower lobes, much more pronounced on the left as compared to the right likely reflecting pulmonary contusion.  There is subcutaneous emphysema seen  involving the left posterior lateral wall.    Heart is normal in size without pericardial effusion.  Ascending thoracic aorta measures upper limits of normal in caliber at 3.9 cm.  Esophagus is unremarkable along its course.    No significant hepatic abnormalities are identified.  There is no intra-or extrahepatic biliary ductal dilatation.  The gallbladder is unremarkable.  The stomach, pancreas, spleen, and adrenal glands are unremarkable.    Kidneys enhance normally with no evidence of hydronephrosis.  Small cyst is visualized within each kidney.  Ureters and urinary bladder are unremarkable.  Prostate is mildly enlarged.    Appendix is visualized and is unremarkable.  The visualized loops of small and large bowel show no evidence of obstruction or inflammation.  There is sigmoid diverticulosis.  No free air or free fluid.    Aorta tapers normally.    Lumbar spine alignment is within normal limits.  No evidence of acute lumbar spine fracture or dislocation.  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                                Medications given in ED    Medications   lidocaine-EPINEPHrine 1%-1:100,000 injection 1 mL (has no administration in time range)   neomycin-bacitracnZn-polymyxnB packet 1 each (has no administration in time range)   0.9%  NaCl infusion (has no administration in time range)   Tdap vaccine injection 0.5 mL (has no administration in time range)   morphine injection 4 mg (4 mg Intravenous Given 7/21/19 2202)   ondansetron injection 4 mg (4 mg Intravenous Given 7/21/19 2200)   iohexol (OMNIPAQUE 350) injection 100 mL (75 mLs Intravenous Given 7/21/19 2256)   morphine injection 8 mg (8 mg Intravenous Given 7/21/19 2354)       This document was produced by a scribe under my direction and in my presence. I agree with the content of the note and have made any necessary edits.     Michelle Canales MD         Note was created using voice recognition software. Note may have  occasional typographical errors that may not have been identified and edited despite good jayde initial review prior to signing.            Scribe Attestation:   Scribe #1: I performed the above scribed service and the documentation accurately describes the services I performed. I attest to the accuracy of the note.            ED Course as of Jul 22 0030   Sun Jul 21, 2019   1356 Transfer center contacted to arrange admission     [DL]      ED Course User Index  [DL] Michelle Canales MD     Clinical Impression:     1. Closed fracture of transverse process of thoracic vertebra, initial encounter    2. Fall    3. Pain    4. Closed fracture of multiple ribs of left side, initial encounter    5. Contusion of left lung, initial encounter    6. Pneumothorax, left trace            Disposition:   Disposition: Transferred  Condition: Stable  Reason for referral: polytrauma  [facility] - accepted by Dr. Conrado Canales MD  08/06/19 0140

## 2019-07-25 ENCOUNTER — OFFICE VISIT (OUTPATIENT)
Dept: FAMILY MEDICINE | Facility: CLINIC | Age: 69
End: 2019-07-25
Payer: MEDICARE

## 2019-07-25 VITALS
HEART RATE: 69 BPM | DIASTOLIC BLOOD PRESSURE: 90 MMHG | WEIGHT: 178 LBS | HEIGHT: 70 IN | BODY MASS INDEX: 25.48 KG/M2 | SYSTOLIC BLOOD PRESSURE: 150 MMHG | TEMPERATURE: 98 F | OXYGEN SATURATION: 99 %

## 2019-07-25 DIAGNOSIS — M54.50 ACUTE BILATERAL LOW BACK PAIN WITHOUT SCIATICA: ICD-10-CM

## 2019-07-25 DIAGNOSIS — S27.321D CONTUSION OF LEFT LUNG, SUBSEQUENT ENCOUNTER: ICD-10-CM

## 2019-07-25 DIAGNOSIS — Z09 FOLLOW UP: Primary | ICD-10-CM
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
## 2019-07-25 DIAGNOSIS — M62.838 MUSCLE SPASM: ICD-10-CM

## 2019-07-25 DIAGNOSIS — S27.0XXD TRAUMATIC PNEUMOTHORAX, SUBSEQUENT ENCOUNTER: ICD-10-CM

## 2019-07-25 DIAGNOSIS — K59.09 OTHER CONSTIPATION: ICD-10-CM

## 2019-07-25 DIAGNOSIS — S22.009D CLOSED FRACTURE OF TRANSVERSE PROCESS OF THORACIC VERTEBRA WITH ROUTINE HEALING, SUBSEQUENT ENCOUNTER: ICD-10-CM

## 2019-07-25 DIAGNOSIS — S22.42XD CLOSED FRACTURE OF MULTIPLE RIBS OF LEFT SIDE WITH ROUTINE HEALING, SUBSEQUENT ENCOUNTER: ICD-10-CM

## 2019-07-25 PROCEDURE — 99214 PR OFFICE/OUTPT VISIT, EST, LEVL IV, 30-39 MIN: ICD-10-PCS | Mod: HCNC,S$GLB,, | Performed by: FAMILY MEDICINE

## 2019-07-25 PROCEDURE — 3077F PR MOST RECENT SYSTOLIC BLOOD PRESSURE >= 140 MM HG: ICD-10-PCS | Mod: HCNC,CPTII,S$GLB, | Performed by: FAMILY MEDICINE

## 2019-07-25 PROCEDURE — 1101F PR PT FALLS ASSESS DOC 0-1 FALLS W/OUT INJ PAST YR: ICD-10-PCS | Mod: HCNC,CPTII,S$GLB, | Performed by: FAMILY MEDICINE

## 2019-07-25 PROCEDURE — 1101F PT FALLS ASSESS-DOCD LE1/YR: CPT | Mod: HCNC,CPTII,S$GLB, | Performed by: FAMILY MEDICINE

## 2019-07-25 PROCEDURE — 3077F SYST BP >= 140 MM HG: CPT | Mod: HCNC,CPTII,S$GLB, | Performed by: FAMILY MEDICINE

## 2019-07-25 PROCEDURE — 3080F DIAST BP >= 90 MM HG: CPT | Mod: HCNC,CPTII,S$GLB, | Performed by: FAMILY MEDICINE

## 2019-07-25 PROCEDURE — 3080F PR MOST RECENT DIASTOLIC BLOOD PRESSURE >= 90 MM HG: ICD-10-PCS | Mod: HCNC,CPTII,S$GLB, | Performed by: FAMILY MEDICINE

## 2019-07-25 PROCEDURE — 99999 PR PBB SHADOW E&M-EST. PATIENT-LVL III: ICD-10-PCS | Mod: PBBFAC,HCNC,, | Performed by: FAMILY MEDICINE

## 2019-07-25 PROCEDURE — 99999 PR PBB SHADOW E&M-EST. PATIENT-LVL III: CPT | Mod: PBBFAC,HCNC,, | Performed by: FAMILY MEDICINE

## 2019-07-25 PROCEDURE — 99214 OFFICE O/P EST MOD 30 MIN: CPT | Mod: HCNC,S$GLB,, | Performed by: FAMILY MEDICINE

## 2019-07-25 RX ORDER — DOCUSATE SODIUM 100 MG/1
100 CAPSULE, LIQUID FILLED ORAL 2 TIMES DAILY PRN
Qty: 180 CAPSULE | Refills: 2 | Status: SHIPPED | OUTPATIENT
Start: 2019-07-25 | End: 2019-07-25 | Stop reason: SDUPTHER

## 2019-07-25 RX ORDER — ACETAMINOPHEN 325 MG/1
650 TABLET ORAL
COMMUNITY
Start: 2019-07-23 | End: 2019-08-02

## 2019-07-25 RX ORDER — GABAPENTIN 100 MG/1
300 CAPSULE ORAL
Status: ON HOLD | COMMUNITY
Start: 2019-07-23 | End: 2021-07-06 | Stop reason: CLARIF

## 2019-07-25 RX ORDER — TIZANIDINE 4 MG/1
4 TABLET ORAL EVERY 8 HOURS
Qty: 90 TABLET | Refills: 1 | Status: SHIPPED | OUTPATIENT
Start: 2019-07-25 | End: 2021-04-22

## 2019-07-25 RX ORDER — TIZANIDINE 4 MG/1
TABLET ORAL
Qty: 270 TABLET | Refills: 1 | OUTPATIENT
Start: 2019-07-25

## 2019-07-25 RX ORDER — DOCUSATE SODIUM 100 MG/1
100 CAPSULE, LIQUID FILLED ORAL 2 TIMES DAILY PRN
Qty: 180 CAPSULE | Refills: 2 | Status: SHIPPED | OUTPATIENT
Start: 2019-07-25 | End: 2021-04-22

## 2019-07-25 RX ORDER — LIDOCAINE 50 MG/G
1 PATCH TOPICAL
COMMUNITY
Start: 2019-07-24 | End: 2019-08-23

## 2019-07-25 RX ORDER — OXYCODONE HYDROCHLORIDE 10 MG/1
10 TABLET ORAL
COMMUNITY
Start: 2019-07-23 | End: 2019-08-02

## 2019-07-25 RX ORDER — TIZANIDINE 4 MG/1
4 TABLET ORAL EVERY 8 HOURS
Qty: 60 TABLET | Refills: 1 | Status: SHIPPED | OUTPATIENT
Start: 2019-07-25 | End: 2019-07-25

## 2019-07-25 RX ORDER — TIZANIDINE 4 MG/1
4 TABLET ORAL EVERY 8 HOURS
Qty: 60 TABLET | Refills: 1 | Status: SHIPPED | OUTPATIENT
Start: 2019-07-25 | End: 2019-07-25 | Stop reason: SDUPTHER

## 2019-07-25 NOTE — PROGRESS NOTES
Office Visit    Patient Name: Sha Triplett    : 1950  MRN: [id number]      Assessment/Plan:  Sha Triplett is a 69 y.o. male who presents today for :    Follow up  Closed fracture of transverse process of thoracic vertebra with routine healing, subsequent encounter  Fall from height of greater than 3 feet  Acute bilateral low back pain without sciatica  Muscle spasm  -     tiZANidine (ZANAFLEX) 4 MG tablet; Take 1 tablet (4 mg total) by mouth every 8 (eight) hours. for 10 days  Dispense: 60 tablet; Refill: 1  -AFVSS, exam unremarkable, doing well post-discharge. -able to ambulate on his own - will have pt f/u with Ortho as outpatient as well as clearance for PT. Continue current medications PRN, add muscle relaxer, -potential side effects associated with medications discussed with patient, which patient voices understanding.  Call clinic back with any concerns      Other constipation - secondary to opioid  -     docusate sodium (COLACE) 100 MG capsule; Take 1 capsule (100 mg total) by mouth 2 (two) times daily as needed for Constipation.  Dispense: 180 capsule; Refill: 2    Closed fracture of multiple ribs of left side with routine healing, subsequent encounter  Contusion of left lung, subsequent encounter  -     X-Ray Chest PA And Lateral; Future; Expected date: 2019  Traumatic pneumothorax, subsequent encounter  -     X-Ray Chest PA And Lateral; Future; Expected date: 2019  -no SOB, AFVSS  -recheck CXR in 1-2 weeks.          Follow up in about 1 week (around 2019).     This note was created by combination of typed  and Dragon dictation.  Transcription errors may be present.  If there are any questions, please contact me.        ----------------------------------------------------------------------------------------------------------------------      HPI:  Patient Care Team:  Jude Kee MD as PCP - General (Family Medicine)  HARRIET Vazquez MD as Consulting Physician  (Urology)  Filiberto Varela, BESSY as Consulting Physician (Optometry)    Sha is a 69 y.o. male with      Patient Active Problem List   Diagnosis    Hypertension    Chronic hepatitis C without hepatic coma    Other symptoms involving digestive system(787.99)    Hematospermia    BPH with obstruction/lower urinary tract symptoms    Nocturia    Prostate cancer    Muscle spasm       Patient presents today for :  Hospital Follow Up    Patient is here today for f/u of recent ED visit for a fall he suffered from four days ago when he fell from about 10ft when he was working on his house - denied LOC nor bleeding - he landed on his L flank area and injured his elbow. He went to the ED for further eval over an hour later - imaging studies were done (and summarized below), which showed rib fractures 7th-10th, with a trace L-side pneumothorax, nondisplaced Fx of T7 L transverse process. He states that he was transferred to [facility] for further care as it was deemed a trauma case for further eval, where he got a repeat CXR showing improved apical pneumothorax - he worked with PT/OT, who taped his ribs and deemed stable for discharge the next day. He denies CP/SOB/coughing/HAINES at this time. His pain is controlled for the most part and he is able to resume most of his daily routine with PRN use of Oxycodone/gabapentin. Pain is usually worse with him getting up and moving around or exerting himself. Summary of of ED visit reviewed and copied below. He does desire to work with PT, which has not been set up yet.        Additional ROS  No F/C/wt changes/fatigue  No dysphagia/sore throat/rhinorrhea  No CP/HAINES/palpitations/swelling  No cough/wheezing/SOB  No nausea/vomiting/abd pain/no diarrhea, +constipation, no blood in stool  No rashes  No MSK weakness/HA/tingling/numbness  No anxiety/depression          Imaging from 7/21/19    CT Thoracic Spine WO    Impression       1. Multiple left posterior displaced and nondisplaced rib  fractures involving the left 7th through 10th ribs, as detailed above.  2. Resultant trace left-sided pneumothorax, small amount of layering left hemothorax, with presumed lower lobe pulmonary contusions and small amount of subcutaneous emphysema involving the left posterolateral chest wall.  Follow-up chest radiograph is recommended to ensure pneumothorax remains stable in size.  3. Acute nondisplaced fracture of the T7 left transverse process.  4. No additional acute thoracic spine or lumbar spine fractures seen.  5. No acute intra-abdominal abnormalities identified.  6. Additional findings as detailed above.  Findings were discussed with Dr. Canales at the time of dictation at 23:40.       XR Elbows L    Impression       There is no evidence of fracture or subluxation.         XR Elbows R  Impression       Posterior soft tissue gas presumably from laceration.    No fracture or effusion or malalignment.         CT Lumbar Spine WO    Impression       1. Multiple left posterior displaced and nondisplaced rib fractures involving the left 7th through 10th ribs, as detailed above.  2. Resultant trace left-sided pneumothorax, small amount of layering left hemothorax, with presumed lower lobe pulmonary contusions and small amount of subcutaneous emphysema involving the left posterolateral chest wall.  Follow-up chest radiograph is recommended to ensure pneumothorax remains stable in size.  3. Acute nondisplaced fracture of the T7 left transverse process.  4. No additional acute thoracic spine or lumbar spine fractures seen.  5. No acute intra-abdominal abnormalities identified.  6. Additional findings as detailed above.  Findings were discussed with Dr. Canales at the time of dictation at 23:40.           CT A/P  Impression       1. Multiple left posterior displaced and nondisplaced rib fractures involving the left 7th through 10th ribs, as detailed above.  2. Resultant trace left-sided pneumothorax, small amount of layering  left hemothorax, with presumed lower lobe pulmonary contusions and small amount of subcutaneous emphysema involving the left posterolateral chest wall.  Follow-up chest radiograph is recommended to ensure pneumothorax remains stable in size.  3. Acute nondisplaced fracture of the T7 left transverse process.  4. No additional acute thoracic spine or lumbar spine fractures seen.  5. No acute intra-abdominal abnormalities identified.  6. Additional findings as detailed above.  Findings were discussed with Dr. Canales at the time of dictation at 23:40.       [facility] CXR 7/22/19  FINDINGS:    Coiled tubing is seen overlying the left cervical region, external.    The cardiac and mediastinal contours appear unchanged.     There is increased left greater than right airspace opacities.. Trace left apical pneumothorax, minimally improved. Left basal effusion. Osseous structures appear unchanged with multiple left rib fractures.       [facility]             Patient Active Problem List   Diagnosis    Hypertension    Chronic hepatitis C without hepatic coma    Other symptoms involving digestive system(777.99)    Hematospermia    BPH with obstruction/lower urinary tract symptoms    Nocturia    Prostate cancer    Muscle spasm       Current Medications  Medications reviewed and updated.       Current Outpatient Medications:     acetaminophen (TYLENOL) 325 MG tablet, Take 650 mg by mouth., Disp: , Rfl:     gabapentin (NEURONTIN) 100 MG capsule, Take 300 mg by mouth., Disp: , Rfl:     lidocaine (LIDODERM) 5 %, Place 1 patch onto the skin., Disp: , Rfl:     oxyCODONE (ROXICODONE) 10 mg Tab immediate release tablet, Take 10 mg by mouth., Disp: , Rfl:     b complex vitamins tablet, Take 1 tablet by mouth once daily., Disp: , Rfl:     cyanocobalamin, vitamin B-12, (VITAMIN B-12) 50 mcg tablet, Take 50 mcg by mouth once daily., Disp: , Rfl:     diphenhydrAMINE (BENADRYL) 50 MG tablet, Take 50 mg by mouth nightly as needed  for Itching., Disp: , Rfl:     docusate sodium (COLACE) 100 MG capsule, Take 1 capsule (100 mg total) by mouth 2 (two) times daily as needed for Constipation., Disp: 180 capsule, Rfl: 2    fish oil-omega-3 fatty acids 300-1,000 mg capsule, Take 2 g by mouth once daily., Disp: , Rfl:     ibuprofen (ADVIL,MOTRIN) 800 MG tablet, Take 1 tablet (800 mg total) by mouth every 6 (six) hours as needed for Pain., Disp: 90 tablet, Rfl: 1    milk thistle 175 mg tablet, Take 175 mg by mouth once daily., Disp: , Rfl:     multivitamin capsule, Take 1 capsule by mouth once daily., Disp: , Rfl:     PSYLLIUM SEED, WITH DEXTROSE, (CILIUM ORAL), Take by mouth., Disp: , Rfl:     tiZANidine (ZANAFLEX) 4 MG tablet, Take 1 tablet (4 mg total) by mouth every 8 (eight) hours., Disp: 90 tablet, Rfl: 1    Current Facility-Administered Medications:     gentamicin injection 80 mg, 80 mg, Intramuscular, 1 time in Clinic/HOD, HARRIET Vazquez MD    Past Surgical History:   Procedure Laterality Date    BIOPSY-LIVER N/A 3/11/2015    Performed [facility]    COLONOSCOPY N/A 3/3/2015    Performed by Abundio Lopez MD at [facility] ENDO    COLONOSCOPY W/ BIOPSIES  3/4/15    [facility].    LIVER BIOPSY  1998    trus/bx 2018         Family History   Problem Relation Age of Onset    Alzheimer's disease Mother     Macular degeneration Father        Social History     Socioeconomic History    Marital status:      Spouse name: Not on file    Number of children: Not on file    Years of education: Not on file    Highest education level: Not on file   Occupational History    Not on file   Social Needs    Financial resource strain: Not on file    Food insecurity:     Worry: Not on file     Inability: Not on file    Transportation needs:     Medical: Not on file     Non-medical: Not on file   Tobacco Use    Smoking status: Former Smoker     Packs/day: 0.00    Smokeless tobacco: Never Used  "  Substance and Sexual Activity    Alcohol use: No    Drug use: No    Sexual activity: Not on file   Lifestyle    Physical activity:     Days per week: Not on file     Minutes per session: Not on file    Stress: Not on file   Relationships    Social connections:     Talks on phone: Not on file     Gets together: Not on file     Attends [employer] service: Not on file     Active member of club or organization: Not on file     Attends meetings of clubs or organizations: Not on file     Relationship status: Not on file   Other Topics Concern    Not on file   Social History Narrative     x 2.  Two bio kids. One adopted.   Retired [employer].  Now a non smoker.  Chris [employer] .             Allergies   Review of patient's allergies indicates:  No Known Allergies          Review of Systems  See HPI      Physical Exam  BP (!) 150/90   Pulse 69   Temp 98.2 °F (36.8 °C) (Oral)   Ht 5' 10" (1.778 m)   Wt 80.7 kg (178 lb)   SpO2 99%   BMI 25.54 kg/m²     GEN: NAD, well developed, pleasant, well nourished  HEENT: NCAT, PERRLA, EOMI, sclera clear, anicteric, O/P clear, MMM with no lesions  NECK: normal, supple with midline trachea, no LAD, no thyromegaly  LUNGS: CTAB, no w/r/r, no increased work of breathing   HEART: RRR, normal S1 and S2, no m/r/g, no edema  ABD: s/nt/nd, NABS  SKIN: normal turgor, no rashes  PSYCH: AOx3, appropriate mood and affect  MSK: warm/well perfused, normal ROM in all extremities, no c/c/e.  moderate brusing in the L flank and a small er area on R lower back, with good ROM and normal alignment of spine.  He has good ROM in the cervical neck, no pain against resistance. 5/5 strength in both UEs and LEs with normal sensation. B/l elbows intact with good ROM, he does have a few stitches in the R elbow. He has normal gait on today's exam. No hip pain with FROM in b/l LEs.          "

## 2019-07-31 ENCOUNTER — LAB VISIT (OUTPATIENT)
Dept: LAB | Facility: OTHER | Age: 69
End: 2019-07-31
Payer: MEDICARE

## 2019-07-31 ENCOUNTER — OFFICE VISIT (OUTPATIENT)
Dept: SPINE | Facility: CLINIC | Age: 69
End: 2019-07-31
Payer: MEDICARE

## 2019-07-31 VITALS
WEIGHT: 182.56 LBS | SYSTOLIC BLOOD PRESSURE: 156 MMHG | HEIGHT: 70 IN | TEMPERATURE: 98 F | BODY MASS INDEX: 26.14 KG/M2 | HEART RATE: 69 BPM | DIASTOLIC BLOOD PRESSURE: 74 MMHG

## 2019-07-31 DIAGNOSIS — E55.9 VITAMIN D DEFICIENCY: ICD-10-CM

## 2019-07-31 DIAGNOSIS — S22.068A OTHER CLOSED FRACTURE OF SEVENTH THORACIC VERTEBRA, INITIAL ENCOUNTER: ICD-10-CM

## 2019-07-31 DIAGNOSIS — S22.42XD MULTIPLE FRACTURES OF RIBS, LEFT SIDE, SUBSEQUENT ENCOUNTER FOR FRACTURE WITH ROUTINE HEALING: ICD-10-CM

## 2019-07-31 DIAGNOSIS — S22.068A OTHER CLOSED FRACTURE OF SEVENTH THORACIC VERTEBRA, INITIAL ENCOUNTER: Primary | ICD-10-CM

## 2019-07-31 LAB — 25(OH)D3+25(OH)D2 SERPL-MCNC: 30 NG/ML (ref 30–96)

## 2019-07-31 PROCEDURE — 3077F PR MOST RECENT SYSTOLIC BLOOD PRESSURE >= 140 MM HG: ICD-10-PCS | Mod: HCNC,CPTII,S$GLB, | Performed by: PHYSICAL MEDICINE & REHABILITATION

## 2019-07-31 PROCEDURE — 3078F PR MOST RECENT DIASTOLIC BLOOD PRESSURE < 80 MM HG: ICD-10-PCS | Mod: HCNC,CPTII,S$GLB, | Performed by: PHYSICAL MEDICINE & REHABILITATION

## 2019-07-31 PROCEDURE — 99999 PR PBB SHADOW E&M-EST. PATIENT-LVL III: CPT | Mod: PBBFAC,HCNC,, | Performed by: PHYSICAL MEDICINE & REHABILITATION

## 2019-07-31 PROCEDURE — 99204 OFFICE O/P NEW MOD 45 MIN: CPT | Mod: HCNC,S$GLB,, | Performed by: PHYSICAL MEDICINE & REHABILITATION

## 2019-07-31 PROCEDURE — 3078F DIAST BP <80 MM HG: CPT | Mod: HCNC,CPTII,S$GLB, | Performed by: PHYSICAL MEDICINE & REHABILITATION

## 2019-07-31 PROCEDURE — 3077F SYST BP >= 140 MM HG: CPT | Mod: HCNC,CPTII,S$GLB, | Performed by: PHYSICAL MEDICINE & REHABILITATION

## 2019-07-31 PROCEDURE — 1101F PT FALLS ASSESS-DOCD LE1/YR: CPT | Mod: HCNC,CPTII,S$GLB, | Performed by: PHYSICAL MEDICINE & REHABILITATION

## 2019-07-31 PROCEDURE — 99204 PR OFFICE/OUTPT VISIT, NEW, LEVL IV, 45-59 MIN: ICD-10-PCS | Mod: HCNC,S$GLB,, | Performed by: PHYSICAL MEDICINE & REHABILITATION

## 2019-07-31 PROCEDURE — 1101F PR PT FALLS ASSESS DOC 0-1 FALLS W/OUT INJ PAST YR: ICD-10-PCS | Mod: HCNC,CPTII,S$GLB, | Performed by: PHYSICAL MEDICINE & REHABILITATION

## 2019-07-31 PROCEDURE — 99999 PR PBB SHADOW E&M-EST. PATIENT-LVL III: ICD-10-PCS | Mod: PBBFAC,HCNC,, | Performed by: PHYSICAL MEDICINE & REHABILITATION

## 2019-07-31 PROCEDURE — 82306 VITAMIN D 25 HYDROXY: CPT | Mod: HCNC

## 2019-07-31 PROCEDURE — 36415 COLL VENOUS BLD VENIPUNCTURE: CPT | Mod: HCNC

## 2019-07-31 NOTE — PROGRESS NOTES
[facility] Initial Visit Note    Chief Complaint: thoracic pain    HPI: Sha Triplett is a 69 y.o. male who presents for evaluation of thoracic pain, referred by Dr. Jude Kee.      He had a fall on 7/21- fall from ladder (10-12 feet, ladder collapsed), presented to the ED and found to have multiple rib fractures, small PTX, and left T7 non displaced transverse process fracture.  He was admitted for observation, and discharged home.   He has been doing great at home.  Walking without issue.  He has been weaning the oxycodone.  Currently pain 0/10.      Pain is located at left ribs mostly  The pain is described as sharp    It is aggravated by moving too much  It is alleviated by meds, rest    (--) numbness/tingling (has CTS baseline, but no changes)  (--) weakness  (--) bowel/bladder incontinence    Therapeutic interventions thus far:  Medications: oxycodone, tylenol, gabapentin, tizanidine  Bracing/Modalities: none  Therapy: none yet  Injections: none  Surgery: none      PMH:   Past Medical History:   Diagnosis Date    Cancer     prostate     Chronic hepatitis     Hepatitis C     Tobacco dependence                  Past Surgical History:   Procedure Laterality Date    BIOPSY-LIVER N/A 3/11/2015    [facility]    COLONOSCOPY N/A 3/3/2015    [facility] at [facility] ENDO    COLONOSCOPY W/ BIOPSIES  3/4/15    [facility].    LIVER BIOPSY  1998    trus/bx 2018         Family Hx:   Family History   Problem Relation Age of Onset    Alzheimer's disease Mother     Macular degeneration Father         Social History:   Social History     Socioeconomic History    Marital status:      Spouse name: Not on file    Number of children: Not on file    Years of education: Not on file    Highest education level: Not on file   Occupational History    Not on file   Social Needs    Financial resource strain: Not on file    Food insecurity:      Worry: Not on file     Inability: Not on file    Transportation needs:     Medical: Not on file     Non-medical: Not on file   Tobacco Use    Smoking status: Former Smoker     Packs/day: 0.00    Smokeless tobacco: Never Used   Substance and Sexual Activity    Alcohol use: No    Drug use: No    Sexual activity: Not on file   Lifestyle    Physical activity:     Days per week: Not on file     Minutes per session: Not on file    Stress: Not on file   Relationships    Social connections:     Talks on phone: Not on file     Gets together: Not on file     Attends Gnosticism service: Not on file     Active member of club or organization: Not on file     Attends meetings of clubs or organizations: Not on file     Relationship status: Not on file   Other Topics Concern    Not on file   Social History Narrative     x 2.  Two bio kids. One adopted.   Retired [employer].  Now a non smoker.  Chris [employer] .         Allergies: Review of patient's allergies indicates:  No Known Allergies    Current Meds:   Current Outpatient Medications   Medication Sig Dispense Refill    acetaminophen (TYLENOL) 325 MG tablet Take 650 mg by mouth.      b complex vitamins tablet Take 1 tablet by mouth once daily.      cyanocobalamin, vitamin B-12, (VITAMIN B-12) 50 mcg tablet Take 50 mcg by mouth once daily.      diphenhydrAMINE (BENADRYL) 50 MG tablet Take 50 mg by mouth nightly as needed for Itching.      docusate sodium (COLACE) 100 MG capsule Take 1 capsule (100 mg total) by mouth 2 (two) times daily as needed for Constipation. 180 capsule 2    fish oil-omega-3 fatty acids 300-1,000 mg capsule Take 2 g by mouth once daily.      gabapentin (NEURONTIN) 100 MG capsule Take 300 mg by mouth.      ibuprofen (ADVIL,MOTRIN) 800 MG tablet Take 1 tablet (800 mg total) by mouth every 6 (six) hours as needed for Pain. 90 tablet 1    lidocaine (LIDODERM) 5 % Place 1 patch onto the skin.      milk thistle 175 mg tablet Take 175 mg by  "mouth once daily.      multivitamin capsule Take 1 capsule by mouth once daily.      oxyCODONE (ROXICODONE) 10 mg Tab immediate release tablet Take 10 mg by mouth.      PSYLLIUM SEED, WITH DEXTROSE, (CILIUM ORAL) Take by mouth.      tiZANidine (ZANAFLEX) 4 MG tablet Take 1 tablet (4 mg total) by mouth every 8 (eight) hours. 90 tablet 1     Current Facility-Administered Medications   Medication Dose Route Frequency Provider Last Rate Last Dose    gentamicin injection 80 mg  80 mg Intramuscular 1 time in Clinic/HOD HARRIET Vazquez MD           Review of Systems:  11 Point ROS (constitutional,HEENT,Resp,CV,GI,,Skin,Endo/Heme/Allergy,Psych,Neuro,MSK) negative aside from what is mentioned in HPI above.      Physical Exam:  BP (!) 156/74   Pulse 69   Temp 98.1 °F (36.7 °C)   Ht 5' 10" (1.778 m)   Wt 82.8 kg (182 lb 8.7 oz)   BMI 26.19 kg/m²   General/Constitutional: Awake, in no acute distress  Psych: Normal affect  CV: Warm, well perfused extremities  Resp: Normal rate, unlabored breathing  Skin: No erythema or rash on exposed skin  Lymph: No lympedema  Thoracic spine exam:  Inspection: Left flank and mid spine bruising.    Palpation: There was not tenderness to palpation  ROM: ROM with thoracic rotation was Slightly decreased.  There was mild pain with ROM (thoracic rotation).  Minimal pain with deep breathing as well  Neuro exam:  DTRs: normal and symmetric in the BLE  Motor: Motor strength is 5/5 in the bilateral shoulder abductors, elbow flexors, elbow extensors, wrist extensors, and hand              Motor exam showed 5/5 strength in the bilateral hip flexors, knee extensors, ankle dorsiflexors, ankle plantarflexors, and toe extensors.    Sensation:  Sensation to light touch is normal in all dermatomes and peripheral nerve distributions to the bilateral lower extremities  Bain: (--)  Gait: normal      Imaging: I personally reviewed imaging today  CT T spine (7/21/19):   1. Multiple left " posterior displaced and nondisplaced rib fractures involving the left 7th through 10th ribs, as detailed above.  2. Resultant trace left-sided pneumothorax, small amount of layering left hemothorax, with presumed lower lobe pulmonary contusions and small amount of subcutaneous emphysema involving the left posterolateral chest wall.  Follow-up chest radiograph is recommended to ensure pneumothorax remains stable in size.  3. Acute nondisplaced fracture of the T7 left transverse process.  4. No additional acute thoracic spine or lumbar spine fractures seen.  5. No acute intra-abdominal abnormalities identified.    Impression: Sha Triplett is a 69 y.o. male who presents with: Left T7 transverse process fracture and multiple left rib fractures s/p fall from ladder.  Pain very limited currently and functionally doing great.      Plan:   - PT already ordered  - Will check Vit D level- supplement if low.    - Discussed no ladders at least until fractures are healed  - Ok to continue with current meds: tylenol, zanaflex.  Has only needed 1 oxycodone in the past several days  - Practice deep breathing to prevent atelectasis s/p rib fractures  - Follow up PRN    Felipe Mcdermott, DO  Physical Medicine and Rehabilitation

## 2019-07-31 NOTE — LETTER
July 31, 2019      Jude Kee MD  [address]  [place] [address]           [address]  [facility], [address]  [facility] [address]  Phone: [phone]  Fax: [phone]          Patient: Sha Triplett   MR Number: [id number]   YOB: 1950   Date of Visit: 7/31/2019       Dear Dr. Jude Kee:    Thank you for referring Sha Triplett to me for evaluation. Attached you will find relevant portions of my assessment and plan of care.    If you have questions, please do not hesitate to call me. I look forward to following Sha Triplett along with you.    Sincerely,    Felipe Mcdermott, DO    Enclosure  CC:  No Recipients    If you would like to receive this communication electronically, please contact [email or web address] or [phone] to request more information on [employer] access.    For providers and/or their staff who would like to refer a patient to [facility], please contact us through our one-stop-shop provider referral line, [facility], at [phone].    If you feel you have received this communication in error or would no longer like to receive these types of communications, please e-mail [email or web address]

## 2019-08-01 ENCOUNTER — TELEPHONE (OUTPATIENT)
Dept: PHYSICAL MEDICINE AND REHAB | Facility: CLINIC | Age: 69
End: 2019-08-01

## 2019-08-01 RX ORDER — ERGOCALCIFEROL (VITAMIN D2) 200 MCG/ML
2000 DROPS ORAL DAILY
Qty: 60 ML | Refills: 3 | Status: SHIPPED | OUTPATIENT
Start: 2019-08-01 | End: 2021-04-22

## 2019-08-01 NOTE — TELEPHONE ENCOUNTER
Called pt, went over results with him.  Verified pharmacy.  Went over some nutrition with the pt.  Pt says he will see his PCP tomorrow and will let him know and see about getting some more information from them.    ----- Message from Feliep Mcdermott DO sent at 8/1/2019  9:14 AM CDT -----  Please call patient and let him know his Vitamin D was on the very low end of normal.  I would like him to supplement with daily vitamin D and will send in to his pharmacy.  His should follow up with his primary care physician to repeat the Vitamin D level in 6-12 months. Thanks

## 2019-08-02 ENCOUNTER — OFFICE VISIT (OUTPATIENT)
Dept: FAMILY MEDICINE | Facility: CLINIC | Age: 69
End: 2019-08-02
Payer: MEDICARE

## 2019-08-02 ENCOUNTER — TELEPHONE (OUTPATIENT)
Dept: PHYSICAL MEDICINE AND REHAB | Facility: CLINIC | Age: 69
End: 2019-08-02

## 2019-08-02 VITALS
OXYGEN SATURATION: 99 % | DIASTOLIC BLOOD PRESSURE: 81 MMHG | WEIGHT: 179.81 LBS | BODY MASS INDEX: 25.74 KG/M2 | HEIGHT: 70 IN | HEART RATE: 80 BPM | SYSTOLIC BLOOD PRESSURE: 134 MMHG | TEMPERATURE: 98 F

## 2019-08-02 DIAGNOSIS — M62.838 MUSCLE SPASM: ICD-10-CM

## 2019-08-02 DIAGNOSIS — S22.009D CLOSED FRACTURE OF TRANSVERSE PROCESS OF THORACIC VERTEBRA WITH ROUTINE HEALING, SUBSEQUENT ENCOUNTER: ICD-10-CM

## 2019-08-02 DIAGNOSIS — M54.50 ACUTE BILATERAL LOW BACK PAIN WITHOUT SCIATICA: ICD-10-CM
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
## 2019-08-02 DIAGNOSIS — Z09 FOLLOW UP: ICD-10-CM

## 2019-08-02 DIAGNOSIS — Z48.02 VISIT FOR SUTURE REMOVAL: Primary | ICD-10-CM

## 2019-08-02 PROCEDURE — 3079F PR MOST RECENT DIASTOLIC BLOOD PRESSURE 80-89 MM HG: ICD-10-PCS | Mod: HCNC,CPTII,S$GLB, | Performed by: FAMILY MEDICINE

## 2019-08-02 PROCEDURE — 99213 OFFICE O/P EST LOW 20 MIN: CPT | Mod: HCNC,S$GLB,, | Performed by: FAMILY MEDICINE

## 2019-08-02 PROCEDURE — 1101F PT FALLS ASSESS-DOCD LE1/YR: CPT | Mod: HCNC,CPTII,S$GLB, | Performed by: FAMILY MEDICINE

## 2019-08-02 PROCEDURE — 99999 PR PBB SHADOW E&M-EST. PATIENT-LVL III: CPT | Mod: PBBFAC,HCNC,, | Performed by: FAMILY MEDICINE

## 2019-08-02 PROCEDURE — 1101F PR PT FALLS ASSESS DOC 0-1 FALLS W/OUT INJ PAST YR: ICD-10-PCS | Mod: HCNC,CPTII,S$GLB, | Performed by: FAMILY MEDICINE

## 2019-08-02 PROCEDURE — 99999 PR PBB SHADOW E&M-EST. PATIENT-LVL III: ICD-10-PCS | Mod: PBBFAC,HCNC,, | Performed by: FAMILY MEDICINE

## 2019-08-02 PROCEDURE — 3075F SYST BP GE 130 - 139MM HG: CPT | Mod: HCNC,CPTII,S$GLB, | Performed by: FAMILY MEDICINE

## 2019-08-02 PROCEDURE — 3075F PR MOST RECENT SYSTOLIC BLOOD PRESS GE 130-139MM HG: ICD-10-PCS | Mod: HCNC,CPTII,S$GLB, | Performed by: FAMILY MEDICINE

## 2019-08-02 PROCEDURE — 99213 PR OFFICE/OUTPT VISIT, EST, LEVL III, 20-29 MIN: ICD-10-PCS | Mod: HCNC,S$GLB,, | Performed by: FAMILY MEDICINE

## 2019-08-02 PROCEDURE — 3079F DIAST BP 80-89 MM HG: CPT | Mod: HCNC,CPTII,S$GLB, | Performed by: FAMILY MEDICINE

## 2019-08-02 NOTE — TELEPHONE ENCOUNTER
Vitamin D that was Rx'd was very expensive (>$500).  I called Lobito Uziel and advised him to just take over the counter Vitamin D at 2000U per day.  All questions answered.

## 2019-08-02 NOTE — PROGRESS NOTES
Office Visit    Patient Name: Sha Triplett    : 1950  MRN: [id number]      Assessment/Plan:  Sha Triplett is a 69 y.o. male who presents today for :    Visit for suture removal  Follow up  -sutures removed, wound healing well.  -pt tolerable procedure well, laceration well healed  -no active bleeding, no signs of infection  -pain resolved  -proper wound care and activity modification discussed to decrease chance of re-injuring wound  -f/u as needed    Closed fracture of transverse process of thoracic vertebra with routine healing, subsequent encounter  Fall from height of greater than 3 feet  Acute bilateral low back pain without sciatica  Muscle spasm  -pain improved with PRN Tylenol and mm relaxer, dc Opioid,   -continue PT, f/u PMR as needed        Follow up in about 3 months (around 2019).     This note was created by combination of typed  and Dragon dictation.  Transcription errors may be present.  If there are any questions, please contact me.        ----------------------------------------------------------------------------------------------------------------------      HPI:  Patient Care Team:  Jude Kee MD as PCP - General (Family Medicine)  HARRIET Vazquez MD as Consulting Physician (Urology)  Filiberto Varela OD as Consulting Physician (Optometry)    Sha is a 69 y.o. male with      Patient Active Problem List   Diagnosis    Hypertension    Chronic hepatitis C without hepatic coma    Other symptoms involving digestive system(167.99)    Hematospermia    BPH with obstruction/lower urinary tract symptoms    Nocturia    Prostate cancer    Muscle spasm       Patient presents today for :  Follow-up and Suture / Staple Removal  from laceration to the R elbow. He has been doing well since last visit with me, saw PMR in the interim, no new therapy added to treatment plan. Pain is well controlled on PRN Tylenol and mm Zanalfex, he had stopped taking Oxycodone bc it  makes him high. No falls. No new back pain nor sx, still has mild pain in ribs with occasional coughing.         Additional ROS    No F/C/wt changes/fatigue  No dysphagia/sore throat/rhinorrhea  No CP/HAINES/palpitations/swelling  No cough/wheezing/SOB  No nausea/vomiting/abd pain  No rashes  No MSK weakness/HA/tingling/numbness              Patient Active Problem List   Diagnosis    Hypertension    Chronic hepatitis C without hepatic coma    Other symptoms involving digestive system(817.99)    Hematospermia    BPH with obstruction/lower urinary tract symptoms    Nocturia    Prostate cancer    Muscle spasm       Current Medications  Medications reviewed and updated.       Current Outpatient Medications:     acetaminophen (TYLENOL) 325 MG tablet, Take 650 mg by mouth., Disp: , Rfl:     b complex vitamins tablet, Take 1 tablet by mouth once daily., Disp: , Rfl:     cyanocobalamin, vitamin B-12, (VITAMIN B-12) 50 mcg tablet, Take 50 mcg by mouth once daily., Disp: , Rfl:     diphenhydrAMINE (BENADRYL) 50 MG tablet, Take 50 mg by mouth nightly as needed for Itching., Disp: , Rfl:     docusate sodium (COLACE) 100 MG capsule, Take 1 capsule (100 mg total) by mouth 2 (two) times daily as needed for Constipation., Disp: 180 capsule, Rfl: 2    ergocalciferol (DRISDOL) 8,000 unit/mL Drop, Take 0.25 mLs (2,000 Units total) by mouth once daily., Disp: 60 mL, Rfl: 3    fish oil-omega-3 fatty acids 300-1,000 mg capsule, Take 2 g by mouth once daily., Disp: , Rfl:     gabapentin (NEURONTIN) 100 MG capsule, Take 300 mg by mouth., Disp: , Rfl:     ibuprofen (ADVIL,MOTRIN) 800 MG tablet, Take 1 tablet (800 mg total) by mouth every 6 (six) hours as needed for Pain., Disp: 90 tablet, Rfl: 1    lidocaine (LIDODERM) 5 %, Place 1 patch onto the skin., Disp: , Rfl:     milk thistle 175 mg tablet, Take 175 mg by mouth once daily., Disp: , Rfl:     multivitamin capsule, Take 1 capsule by mouth once daily., Disp: , Rfl:      PSYLLIUM SEED, WITH DEXTROSE, (CILIUM ORAL), Take by mouth., Disp: , Rfl:     tiZANidine (ZANAFLEX) 4 MG tablet, Take 1 tablet (4 mg total) by mouth every 8 (eight) hours., Disp: 90 tablet, Rfl: 1    Current Facility-Administered Medications:     gentamicin injection 80 mg, 80 mg, Intramuscular, 1 time in Clinic/HARRIET LOZA MD    Past Surgical History:   Procedure Laterality Date    BIOPSY-LIVER N/A 3/11/2015    [facility]    COLONOSCOPY N/A 3/3/2015    [facility]    COLONOSCOPY W/ BIOPSIES  3/4/15    / Dr[facility] Ellis at [facility].    LIVER BIOPSY  1998    trus/bx 2018         Family History   Problem Relation Age of Onset    Alzheimer's disease Mother     Macular degeneration Father        Social History     Socioeconomic History    Marital status:      Spouse name: Not on file    Number of children: Not on file    Years of education: Not on file    Highest education level: Not on file   Occupational History    Not on file   Social Needs    Financial resource strain: Not on file    Food insecurity:     Worry: Not on file     Inability: Not on file    Transportation needs:     Medical: Not on file     Non-medical: Not on file   Tobacco Use    Smoking status: Former Smoker     Packs/day: 0.00    Smokeless tobacco: Never Used   Substance and Sexual Activity    Alcohol use: No    Drug use: No    Sexual activity: Not on file   Lifestyle    Physical activity:     Days per week: Not on file     Minutes per session: Not on file    Stress: Not on file   Relationships    Social connections:     Talks on phone: Not on file     Gets together: Not on file     Attends Pentecostalism service: Not on file     Active member of club or organization: Not on file     Attends meetings of clubs or organizations: Not on file     Relationship status: Not on file   Other Topics Concern    Not on file   Social History Narrative     " x 2.  Two bio kids. One adopted.   Retired [employer].  Now a non smoker.  Chris Nam .             Allergies   Review of patient's allergies indicates:  No Known Allergies          Review of Systems  See HPI      Physical Exam  /81   Pulse 80   Temp 98.2 °F (36.8 °C) (Oral)   Ht 5' 10" (1.778 m)   Wt 81.5 kg (179 lb 12.6 oz)   SpO2 99%   BMI 25.80 kg/m²     GEN: NAD, well developed, pleasant, well nourished  HEENT: NCAT, PERRLA, EOMI, sclera clear, anicteric, O/P clear, MMM with no lesions  NECK: normal, supple with midline trachea, no LAD, no thyromegaly  LUNGS: CTAB, no w/r/r, no increased work of breathing   HEART: RRR, normal S1 and S2, no m/r/g, no edema  ABD: s/nt/nd, NABS  SKIN: normal turgor, no rashes, 5 stiches removed from R elbow with scabbing present - no drainage/swelling/redness - wound healing well  PSYCH: AOx3, appropriate mood and affect  MSK: warm/well perfused, normal ROM in all extremities, no c/c/e.        "

## 2019-08-08 ENCOUNTER — CLINICAL SUPPORT (OUTPATIENT)
Dept: REHABILITATION | Facility: HOSPITAL | Age: 69
End: 2019-08-08
Attending: FAMILY MEDICINE
Payer: MEDICARE

## 2019-08-08 DIAGNOSIS — R29.898 DECREASED STRENGTH OF TRUNK AND BACK: ICD-10-CM

## 2019-08-08 DIAGNOSIS — M54.6 THORACIC SPINE PAIN: ICD-10-CM

## 2019-08-08 DIAGNOSIS — M53.84 DECREASED ROM OF INTERVERTEBRAL DISCS OF THORACIC SPINE: ICD-10-CM

## 2019-08-08 PROCEDURE — G8978 MOBILITY CURRENT STATUS: HCPCS | Mod: CJ,HCNC,PN

## 2019-08-08 PROCEDURE — 97161 PT EVAL LOW COMPLEX 20 MIN: CPT | Mod: HCNC,PN

## 2019-08-08 PROCEDURE — G8979 MOBILITY GOAL STATUS: HCPCS | Mod: CJ,HCNC,PN

## 2019-08-08 PROCEDURE — 97110 THERAPEUTIC EXERCISES: CPT | Mod: HCNC,PN

## 2019-08-08 NOTE — PROGRESS NOTES
Physical Therapy Initial Evaluation     Name: Sha Triplett  Clinic Number: [id number]    Therapy Diagnosis: No diagnosis found.  Physician: Jude Kee MD    Physician Orders: {AMB PT KNEE ORDERS:36066} ***  Medical Diagnosis from Referral:   S22.009D (ICD-10-CM) - Closed fracture of transverse process of thoracic vertebra with routine healing, subsequent encounter   W17.89XA (ICD-10-CM) - Fall from height of greater than 3 feet       Evaluation Date: 8/8/2019  Authorization Period Expiration: ***  Plan of Care Expiration: ***  Visit # / Visits authorized: ***/ ***    Time In: ***  Time Out: ***  Total Billable Time: *** minutes    Precautions: {IP WOUND PRECAUTIONS OHS:44171}    Subjective        Medical History:   Past Medical History:   Diagnosis Date    Cancer     prostate     Chronic hepatitis     Hepatitis C     Tobacco dependence        Surgical History:   Sha Triplett  has a past surgical history that includes Liver biopsy (1998); Colonoscopy w/ biopsies (3/4/15); and trus/bx 2018.    Medications:   Sha has a current medication list which includes the following prescription(s): b complex vitamins, cyanocobalamin (vitamin b-12), diphenhydramine, docusate sodium, ergocalciferol, fish oil-omega-3 fatty acids, gabapentin, ibuprofen, lidocaine, milk thistle, multivitamin, psyllium seed (with dextrose), and tizanidine, and the following Facility-Administered Medications: gentamicin.    Allergies:   Review of patient's allergies indicates:  No Known Allergies     Date of onset: ***  History of current condition - Sha reports:     rib fractures 7th-10th, with a trace L-side pneumothorax, nondisplaced Fx of T7 L transverse process. He states that he was transferred to [facility] for further care as it was deemed a trauma case for further eval, where he got a repeat CXR showing improved apical pneumothorax - he worked with PT/OT, who taped his ribs and  "deemed stable for discharge the next day.    Imaging, {Mri/ctscan/bone scan:09504}: ***    Prior Therapy: ***  Social History: *** {LIVES WITH:66783}  Occupation: ***  Prior Level of Function: ***  Bowel/Bladder Change:  DME owned/used: ***  Current Level of Function: ***    Pain:  Current {0-10:20507::"0"}/10, worst {0-10:20507::"0"}/10, best {0-10:20507::"0"}/10   Location: {RIGHT LEFT BILATERAL:25526} {LOCATION ON BODY:61440}  Description: {Pain Description:77767}  Aggravating Factors: {Causes; Pain:83607}  Easing Factors: {Pain (activities that relieve):02319}    Pts goals: ***    Objective     Posture Alignment: {AMB PT VESTIBULAR POSTURE ALIGNMENT:83622}  Palpation: ***    LUMBAR SPINE AROM:   Flexion: ***   Extension: ***   Left Sidebend: ***   Right Sidebend: ***   Left Rotation: ***   Right Rotation: ***     SEGMENTAL MOBILITY: ***    TRUNK STRENGTH:  Flexion {AMB PT VESTIBULAR STRENGTH:80001}   Extension {AMB PT VESTIBULAR STRENGTH:48245}   Left Rotation {AMB PT VESTIBULAR STRENGTH:12613}   Right Rotation {AMB PT VESTIBULAR STRENGTH:08100}         LOWER EXTREMITY STRENGTH:   Left Right   Quadriceps {AMB PT VESTIBULAR STRENGTH:03161} {AMB PT VESTIBULAR STRENGTH:43085}   Hamstrings {AMB PT VESTIBULAR STRENGTH:85399} {AMB PT VESTIBULAR STRENGTH:89577}     Iliopsoas {AMB PT VESTIBULAR STRENGTH:33428} {AMB PT VESTIBULAR STRENGTH:31888}   Glute Med {AMB PT VESTIBULAR STRENGTH:21299} {AMB PT VESTIBULAR STRENGTH:79851}   Hip IR {AMB PT VESTIBULAR STRENGTH:33627} {AMB PT VESTIBULAR STRENGTH:39618}   Hip ER {AMB PT VESTIBULAR STRENGTH:03124} {AMB PT VESTIBULAR STRENGTH:21721}   Hip Ext {AMB PT VESTIBULAR STRENGTH:99995} {AMB PT VESTIBULAR STRENGTH:97581}   Ankle DF {AMB PT VESTIBULAR STRENGTH:45142} {AMB PT VESTIBULAR STRENGTH:35960}   Ankle PF {AMB PT VESTIBULAR STRENGTH:89596} {AMB PT VESTIBULAR STRENGTH:46131}       DTR's:   Left Right   Patella Tendon {AMB PT KNEE DTR'S:04872} {AMB PT KNEE DTR'S:25072} "   Achilles Tendon {AMB PT KNEE DTR'S:90496} {AMB PT KNEE DTR'S:04823}     Dermatomes: Sensation: Light Touch: {INTACT:03638}  Saddle Sensation: ***  Myotomes: ***  Flexibility:***    Special Tests:   Left Right   Slump *** ***   SLR *** ***   Crossed SLR *** ***   Sacral Thrust *** ***   EILEEN *** ***   Quadrant Test *** ***   Prone Instability Test *** ***     GAIT: Sha ambulates with {AMB PT VESTIBULAR ASSISTIVE:25535} with {AMB PT VESTIBULAR SUPERVISION:16306}.     GAIT DEVIATIONS: Sha displays {AMB PT VESTIBULAR GAIT DEVIATIONS:20758}    Pt/family was provided educational information, including: role of PT, goals for PT, scheduling - pt verbalized understanding. Discussed insurance limitations with pt.       Functional Limitations Reports - G Codes  Category: Mobility  Tool: FOTO Lumbar Survey  Score: ***% Limitation   Modifier  Impairment Limitation Restriction    CH  0 % impaired, limited or restricted    CI  @ least 1% but less than 20% impaired, limited or restricted    CJ  @ least 20%<40% impaired, limited or restricted    CK  @ least 40%<60% impaired, limited or restricted    CL  @ least 60% <80% impaired, limited or restricted    CM  @ least 80%<100% impaired limited or restricted    CN  100% impaired, limited or restricted     Current/: *** = ***% Limitation   Goal/ : *** = ***% Limitation    TREATMENT     Treatment Time In: ***  Treatment Time Out: ***  Total Treatment time separate from Evaluation: *** minutes    Sha received therapeutic exercises to develop {AMB PT PROGRESS OBJECTIVE:47991} for *** minutes including:  ***    Sha received the following manual therapy techniques: {AMB PT PROGRESS MANUAL THERAPY:12836} were applied to the: *** for *** minutes, including:  ***    Sha participated in neuromuscular re-education activities to improve: {AMB PT PROGRESS NEURO RE-ED:60646} for *** minutes. The following activities were included:  ***      Home Exercises and Patient  "Education Provided    Education provided:   - ***    Written Home Exercises Provided: {Julia single:83049::"yes","Patient instructed to cont prior HEP"}.  Exercises were reviewed and Sha was able to demonstrate them prior to the end of the session.  Sha demonstrated {Desc; good/fair/poor:79073} understanding of the education provided.     See EMR under {Blank single:24130::"Media","Patient Instructions"} for exercises provided {Blank single:14518::"8/8/2019","prior visit"}.    Assessment     Sha is a 69 y.o. male referred to outpatient Physical Therapy with a medical diagnosis of ***. with signs and symptoms including: increased low back pain, decreased lumbar ROM, decreased LE Strength, soft tissue dysfunction, postural imbalance,impaired joint mobility, and decreased tolerance to functional activities.      Pt with good motivation to perform physical activity and responds well to cueing.      Pt prognosis is {REHAB PROGNOSIS OHS:73497}.   Pt will benefit from skilled outpatient Physical Therapy to address the deficits stated above and in the chart below, provide pt/family education, and to maximize pt's level of independence.     Plan of care discussed with patient: {YES:94822}  Pt's spiritual, cultural and educational needs considered and patient is agreeable to the plan of care and goals as stated below:     Anticipated Barriers for therapy: ***    Medical Necessity is demonstrated by the following  History  Co-morbidities and personal factors that may impact the plan of care Co-morbidities:   {Co-morbidities:80218}    Personal Factors:   {Personal Factors:28887}     {Desc; low/moderate/high:056855}   Examination  Body Structures and Functions, activity limitations and participation restrictions that may impact the plan of care Body Regions:   {Body Regions:76811}    Body Systems:    {Body Systems:27746}    Participation Restrictions:   ***    Activity limitations:   Learning and applying " "knowledge  {Learning and applying knowledge:99119}    General Tasks and Commands  {Gen tasks and commands:91755}    Communication  {Communication:91714}    Mobility  {Mobility:38074}    Self care  {Self Care:77211}    Domestic Life  {Domestic Life:83792}    Interactions/Relationships  No deficits    Life Areas  No deficits    Community and Social Life  No deficits         {Desc; low/moderate/high:113737}   Clinical Presentation {Clinical Presentation :64838} {Desc; low/moderate/high:662490}   Decision Making/ Complexity Score: {Desc; low/moderate/high:134241}     Pt's spiritual, cultural and educational needs considered and pt agreeable to plan of care and goals as stated below:       Short Term GOALS: *** weeks. Pt agrees with goals set.  1. Patient demonstrates independence with HEP.   2. Patient demonstrates independence with Postural Awareness.   3. Patient demonstrates independence with body mechanics.   4. Patient will report pain of ***/10 at worst, on 0-10 pain scale, with all activity  5. Patient demonstrates increased *** to *** to improve tolerance to functional activities pain free.   6. Patient demonstrates increased strength BLE's to ***/5 or greater to improve tolerance to functional activities pain free.    Long Term GOALS: *** weeks. Pt agrees with goals set.  1. Patient demonstrates increased *** to *** to improve tolerance to functional activities pain free.   2. Patient demonstrates increased strength BLE's to ***/5 or greater to improve tolerance to functional activities pain free.   3. Patient demonstrates improved overall function per FOTO Lumbar Survey to ***% Limitation or less.   4. Patient will report pain of ***/10 at worst, on 0-10 pain scale, with all activity  5. Patient demonstrates ability to walk      PLAN       Plan of care Certification: 8/8/2019 to ***.    Outpatient Physical Therapy {NUMBERS 1-5:32859} times weekly for {0-10:38032::"0"} weeks to include the following " interventions: {TX PLAN:47186}.  Pt may be seen by PTA as part of the rehabilitation team.     Kevin Kee, PT  8/8/2019    I have seen the patient, reviewed the therapist's plan of care, and I agree with the plan of care.      I certify the need for these services furnished under this plan of treatment and while under my care.     ___________________ ________ Physician/Referring Practitioner            ___________________________ Date of Signature

## 2019-08-09 NOTE — PLAN OF CARE
[facility]  Physical Therapy Initial Evaluation    Name: Sha Triplett  Clinic Number: [id number]    Therapy Diagnosis:   Encounter Diagnoses   Name Primary?    Decreased ROM of intervertebral discs of thoracic spine     Thoracic spine pain     Decreased strength of trunk and back      Physician: Jude Kee MD    Physician Orders: PT Eval and Treat   Medical Diagnosis from Referral:   S22.009D (ICD-10-CM) - Closed fracture of transverse process of thoracic vertebra with routine healing, subsequent encounter   W17.89XA (ICD-10-CM) - Fall from height of greater than 3 feet   Evaluation Date: 8/8/2019  Authorization Period Expiration: 12/31/2019  Plan of Care Expiration: 11/8/2019  Visit # / Visits authorized: 1/20    Time In: 9:12a  Time Out: 9:59a  Total Billable Time: 47 minutes    Precautions: Standard    Subjective   Date of onset: 3 weeks this Sunday  History of current condition - Sha reports: he was cutting a tree down at his brother's house and was climbing a ladder and didn't check his footing and climbed up a ladder and fell down. He thinks what happened is he hit the ladder on his L side and landed on his elbows. He reports after the injury he couldn't breathe due to a punctured lung, and he was in a lot of pain, but he took a shower and drove himself to the ER. He states that they took imaging in the ER and he has (7-10) 4 fractured ribs, rib #7 was the one that punctured lung. He reports he is not taking pain pills anymore because he doesn't want to become addicted, but he will take tyelonol sometimes. When in the hospital, he stayed in the hospital from Sunday to Tuesday and had some PT. He reports continued pain with sneezing, coughing, breathing too heavy. He states he has been able to sleep in the chair. At first left hand he had pain and tingling but it is gone now and denies numbness and tingling.      Medical History:   Past Medical History:   Diagnosis  Date    Cancer     prostate     Chronic hepatitis     Hepatitis C     Thoracic spine pain 8/8/2019    Tobacco dependence        Surgical History:   Sha Triplett  has a past surgical history that includes Liver biopsy (1998); Colonoscopy w/ biopsies (3/4/15); and trus/bx 2018.    Medications:   Sha has a current medication list which includes the following prescription(s): b complex vitamins, cyanocobalamin (vitamin b-12), diphenhydramine, docusate sodium, ergocalciferol, fish oil-omega-3 fatty acids, gabapentin, ibuprofen, lidocaine, milk thistle, multivitamin, psyllium seed (with dextrose), and tizanidine, and the following Facility-Administered Medications: gentamicin.    Allergies:   Review of patient's allergies indicates:  No Known Allergies     Imaging, CT scan films: 7/21/19  FINDINGS:  Acute displaced fractures are seen involving the posterolateral left 7th, 8th, and 9th ribs.  Additional acute nondisplaced fractures are seen involving the left posteromedial 7th through 10th ribs near the costovertebral junctions.  There is also a nondisplaced acute fracture of the T7 left transverse process.  No additional thoracic spine fractures are seen.  Thoracic spine alignment is within normal limits with no evidence of subluxation.  No right-sided rib fractures are identified.  Resultant trace left-sided pneumothorax is seen with small amount of layering fluid, suspected blood products.  Dependent opacification is seen involving the bilateral lower lobes, much more pronounced on the left as compared to the right likely reflecting pulmonary contusion.  There is subcutaneous emphysema seen involving the left posterior lateral wall.    Red Flags:   B&B Changes: none  Sleep dysfunction: occasional   Cough&sneezing pain: yes  No pain change with positional movements: no  Prior Therapy: not for this   Social History: stairs, lives alone  Occupation: retired - works on houses but nothing formally   Prior Level  of Function: independent   Current Level of Function: independent     Pain:  Current 1/10, worst 10/10, best 0/10   Location: left trunk  Description: Sharp, like a knife  Aggravating Factors: Laying and Coughing/Sneezing, moving quickly   Easing Factors: heating pad and rest    Pts goals: leave without pain     Objective     Observation: pt has increased thoracic kyphosis, rounded shoulders and forward head posture - bruising over L lower back and flank     Thoracic Range of Motion:    Degrees Pain   Flexion WFL   discomfort        Extension Moderate limitation   p!        Left Side Bending Moderate limitation p!   Right Side Bending WFL p!        Left rotation   Mod limitation p!   Right Rotation   WFL discomfort      Lumbar Range of Motion:    Degrees Pain   Flexion WFL   discomfort   Extension WFL  p!   Left Side Bending Minimal limitation discomfort   Right Side Bending WFL discomfort        Left rotation   WFL discomfort   Right Rotation   50% p!       Cervical Range of Motion: All direction are WFL and without pain     Shoulder Range of Motion: all directions are WFL and without pain     Upper Extremity Strength  (R) UE  (L) UE    Shoulder flexion: 4+/5 Shoulder flexion: 4+/5   Shoulder Abduction: 4/5 Shoulder abduction: 4-/5 p!   Shoulder ER 4+/5 Shoulder ER 4+/5   Shoulder IR 4+/5 Shoulder IR 4+/5   Elbow flexion: 4+/5 Elbow flexion: 4+/5   Elbow extension: 4/5 Elbow extension: 4-/5   Wrist flexion: 4/5 Wrist flexion: 4/5   Wrist extension: 4+/5 Wrist extension: 4+/5   Lower Trap 3+/5 Lower Trap 3+/5   Middle Trap 3+/5 Middle Trap 3+/5   Rhomboids 3+/5 Rhomboids 3+/5       Lower Extremity Strength  Right LE  Left LE    Knee extension: 4+/5 p! Knee extension: 4+/5   Knee flexion: 4+/5 Knee flexion: 4+/5   Hip flexion: 4/5 Hip flexion: 4/5 p!   Hip extension:  3+/5 Hip extension: 3+/5   Hip abduction: Unable to get into position Hip abduction: Unable to get into position   Hip ER 4/5 Hip ER 4/5 p!   Hip IR  4/5 Hip IR 4/5   Hip adduction: Unable to get into position Hip adduction Unable to get into position   Ankle dorsiflexion: 4+/5 Ankle dorsiflexion: 4+/5   Ankle plantarflexion: 4/5 Ankle plantarflexion: 4/5       Joint Mobility: not assessed due to fracture     Palpation: ttp over ribs 7-10     Sensation: intact to light touch       CMS Impairment/Limitation/Restriction for FOTO Lumbar Survey    Therapist reviewed FOTO scores for Sha Triplett on 8/8/2019.   FOTO documents entered into EPIC - see Media section.    Limitation Score: 35%  Category: Other    Current : CJ = at least 20% but < 40% impaired, limited or restricted  Goal: CJ = at least 20% but < 40% impaired, limited or restricted       TREATMENT   Treatment Time In: 9:40a  Treatment Time Out: 9:59a  Total Treatment time separate from Evaluation: 19 minutes    Sha received therapeutic exercises to develop strength, endurance, ROM, flexibility, posture and core stabilization for 19 minutes including:    Scapular retractions x15, 3 sec hold  Thoracic mobility all directions x10 each - maintained no pain     Home Exercises and Patient Education Provided    Education provided:   - HEP   - no sharp pain with activity     Written Home Exercises Provided: yes.  Exercises were reviewed and Sha was able to demonstrate them prior to the end of the session.  Sha demonstrated good  understanding of the education provided.     See EMR under Patient Instructions for exercises provided 8/8/2019.    Assessment   Sha is a 69 y.o. male referred to outpatient Physical Therapy with a medical diagnosis of Closed fracture of transverse process of thoracic vertebra with routine healing, subsequent encounter, fall from greater than 3 foot height. Pt presents with signs and symptoms consistent with medical diagnosis, including pain in thoracic spine region and over ribs, pain with deep breathing, pain with fast movements, decreased thoracic ROM, decreased ability  to perform functional activities, decreased UE/LE and trunk strength. Pt had normal movements of lumbar and cervical motion with no increase in pain with either. Due to deficits, pt is unable to participate in normal daily activities and hobbies that he enjoys. Pt is appropriate for physical therapy to ensure a proper healing of fracture, increase strength throughout trunk and improve range of motion while decreasing pain and increasing strength. Pt instructed to perform home exercises with minimal discomfort and no sharp pain.     Pt prognosis is Good.   Pt will benefit from skilled outpatient Physical Therapy to address the deficits stated above and in the chart below, provide pt/family education, and to maximize pt's level of independence.     Plan of care discussed with patient: Yes  Pt's spiritual, cultural and educational needs considered and patient is agreeable to the plan of care and goals as stated below:     Anticipated Barriers for therapy: none    Medical Necessity is demonstrated by the following  History  Co-morbidities and personal factors that may impact the plan of care Co-morbidities:   prostate cancer, hepatitis C    Personal Factors:   no deficits     low   Examination  Body Structures and Functions, activity limitations and participation restrictions that may impact the plan of care Body Regions:   back  lower extremities  upper extremities  trunk    Body Systems:    ROM  strength  gross coordinated movement  balance  transfers  transitions  motor control  motor learning  respiratory rate    Participation Restrictions:   moderate    Activity limitations:   Learning and applying knowledge  no deficits    General Tasks and Commands  no deficits    Communication  no deficits    Mobility  lifting and carrying objects  walking  driving (bike, car, motorcycle)    Self care  no deficits    Domestic Life  shopping  cooking  doing house work (cleaning house, washing dishes,  laundry)    Interactions/Relationships  no deficits    Life Areas  no deficits    Community and Social Life  no deficits         moderate   Clinical Presentation stable and uncomplicated low   Decision Making/ Complexity Score: low     Goals:  Short Term Goals: 4 weeks      1. Pt will be independent with HEP in order to demonstrate self management.   2. Pt will report a decrease in pain at its worse to 6/10 in order to improve quality of life.   3. Pt will increase thoracic ROM to < or = to minimal limitation to decrease pain and increase functional mobility.   4. Pt will increase BLE and BUE MMT by 1/3 muscle grade in order to demonstrate increased strength.   5. Pt will be able to perform his usual work, housework activities with no limitation.      Long Term Goals: 8 weeks      1. Pt will be independent with updated HEP in order to demonstrate self management.   2. Pt will report a decrease in pain at its worse to 3/10 in order to improve quality of life.   3. Pt will increase thoracic ROM to no limitation with minimal to no increase in pain in order to improve functional mobility.   4. Pt will increase BLE and BUE MMT by 1 muscle grade to improve strength and endurance to perform daily activities.   5. Pt will have a FOTO limitation score of < or = to 21% to demonstrate improved functional mobility.   6. Pt will be able to perform his usual hobbies and recreational activities as well as moderate household work with < or = to minimal limitation.     Plan   Plan of care Certification: 8/8/2019 to 11/8/2019.    Outpatient Physical Therapy 1-2 times weekly for 10 weeks to include the following interventions: Manual Therapy, Moist Heat/ Ice, Neuromuscular Re-ed, Patient Education, Self Care, Therapeutic Activites and Therapeutic Exercise.     Ashley Dave, PT  Thank you for your referral.     I have seen the patient, reviewed the therapist's plan of care, and I agree with the plan of care.      I certify the need  for these services furnished under this plan of treatment and while under my care.      ___________________ ________ Physician/Referring Practitioner            ___________________________ Date of Signature

## 2019-08-14 ENCOUNTER — CLINICAL SUPPORT (OUTPATIENT)
Dept: REHABILITATION | Facility: HOSPITAL | Age: 69
End: 2019-08-14
Attending: FAMILY MEDICINE
Payer: MEDICARE

## 2019-08-14 DIAGNOSIS — M54.6 THORACIC SPINE PAIN: ICD-10-CM

## 2019-08-14 DIAGNOSIS — R29.898 DECREASED STRENGTH OF TRUNK AND BACK: ICD-10-CM

## 2019-08-14 DIAGNOSIS — M53.84 DECREASED ROM OF INTERVERTEBRAL DISCS OF THORACIC SPINE: ICD-10-CM

## 2019-08-14 PROCEDURE — 97110 THERAPEUTIC EXERCISES: CPT | Mod: HCNC,PN

## 2019-08-14 NOTE — PROGRESS NOTES
Physical Therapy Daily Treatment Note     Name: Sha Triplett  Clinic Number: [id number]    Therapy Diagnosis:   Encounter Diagnoses   Name Primary?    Decreased ROM of intervertebral discs of thoracic spine     Thoracic spine pain     Decreased strength of trunk and back      Physician: Jude Kee MD    Visit Date: 8/14/2019    Physician Orders: PT Eval and Treat   Medical Diagnosis from Referral:   S22.009D (ICD-10-CM) - Closed fracture of transverse process of thoracic vertebra with routine healing, subsequent encounter   W17.89XA (ICD-10-CM) - Fall from height of greater than 3 feet   Evaluation Date: 8/8/2019  Authorization Period Expiration: 12/31/2019  Plan of Care Expiration: 11/8/2019  Visit # / Visits authorized: 2/20      Time In: 10:55a  Time Out: 11:50a   Total Billable Time: 55 minutes    Precautions: Standard    Subjective     Pt reports: driving increased pain, riding his bike (1/2 mile) to the grocery store felt fine. Pt reports he only feels the really intense pain when he sneezes.   He was compliant with home exercise program.  Response to previous treatment: good   Functional change: no change     Pain: 3/10  Location: bilateral thoracic    Objective     Sha received therapeutic exercises to develop strength, endurance, ROM, flexibility, posture and core stabilization for 55 minutes including:    UBE 4'/4'  Pulleys Abd/flex x2 min ea   Thoracic mobility all directions x10 each - maintain no sharp pain   Scapular retractions 3x10, 3 sec hold  Shld Row c/ YTB 3x10  Shld Ext c/ YTB 3x10  Y's off wall 2x15  Seated shoulder press, 5#, 2x10   Supine Chest Press with 4# wand 3x10  Supine Hor Abd c/ YTB, x30  Supine shoulder flex c/ YTB x20   LTR x2min  Post Pelvic Tilt 20x  Bridges 3x10  Prone hip ext, 2x10 ea  Squats 3x10     Sha received the following manual therapy techniques: Joint mobilizations and Soft tissue Mobilization were applied to the: thoracic/lumbar spine for 00 minutes,  including:    Home Exercises Provided and Patient Education Provided     Education provided:   -activity modification, safety during movements    Written Home Exercises Provided: Patient instructed to cont prior HEP.  Exercises were reviewed and Sha was able to demonstrate them prior to the end of the session.  Sha demonstrated good  understanding of the education provided.     See EMR under Patient Instructions for exercises provided prior visit.    Assessment     Pt had a good tolerance to his initial therapy session. Pt continues with difficulty and pain rotating to the L, and minimal discomfort rotating to the R and thoracic flexion. No pain noted with thoracic extension or sidebending. Pt had no other complaints of pain throughout exercises. Pt would benefit from further trunk strengthening and LE/UE strengthening. Progress as able and tolerated.     Sha is progressing well towards his goals.   Pt prognosis is Good.     Pt will continue to benefit from skilled outpatient physical therapy to address the deficits listed in the problem list box on initial evaluation, provide pt/family education and to maximize pt's level of independence in the home and community environment.     Pt's spiritual, cultural and educational needs considered and pt agreeable to plan of care and goals.     Anticipated barriers to physical therapy: None    Goals:  Short Term Goals: 4 weeks      1. Pt will be independent with HEP in order to demonstrate self management.   2. Pt will report a decrease in pain at its worse to 6/10 in order to improve quality of life.   3. Pt will increase thoracic ROM to < or = to minimal limitation to decrease pain and increase functional mobility.   4. Pt will increase BLE and BUE MMT by 1/3 muscle grade in order to demonstrate increased strength.   5. Pt will be able to perform his usual work, housework activities with no limitation.      Long Term Goals: 8 weeks      1. Pt will be independent  with updated HEP in order to demonstrate self management.   2. Pt will report a decrease in pain at its worse to 3/10 in order to improve quality of life.   3. Pt will increase thoracic ROM to no limitation with minimal to no increase in pain in order to improve functional mobility.   4. Pt will increase BLE and BUE MMT by 1 muscle grade to improve strength and endurance to perform daily activities.   5. Pt will have a FOTO limitation score of < or = to 21% to demonstrate improved functional mobility.   6. Pt will be able to perform his usual hobbies and recreational activities as well as moderate household work with < or = to minimal limitation.     Plan     Continue progressing core and LE/UE strengthening    Ashley Dave, PT   8/14/2019

## 2019-08-19 ENCOUNTER — CLINICAL SUPPORT (OUTPATIENT)
Dept: REHABILITATION | Facility: HOSPITAL | Age: 69
End: 2019-08-19
Attending: FAMILY MEDICINE
Payer: MEDICARE

## 2019-08-19 DIAGNOSIS — R29.898 DECREASED STRENGTH OF TRUNK AND BACK: ICD-10-CM

## 2019-08-19 DIAGNOSIS — M53.84 DECREASED ROM OF INTERVERTEBRAL DISCS OF THORACIC SPINE: ICD-10-CM

## 2019-08-19 DIAGNOSIS — M54.6 THORACIC SPINE PAIN: ICD-10-CM

## 2019-08-19 PROCEDURE — 97110 THERAPEUTIC EXERCISES: CPT | Mod: HCNC,PN

## 2019-08-19 NOTE — PROGRESS NOTES
Physical Therapy Daily Treatment Note     Name: Sha Triplett  Clinic Number: [id number]    Therapy Diagnosis:   Encounter Diagnoses   Name Primary?    Decreased ROM of intervertebral discs of thoracic spine     Thoracic spine pain     Decreased strength of trunk and back      Physician: Jude Kee MD    Visit Date: 8/19/2019    Physician Orders: PT Eval and Treat   Medical Diagnosis from Referral:   S22.009D (ICD-10-CM) - Closed fracture of transverse process of thoracic vertebra with routine healing, subsequent encounter   W17.89XA (ICD-10-CM) - Fall from height of greater than 3 feet   Evaluation Date: 8/8/2019  Authorization Period Expiration: 12/31/2019  Plan of Care Expiration: 11/8/2019  Visit # / Visits authorized: 3/20      Time In: 800  Time Out: 910  Total Billable Time: 60 minutes    Precautions: Standard    Subjective     Pt reports: he is doing well, moderate soreness after last session. States he attempted to return to sleeping in bed, however the mid-thoracic pain worsened.  He was compliant with home exercise program.  Response to previous treatment: good   Functional change: no change     Pain: 3/10  Location: bilateral thoracic    Objective     Sha received therapeutic exercises to develop strength, endurance, ROM, flexibility, posture and core stabilization for 60 minutes including:    UBE 4'/4'  Pulleys Abd/flex x2 min ea   Thoracic mobility all directions x10 each - maintain no sharp pain   Scapular retractions 2x10, 3 sec hold  Shld Row c/ YTB 3x10  Shld Ext c/ YTB 3x10  Y's with Lift-off wall 2x10  Seated shoulder press, 5#, 2x10   Supine Chest Press with 5# db 3x10  Supine Hor Abd c/ YTB, x30  Supine shoulder flex c/ YTB x20   LTR x2min  Post Pelvic Tilt 20x  Bridges 3x10  Prone hip ext, 2x10 ea  Squats 3x10   +Shuttle Squat 2-cord 3x10    Sha received the following manual therapy techniques: Joint mobilizations and Soft tissue Mobilization were applied to the:  thoracic/lumbar spine for 00 minutes, including:    Home Exercises Provided and Patient Education Provided     Education provided:   -activity modification, safety during movements    Written Home Exercises Provided: Patient instructed to cont prior HEP.  Exercises were reviewed and Sha was able to demonstrate them prior to the end of the session.  Sha demonstrated good  understanding of the education provided.     See EMR under Patient Instructions for exercises provided prior visit.    Assessment     Pt had a good tolerance to his initial therapy session. Appropriate therapeutic effect achieved during treatment session, with no acute sharp pain provoked. Lower trap strengthening progressed when standing against wall, with cueing provided for proper exercise form and decreased compensatory trunk extension. Good response to LE strength and endurance training on shuttle equipment      Sha is progressing well towards his goals.   Pt prognosis is Good.     Pt will continue to benefit from skilled outpatient physical therapy to address the deficits listed in the problem list box on initial evaluation, provide pt/family education and to maximize pt's level of independence in the home and community environment.     Pt's spiritual, cultural and educational needs considered and pt agreeable to plan of care and goals.     Anticipated barriers to physical therapy: None    Goals:  Short Term Goals: 4 weeks      1. Pt will be independent with HEP in order to demonstrate self management. In progress  2. Pt will report a decrease in pain at its worse to 6/10 in order to improve quality of life.  In progress  3. Pt will increase thoracic ROM to < or = to minimal limitation to decrease pain and increase functional mobility. In progress  4. Pt will increase BLE and BUE MMT by 1/3 muscle grade in order to demonstrate increased strength.  In progress  5. Pt will be able to perform his usual work, housework activities with no  limitation.  In progress     Long Term Goals: 8 weeks      1. Pt will be independent with updated HEP in order to demonstrate self management.   2. Pt will report a decrease in pain at its worse to 3/10 in order to improve quality of life.   3. Pt will increase thoracic ROM to no limitation with minimal to no increase in pain in order to improve functional mobility.   4. Pt will increase BLE and BUE MMT by 1 muscle grade to improve strength and endurance to perform daily activities.   5. Pt will have a FOTO limitation score of < or = to 21% to demonstrate improved functional mobility.   6. Pt will be able to perform his usual hobbies and recreational activities as well as moderate household work with < or = to minimal limitation.     Plan     Continue progressing core and LE/UE strengthening    Kevin Kee, PT   8/14/2019

## 2019-08-27 ENCOUNTER — CLINICAL SUPPORT (OUTPATIENT)
Dept: REHABILITATION | Facility: HOSPITAL | Age: 69
End: 2019-08-27
Attending: FAMILY MEDICINE
Payer: MEDICARE

## 2019-08-27 DIAGNOSIS — R29.898 DECREASED STRENGTH OF TRUNK AND BACK: ICD-10-CM

## 2019-08-27 DIAGNOSIS — M54.6 THORACIC SPINE PAIN: ICD-10-CM

## 2019-08-27 DIAGNOSIS — M53.84 DECREASED ROM OF INTERVERTEBRAL DISCS OF THORACIC SPINE: ICD-10-CM

## 2019-08-27 PROCEDURE — 97110 THERAPEUTIC EXERCISES: CPT | Mod: HCNC,PN

## 2019-08-27 NOTE — PROGRESS NOTES
Physical Therapy Daily Treatment Note     Name: Sha Triplett  Clinic Number: [id number]    Therapy Diagnosis:   Encounter Diagnoses   Name Primary?    Decreased ROM of intervertebral discs of thoracic spine     Thoracic spine pain     Decreased strength of trunk and back      Physician: Jude Kee MD    Visit Date: 8/27/2019    Physician Orders: PT Eval and Treat   Medical Diagnosis from Referral:   S22.009D (ICD-10-CM) - Closed fracture of transverse process of thoracic vertebra with routine healing, subsequent encounter   W17.89XA (ICD-10-CM) - Fall from height of greater than 3 feet   Evaluation Date: 8/8/2019  Authorization Period Expiration: 12/31/2019  Plan of Care Expiration: 11/8/2019  Visit # / Visits authorized: 4/20      Time In: 830  Time Out: 930  Total Billable Time: 60 minutes    Precautions: Standard    Subjective     Pt reports: Doing well, when he forgets to do HEP he will feel tight afterwards.  He was compliant with home exercise program.  Response to previous treatment: good   Functional change: no change     Pain: 3/10  Location: bilateral thoracic    Objective     Sha received therapeutic exercises to develop strength, endurance, ROM, flexibility, posture and core stabilization for 60 minutes including:    UBE 3'/3' Resist 3  Pulleys Abd/flex x2 min ea   Thoracic mobility all directions x10 each - maintain no sharp pain     Shld Row c/ RTB 3x10  Shld Ext c/ RTB 3x10  +[place] c/ YTB 15x ea  +[place]off Press c/ YTB 20x ea  Wall Squats with ball 3x10   Shuttle Squat 2-cord 3x10  Y's with Lift-off wall 2x10  Seated shoulder press, 5#, 2x10     Supine Chest Press with 5# db 3x10  Supine Hor Abd c/ YTB, x30  Supine shoulder flex c/ YTB x20   LTR with contra UE reach 15x ea  DKTC with BUE reach 15x  Post Pelvic Tilt 20x  Bridges 3x10  +Prone Alt UE/LE ext, 2x10 ea    Scapular retractions 2x10, 3 sec hold    Sha received the following manual therapy techniques: Joint  mobilizations and Soft tissue Mobilization were applied to the: thoracic/lumbar spine for 00 minutes, including:    Home Exercises Provided and Patient Education Provided     Education provided:   -activity modification, safety during movements    Written Home Exercises Provided: Patient instructed to cont prior HEP.  Exercises were reviewed and Sha was able to demonstrate them prior to the end of the session.  Sha demonstrated good  understanding of the education provided.     See EMR under Patient Instructions for exercises provided prior visit.    Assessment     Pt had a good tolerance to his initial therapy session. Resistance added to thoracolumbar rotation this session without adverse effect, appropriate muscle fatigue achieved. Posterior chain strengthening progressed in prone this session with fair motor coordination and control to perform with proper form.    Sha is progressing well towards his goals.   Pt prognosis is Good.     Pt will continue to benefit from skilled outpatient physical therapy to address the deficits listed in the problem list box on initial evaluation, provide pt/family education and to maximize pt's level of independence in the home and community environment.     Pt's spiritual, cultural and educational needs considered and pt agreeable to plan of care and goals.     Anticipated barriers to physical therapy: None    Goals:  Short Term Goals: 4 weeks      1. Pt will be independent with HEP in order to demonstrate self management. In progress  2. Pt will report a decrease in pain at its worse to 6/10 in order to improve quality of life.  In progress  3. Pt will increase thoracic ROM to < or = to minimal limitation to decrease pain and increase functional mobility. In progress  4. Pt will increase BLE and BUE MMT by 1/3 muscle grade in order to demonstrate increased strength.  In progress  5. Pt will be able to perform his usual work, housework activities with no limitation.  In  progress     Long Term Goals: 8 weeks      1. Pt will be independent with updated HEP in order to demonstrate self management.   2. Pt will report a decrease in pain at its worse to 3/10 in order to improve quality of life.   3. Pt will increase thoracic ROM to no limitation with minimal to no increase in pain in order to improve functional mobility.   4. Pt will increase BLE and BUE MMT by 1 muscle grade to improve strength and endurance to perform daily activities.   5. Pt will have a FOTO limitation score of < or = to 21% to demonstrate improved functional mobility.   6. Pt will be able to perform his usual hobbies and recreational activities as well as moderate household work with < or = to minimal limitation.     Plan     Continue progressing core and LE/UE strengthening    Kevin Kee, PT   08/27/2019

## 2019-09-04 ENCOUNTER — LAB VISIT (OUTPATIENT)
Dept: LAB | Facility: HOSPITAL | Age: 69
End: 2019-09-04
Attending: UROLOGY
Payer: MEDICARE

## 2019-09-04 ENCOUNTER — CLINICAL SUPPORT (OUTPATIENT)
Dept: REHABILITATION | Facility: HOSPITAL | Age: 69
End: 2019-09-04
Attending: FAMILY MEDICINE
Payer: MEDICARE

## 2019-09-04 DIAGNOSIS — R29.898 DECREASED STRENGTH OF TRUNK AND BACK: ICD-10-CM

## 2019-09-04 DIAGNOSIS — C61 PROSTATE CANCER: ICD-10-CM

## 2019-09-04 DIAGNOSIS — M53.84 DECREASED ROM OF INTERVERTEBRAL DISCS OF THORACIC SPINE: ICD-10-CM

## 2019-09-04 DIAGNOSIS — M54.6 THORACIC SPINE PAIN: ICD-10-CM

## 2019-09-04 LAB — COMPLEXED PSA SERPL-MCNC: 6.3 NG/ML (ref 0–4)

## 2019-09-04 PROCEDURE — 97110 THERAPEUTIC EXERCISES: CPT | Mod: HCNC,PN

## 2019-09-04 PROCEDURE — 36415 COLL VENOUS BLD VENIPUNCTURE: CPT | Mod: HCNC,PO

## 2019-09-04 PROCEDURE — 84153 ASSAY OF PSA TOTAL: CPT | Mod: HCNC

## 2019-09-04 NOTE — PROGRESS NOTES
Physical Therapy Daily Treatment Note     Name: Sha Triplett  Clinic Number: [id number]    Therapy Diagnosis:   Encounter Diagnoses   Name Primary?    Decreased ROM of intervertebral discs of thoracic spine     Thoracic spine pain     Decreased strength of trunk and back      Physician: Jude Kee MD    Visit Date: 9/4/2019    Physician Orders: PT Eval and Treat   Medical Diagnosis from Referral:   S22.009D (ICD-10-CM) - Closed fracture of transverse process of thoracic vertebra with routine healing, subsequent encounter   W17.89XA (ICD-10-CM) - Fall from height of greater than 3 feet   Evaluation Date: 8/8/2019  Authorization Period Expiration: 12/31/2019  Plan of Care Expiration: 11/8/2019  Visit # / Visits authorized: 5/20    Time In: 9:00a  Time Out: 10:00a  Total Billable Time: 30 minutes    Precautions: Standard    Subjective     Pt reports: he doesn't have pain with breathing. He states he had some pain the other week on Friday, Saturday, and Sunday but today he doesn't really have pain unless he moves in a certain direction or moves fast.   He was compliant with home exercise program.  Response to previous treatment: good   Functional change: no change     Pain: 3/10  Location: bilateral thoracic    Objective     CMS Impairment/Limitation/Restriction for FOTO Lumbar Spine Survey  Status Limitation G-Code CMS Severity Modifier  Intake 65% 35%  Predicted 79% 21% Goal Status+ CJ - At least 20 percent but less than 40 percent  9/4/2019 72% 28% Current Status CJ - At least 20 percent but less than 40 percent    Thoracic Range of Motion:     Degrees Pain   Flexion WFL    None          Extension Minimal limitation    None          Left Side Bending Minimal limitation None    Right Side Bending WFL None         Left rotation    Min limitation Discomfort    Right Rotation    WFL discomfort      Lumbar Range of Motion:     Degrees Pain   Flexion WFL    None    Extension WFL  None    Left Side Bending  Minimal limitation None    Right Side Bending WFL None          Left rotation    WFL Pulling    Right Rotation    WFL Pulling      Upper Extremity Strength  (R) UE   (L) UE     Shoulder flexion: 4+/5 Shoulder flexion: 4+/5   Shoulder Abduction: 4+/5 Shoulder abduction: 4+/5    Shoulder ER 4+/5 Shoulder ER 4+/5   Shoulder IR 4+/5 Shoulder IR 4+/5   Elbow flexion: 4+/5 Elbow flexion: 4+/5   Elbow extension: 4+/5 Elbow extension: 4+/5   Wrist flexion: 4+/5 Wrist flexion: 4+/5   Wrist extension: 4+/5 Wrist extension: 4+/5   Lower Trap 4/5 Lower Trap 4/5   Middle Trap 4/5 Middle Trap 4/5   Rhomboids 4/5 Rhomboids 4/5         Lower Extremity Strength  Right LE   Left LE     Knee extension: 5/5  Knee extension: 5/5   Knee flexion: 5/5 Knee flexion: 5/5   Hip flexion: 4+/5 Hip flexion: 4+/5    Hip extension:  4+/5 Hip extension: 3+/5   Hip abduction: 4+/5 Hip abduction: 4+/5   Hip ER 4+/5 Hip ER 4+/5    Hip IR 4+/5 Hip IR 4+/5   Hip adduction: 4/5 Hip adduction 4/5   Ankle dorsiflexion: 4+/5 Ankle dorsiflexion: 4+/5   Ankle plantarflexion: 4+/5 Ankle plantarflexion: 4+/5     Sha received therapeutic exercises to develop strength, endurance, ROM, flexibility, posture and core stabilization for 60 minutes including:    UBE 3'/3' Resist 3  Pulleys Abd/flex x2 min ea   Thoracic mobility all directions x10 each  - HEP    Shld Row c/ GTB 3x10  Shld Ext c/ RTB 3x10  [place] c/ YTB 15x ea  Palloff Press c/ YTB 20x ea  Wall Squats with ball 3x10   Shuttle Squat 2-cord 3x10  Y's with Lift-off wall 2x10  +Squat taps with shoulder press 5# DB, 2x10   +[place] pushing 2x down turf     +Wall push ups 2x10   Supine Chest Press with 5# db 3x10  Supine Hor Abd c/ YTB, x30  Supine shoulder flex c/ YTB x20   LTR with contra UE reach 15x ea  DKTC with BUE reach 15x  +[place] 2x10  Bridges 3x10  Prone Alt UE/LE ext, 2x10 ea    Sha received the following manual therapy techniques: Joint mobilizations and Soft tissue Mobilization were  applied to the: thoracic/lumbar spine for 00 minutes, including:    Home Exercises Provided and Patient Education Provided     Education provided:   -activity modification, safety during movements    Written Home Exercises Provided: Patient instructed to cont prior HEP.  Exercises were reviewed and Sha was able to demonstrate them prior to the end of the session.  Sha demonstrated good  understanding of the education provided.     See EMR under Patient Instructions for exercises provided prior visit.    Assessment     Pt had a good tolerance to today's therapy session. A progress note was performed. Pt has met 4/5 short term goals so far and is progressing as expected. He continues with discomfort and pain in thoracic spine during higher level activities and work activities. He continues to be compliant with home exercises, his pain has decreased to its worse being 3-4/10, he has improved his thoracic ROM and increased his UE and LE strength to WFL. He continues with decreased core and trunk strength as well as endurance deficits most notable with higher level activities. Pt would benefit from further therapy sessions to keep improving postural awareness, decreasing pain, and improving strength.     Sha is progressing well towards his goals.   Pt prognosis is Good.     Pt will continue to benefit from skilled outpatient physical therapy to address the deficits listed in the problem list box on initial evaluation, provide pt/family education and to maximize pt's level of independence in the home and community environment.     Pt's spiritual, cultural and educational needs considered and pt agreeable to plan of care and goals.     Anticipated barriers to physical therapy: None    Goals:  Short Term Goals: 4 weeks      1. Pt will be independent with HEP in order to demonstrate self management. Goal Met 9/4/2019  2. Pt will report a decrease in pain at its worse to 6/10 in order to improve quality of life.   Goal Met 9/4/2019  3. Pt will increase thoracic ROM to < or = to minimal limitation to decrease pain and increase functional mobility. Goal Met 9/4/2019  4. Pt will increase BLE and BUE MMT by 1/3 muscle grade in order to demonstrate increased strength.  Goal Met 9/4/2019   5. Pt will be able to perform his usual work, housework activities with no limitation.  In progress, ongoing     Long Term Goals: 8 weeks      1. Pt will be independent with updated HEP in order to demonstrate self management.   2. Pt will report a decrease in pain at its worse to 3/10 in order to improve quality of life.   3. Pt will increase thoracic ROM to no limitation with minimal to no increase in pain in order to improve functional mobility.   4. Pt will increase BLE and BUE MMT by 1 muscle grade to improve strength and endurance to perform daily activities.   5. Pt will have a FOTO limitation score of < or = to 21% to demonstrate improved functional mobility.   6. Pt will be able to perform his usual hobbies and recreational activities as well as moderate household work with < or = to minimal limitation.     Plan     Continue progressing core and LE/UE strengthening    Ashley Dave, PT   09/04/2019

## 2019-09-10 ENCOUNTER — CLINICAL SUPPORT (OUTPATIENT)
Dept: REHABILITATION | Facility: HOSPITAL | Age: 69
End: 2019-09-10
Attending: FAMILY MEDICINE
Payer: MEDICARE

## 2019-09-10 DIAGNOSIS — M54.6 THORACIC SPINE PAIN: ICD-10-CM

## 2019-09-10 DIAGNOSIS — M53.84 DECREASED ROM OF INTERVERTEBRAL DISCS OF THORACIC SPINE: ICD-10-CM

## 2019-09-10 DIAGNOSIS — R29.898 DECREASED STRENGTH OF TRUNK AND BACK: ICD-10-CM

## 2019-09-10 PROCEDURE — 97110 THERAPEUTIC EXERCISES: CPT | Mod: HCNC,PN

## 2019-09-10 NOTE — PROGRESS NOTES
Physical Therapy Daily Treatment Note     Name: Sha Triplett  Clinic Number: [id number]    Therapy Diagnosis:   Encounter Diagnoses   Name Primary?    Decreased ROM of intervertebral discs of thoracic spine     Thoracic spine pain     Decreased strength of trunk and back      Physician: Jude Kee MD    Visit Date: 9/10/2019    Physician Orders: PT Eval and Treat   Medical Diagnosis from Referral:   S22.009D (ICD-10-CM) - Closed fracture of transverse process of thoracic vertebra with routine healing, subsequent encounter   W17.89XA (ICD-10-CM) - Fall from height of greater than 3 feet   Evaluation Date: 8/8/2019  Authorization Period Expiration: 12/31/2019  Plan of Care Expiration: 11/8/2019  Visit # / Visits authorized: 6/20    Time In: 9:00a  Time Out: 10:00a  Total Billable Time: 60 minutes    Precautions: Standard    Subjective     Pt reports: reports occasional mild ache pain. Pt expressing pain relief while performing exercises, tano rowing.   He was compliant with home exercise program.  Response to previous treatment: good   Functional change: no change     Pain: 1/10  Location: bilateral thoracic    Objective     CMS Impairment/Limitation/Restriction for FOTO Lumbar Spine Survey  Status Limitation G-Code CMS Severity Modifier  Intake 65% 35%  Predicted 79% 21% Goal Status+ CJ - At least 20 percent but less than 40 percent  9/4/2019 72% 28% Current Status CJ - At least 20 percent but less than 40 percent    Sha received therapeutic exercises to develop strength, endurance, ROM, flexibility, posture and core stabilization for 60 minutes including:    UBE 3'/3' Resist 3  Pulleys Abd/flex x2 min ea   Thoracic mobility all directions x10 each  - HEP    Shld Row c/ GTB 3x10  Shld Ext c/ RTB 3x10  [place] c/ YTB 15x ea  Palloff Press c/ YTB 20x ea  Wall Squats with ball 3x10   Shuttle Squat 2-cord 3x10  Y's with Lift-off wall 2x10  +Squat taps with shoulder press 5# DB, 2x10   Crate pushing  2x down turf     Wall push ups 2x10   Supine Chest Press with 5# db 3x10  Supine Hor Abd c/ YTB, x30  Supine shoulder flex c/ YTB x20   LTR with contra UE reach 15x ea  DKTC with BUE reach 15x  +Deadbug 2x10  Bridges 3x10  Prone Alt UE/LE ext, 2x10 ea    Sha received the following manual therapy techniques: Joint mobilizations and Soft tissue Mobilization were applied to the: thoracic/lumbar spine for 00 minutes, including:    Home Exercises Provided and Patient Education Provided     Education provided:   -activity modification, safety during movements    Written Home Exercises Provided: Patient instructed to cont prior HEP.  Exercises were reviewed and Sha was able to demonstrate them prior to the end of the session.  Sha demonstrated good  understanding of the education provided.     See EMR under Patient Instructions for exercises provided prior visit.    Assessment     Pt tolerated session well with r/o decreased pain during therex. Occasional cueing required for recall and proper body mechanics. Pt would benefit from further therapy sessions to keep improving postural awareness, decreasing pain, and improving strength.     Sha is progressing well towards his goals.   Pt prognosis is Good.     Pt will continue to benefit from skilled outpatient physical therapy to address the deficits listed in the problem list box on initial evaluation, provide pt/family education and to maximize pt's level of independence in the home and community environment.     Pt's spiritual, cultural and educational needs considered and pt agreeable to plan of care and goals.     Anticipated barriers to physical therapy: None    Goals:  Short Term Goals: 4 weeks      1. Pt will be independent with HEP in order to demonstrate self management. Goal Met 9/4/2019  2. Pt will report a decrease in pain at its worse to 6/10 in order to improve quality of life.  Goal Met 9/4/2019  3. Pt will increase thoracic ROM to < or = to minimal  limitation to decrease pain and increase functional mobility. Goal Met 9/4/2019  4. Pt will increase BLE and BUE MMT by 1/3 muscle grade in order to demonstrate increased strength.  Goal Met 9/4/2019   5. Pt will be able to perform his usual work, housework activities with no limitation.  In progress, ongoing     Long Term Goals: 8 weeks      1. Pt will be independent with updated HEP in order to demonstrate self management.   2. Pt will report a decrease in pain at its worse to 3/10 in order to improve quality of life.   3. Pt will increase thoracic ROM to no limitation with minimal to no increase in pain in order to improve functional mobility.   4. Pt will increase BLE and BUE MMT by 1 muscle grade to improve strength and endurance to perform daily activities.   5. Pt will have a FOTO limitation score of < or = to 21% to demonstrate improved functional mobility.   6. Pt will be able to perform his usual hobbies and recreational activities as well as moderate household work with < or = to minimal limitation.     Plan     Continue progressing core and LE/UE strengthening    Jennifer Rueda, PTA   09/10/2019

## 2019-09-15 ENCOUNTER — PATIENT OUTREACH (OUTPATIENT)
Dept: ADMINISTRATIVE | Facility: OTHER | Age: 69
End: 2019-09-15

## 2019-09-16 ENCOUNTER — OFFICE VISIT (OUTPATIENT)
Dept: UROLOGY | Facility: CLINIC | Age: 69
End: 2019-09-16
Payer: MEDICARE

## 2019-09-16 VITALS
BODY MASS INDEX: 26.14 KG/M2 | WEIGHT: 182.56 LBS | SYSTOLIC BLOOD PRESSURE: 122 MMHG | DIASTOLIC BLOOD PRESSURE: 80 MMHG | HEIGHT: 70 IN

## 2019-09-16 DIAGNOSIS — R33.9 INCOMPLETE EMPTYING OF BLADDER: ICD-10-CM

## 2019-09-16 DIAGNOSIS — R35.1 NOCTURIA: ICD-10-CM

## 2019-09-16 DIAGNOSIS — C61 PROSTATE CANCER: Primary | ICD-10-CM

## 2019-09-16 DIAGNOSIS — N52.9 ED (ERECTILE DYSFUNCTION) OF ORGANIC ORIGIN: ICD-10-CM

## 2019-09-16 LAB
BILIRUB SERPL-MCNC: NORMAL MG/DL
BLOOD URINE, POC: NORMAL
COLOR, POC UA: YELLOW
GLUCOSE UR QL STRIP: NORMAL
KETONES UR QL STRIP: NORMAL
LEUKOCYTE ESTERASE URINE, POC: NORMAL
NITRITE, POC UA: NORMAL
PH, POC UA: NORMAL
PROTEIN, POC: NORMAL
SPECIFIC GRAVITY, POC UA: 1030
UROBILINOGEN, POC UA: NORMAL

## 2019-09-16 PROCEDURE — 81001 PR  URINALYSIS, AUTO, W/SCOPE: ICD-10-PCS | Mod: HCNC,S$GLB,, | Performed by: UROLOGY

## 2019-09-16 PROCEDURE — 1101F PT FALLS ASSESS-DOCD LE1/YR: CPT | Mod: HCNC,CPTII,S$GLB, | Performed by: UROLOGY

## 2019-09-16 PROCEDURE — 3074F SYST BP LT 130 MM HG: CPT | Mod: HCNC,CPTII,S$GLB, | Performed by: UROLOGY

## 2019-09-16 PROCEDURE — 3074F PR MOST RECENT SYSTOLIC BLOOD PRESSURE < 130 MM HG: ICD-10-PCS | Mod: HCNC,CPTII,S$GLB, | Performed by: UROLOGY

## 2019-09-16 PROCEDURE — 99999 PR PBB SHADOW E&M-EST. PATIENT-LVL III: ICD-10-PCS | Mod: PBBFAC,HCNC,, | Performed by: UROLOGY

## 2019-09-16 PROCEDURE — 99214 PR OFFICE/OUTPT VISIT, EST, LEVL IV, 30-39 MIN: ICD-10-PCS | Mod: HCNC,25,S$GLB, | Performed by: UROLOGY

## 2019-09-16 PROCEDURE — 1101F PR PT FALLS ASSESS DOC 0-1 FALLS W/OUT INJ PAST YR: ICD-10-PCS | Mod: HCNC,CPTII,S$GLB, | Performed by: UROLOGY

## 2019-09-16 PROCEDURE — 3079F DIAST BP 80-89 MM HG: CPT | Mod: HCNC,CPTII,S$GLB, | Performed by: UROLOGY

## 2019-09-16 PROCEDURE — 3079F PR MOST RECENT DIASTOLIC BLOOD PRESSURE 80-89 MM HG: ICD-10-PCS | Mod: HCNC,CPTII,S$GLB, | Performed by: UROLOGY

## 2019-09-16 PROCEDURE — 99999 PR PBB SHADOW E&M-EST. PATIENT-LVL III: CPT | Mod: PBBFAC,HCNC,, | Performed by: UROLOGY

## 2019-09-16 PROCEDURE — 81001 URINALYSIS AUTO W/SCOPE: CPT | Mod: HCNC,S$GLB,, | Performed by: UROLOGY

## 2019-09-16 PROCEDURE — 99214 OFFICE O/P EST MOD 30 MIN: CPT | Mod: HCNC,25,S$GLB, | Performed by: UROLOGY

## 2019-09-16 RX ORDER — CIPROFLOXACIN 500 MG/1
500 TABLET ORAL 2 TIMES DAILY
Qty: 6 TABLET | Refills: 0 | Status: SHIPPED | OUTPATIENT
Start: 2019-09-16 | End: 2019-09-19

## 2019-09-16 NOTE — PROGRESS NOTES
Subjective:       Patient ID: Sha Triplett is a 69 y.o. male who was last seen in this office 6/18/2019    Chief Complaint:   Chief Complaint   Patient presents with    Prostate Cancer     3 month f/u with psa        Prostate Cancer     He underwent a prostate needle biopsy on 11/28/2018.  His biopsy was indicated due to: Elevated PSA.  Afterwards he experienced: Gross Hematuria, Blood in stool and Hematospermia.  These symptoms have resolved.  His PSA prior to biopsy was 4.4.  His prostate size was 55 grams.  The ultrasound did not show a median lobe.  He currently does not have erectile dysfunction.  His pathology showed: prostate cancer.  He is back with a new PSA.    ACTIVE MEDICAL ISSUES:  Patient Active Problem List   Diagnosis    Hypertension    Chronic hepatitis C without hepatic coma    Other symptoms involving digestive system(587.26)    Hematospermia    BPH with obstruction/lower urinary tract symptoms    Nocturia    Prostate cancer    Muscle spasm    Decreased ROM of intervertebral discs of thoracic spine    Thoracic spine pain    Decreased strength of trunk and back       ALLERGIES AND MEDICATIONS: updated and reviewed.  Review of patient's allergies indicates:  No Known Allergies  Current Outpatient Medications   Medication Sig    b complex vitamins tablet Take 1 tablet by mouth once daily.    cyanocobalamin, vitamin B-12, (VITAMIN B-12) 50 mcg tablet Take 50 mcg by mouth once daily.    diphenhydrAMINE (BENADRYL) 50 MG tablet Take 50 mg by mouth nightly as needed for Itching.    docusate sodium (COLACE) 100 MG capsule Take 1 capsule (100 mg total) by mouth 2 (two) times daily as needed for Constipation.    ergocalciferol (DRISDOL) 8,000 unit/mL Drop Take 0.25 mLs (2,000 Units total) by mouth once daily.    fish oil-omega-3 fatty acids 300-1,000 mg capsule Take 2 g by mouth once daily.    gabapentin (NEURONTIN) 100 MG capsule Take 300 mg by mouth.    ibuprofen (ADVIL,MOTRIN) 800  "MG tablet Take 1 tablet (800 mg total) by mouth every 6 (six) hours as needed for Pain.    milk thistle 175 mg tablet Take 175 mg by mouth once daily.    multivitamin capsule Take 1 capsule by mouth once daily.    PSYLLIUM SEED, WITH DEXTROSE, (CILIUM ORAL) Take by mouth.    tiZANidine (ZANAFLEX) 4 MG tablet Take 1 tablet (4 mg total) by mouth every 8 (eight) hours.    ciprofloxacin HCl (CIPRO) 500 MG tablet Take 1 tablet (500 mg total) by mouth 2 (two) times daily. for 6 doses     Current Facility-Administered Medications   Medication    gentamicin injection 80 mg       Review of Systems   Constitutional: Negative for activity change, fatigue, fever and unexpected weight change.   HENT: Negative for congestion.    Eyes: Negative for redness.   Respiratory: Negative for chest tightness and shortness of breath.    Cardiovascular: Negative for chest pain and leg swelling.   Gastrointestinal: Negative for abdominal pain, constipation, diarrhea, nausea and vomiting.   Genitourinary: Negative for dysuria, flank pain, frequency, hematuria, penile pain, penile swelling, scrotal swelling, testicular pain and urgency.   Musculoskeletal: Negative for arthralgias and back pain.   Neurological: Negative for dizziness and light-headedness.   Psychiatric/Behavioral: Negative for behavioral problems and confusion. The patient is not nervous/anxious.    All other systems reviewed and are negative.      Objective:      Vitals:    09/16/19 1345   BP: 122/80   BP Location: Left arm   Patient Position: Sitting   BP Method: Large (Manual)   Weight: 82.8 kg (182 lb 8.7 oz)   Height: 5' 10" (1.778 m)     Physical Exam   Nursing note and vitals reviewed.  Constitutional: He is oriented to person, place, and time. He appears well-developed and well-nourished.   HENT:   Head: Normocephalic.   Eyes: Conjunctivae are normal.   Neck: Normal range of motion. Neck supple. No tracheal deviation present. No thyromegaly present. "   Cardiovascular: Normal rate and normal heart sounds.    Pulmonary/Chest: Effort normal and breath sounds normal. No respiratory distress. He has no wheezes.   Abdominal: Soft. Bowel sounds are normal. There is no hepatosplenomegaly. There is no tenderness. There is no rebound and no CVA tenderness. No hernia.   Musculoskeletal: Normal range of motion. He exhibits no edema or tenderness.   Lymphadenopathy:     He has no cervical adenopathy.   Neurological: He is alert and oriented to person, place, and time.   Skin: Skin is warm and dry. No rash noted. No erythema.     Psychiatric: He has a normal mood and affect. His behavior is normal. Judgment and thought content normal.       Urine dipstick shows negative for all components.  Micro exam: negative for WBC's or RBC's.    PSA DIAGNOSTIC 0.00 - 4.00 ng/mL 6.3High   4.6High  CM   Comment: PSA Expected levels:   Hormonal Therapy: <0.05 ng/ml   Prostatectomy: <0.01 ng/ml   Radiation Therapy: <1.00 ng/ml    Resulting Agency  [facility]      Narrative   Performed by: [facility]   PSA 3 months      Specimen Collected: 09/04/19 10:32 Last Resulted: 09/04/19 17:38            Assessment:       1. Prostate cancer    2. Nocturia    3. ED (erectile dysfunction) of organic origin    4. Incomplete emptying of bladder          Plan:       1. Prostate cancer  He is almost due for his annual biopsy, but he needs one also due to his increase in PSA.    - POCT urinalysis, dipstick or tablet reag  - ciprofloxacin HCl (CIPRO) 500 MG tablet; Take 1 tablet (500 mg total) by mouth 2 (two) times daily. for 6 doses  Dispense: 6 tablet; Refill: 0  - US Biopsy Prostate Singl Multi Specimens; Future    2. Nocturia  Limit evening fluids    3. ED (erectile dysfunction) of organic origin  Cialis    4. Incomplete emptying of bladder  stable            Follow up for Prostate Biopsy.

## 2019-09-17 ENCOUNTER — CLINICAL SUPPORT (OUTPATIENT)
Dept: REHABILITATION | Facility: HOSPITAL | Age: 69
End: 2019-09-17
Attending: FAMILY MEDICINE
Payer: MEDICARE

## 2019-09-17 DIAGNOSIS — R29.898 DECREASED STRENGTH OF TRUNK AND BACK: ICD-10-CM

## 2019-09-17 DIAGNOSIS — M53.84 DECREASED ROM OF INTERVERTEBRAL DISCS OF THORACIC SPINE: ICD-10-CM

## 2019-09-17 DIAGNOSIS — M54.6 THORACIC SPINE PAIN: ICD-10-CM

## 2019-09-17 PROCEDURE — 97110 THERAPEUTIC EXERCISES: CPT | Mod: HCNC,PN

## 2019-09-17 NOTE — PROGRESS NOTES
Physical Therapy Daily Treatment Note     Name: Sha Triplett  Clinic Number: [id number]    Therapy Diagnosis:   Encounter Diagnoses   Name Primary?    Decreased ROM of intervertebral discs of thoracic spine     Thoracic spine pain     Decreased strength of trunk and back      Physician: Jude Kee MD    Visit Date: 9/17/2019    Physician Orders: PT Eval and Treat   Medical Diagnosis from Referral:   S22.009D (ICD-10-CM) - Closed fracture of transverse process of thoracic vertebra with routine healing, subsequent encounter   W17.89XA (ICD-10-CM) - Fall from height of greater than 3 feet   Evaluation Date: 8/8/2019  Authorization Period Expiration: 12/31/2019  Plan of Care Expiration: 11/8/2019  Visit # / Visits authorized: 8/20    Time In: 9:00a  Time Out: 9:53a  Total Billable Time: 30 minutes    Precautions: Standard    Subjective     Pt reports: he was trying to paint and do some housework and he noticed some increased pain in his back and so he tried to slow down. This happened yesterday, and today he feels somewhat better.   He was compliant with home exercise program.  Response to previous treatment: good   Functional change: no change     Pain: 4/10  Location: bilateral thoracic    Objective     Sha received therapeutic exercises to develop strength, endurance, ROM, flexibility, posture and core stabilization for 53 minutes including:    UBE 3'/3' Resist 3  Pulleys Abd/flex x2 min ea     Shld Row c/ GTB 3x10  Shld Ext c/ RTB 3x10  [place] and [place] c/ YTB 15x ea  Palloff Press c/ RTB 20x ea  Wall Squats with ball 3x10   Shuttle Squat 2-cord 3x10  Y's with Lift-off wall 2x10  Squat taps with shoulder press 5# DB, 2x10   Crate pushing 2x down turf     Wall push ups 2x10   Supine Chest Press with 5# db 3x10  LTR with contra UE reach 15x ea  DKTC with BUE reach 15x  Deadbug 2x10  Bridges 3x10  Prone Alt UE/LE ext, 2x10 ea  Supine Hor Abd c/ YTB, x30  Supine shoulder flex c/ YTB x20     Sha received  the following manual therapy techniques: Joint mobilizations and Soft tissue Mobilization were applied to the: thoracic/lumbar spine for 00 minutes, including:    Home Exercises Provided and Patient Education Provided     Education provided:   - new exercises given     Written Home Exercises Provided: yes.  Exercises were reviewed and Sha was able to demonstrate them prior to the end of the session.  Sha demonstrated good  understanding of the education provided.     See EMR under Patient Instructions for exercises provided 9/17/2019.    Assessment     Pt tolerated session well. He continues with mild difficulty with resisted trunk rotation. Pt instructed to modify his work activities if pain is increased and persists. He would continue to benefit from postural training and trunk strengthening. Continue with POC.     Sha is progressing well towards his goals.   Pt prognosis is Good.     Pt will continue to benefit from skilled outpatient physical therapy to address the deficits listed in the problem list box on initial evaluation, provide pt/family education and to maximize pt's level of independence in the home and community environment.     Pt's spiritual, cultural and educational needs considered and pt agreeable to plan of care and goals.     Anticipated barriers to physical therapy: None    Goals:  Short Term Goals: 4 weeks      1. Pt will be independent with HEP in order to demonstrate self management. Goal Met 9/4/2019  2. Pt will report a decrease in pain at its worse to 6/10 in order to improve quality of life.  Goal Met 9/4/2019  3. Pt will increase thoracic ROM to < or = to minimal limitation to decrease pain and increase functional mobility. Goal Met 9/4/2019  4. Pt will increase BLE and BUE MMT by 1/3 muscle grade in order to demonstrate increased strength.  Goal Met 9/4/2019   5. Pt will be able to perform his usual work, housework activities with no limitation.  In progress, ongoing     Long  Term Goals: 8 weeks      1. Pt will be independent with updated HEP in order to demonstrate self management.   2. Pt will report a decrease in pain at its worse to 3/10 in order to improve quality of life.   3. Pt will increase thoracic ROM to no limitation with minimal to no increase in pain in order to improve functional mobility.   4. Pt will increase BLE and BUE MMT by 1 muscle grade to improve strength and endurance to perform daily activities.   5. Pt will have a FOTO limitation score of < or = to 21% to demonstrate improved functional mobility.   6. Pt will be able to perform his usual hobbies and recreational activities as well as moderate household work with < or = to minimal limitation.     Plan     Continue progressing core and LE/UE strengthening    Ashley Dave, PT   09/17/2019

## 2019-11-18 ENCOUNTER — TELEPHONE (OUTPATIENT)
Dept: UROLOGY | Facility: CLINIC | Age: 69
End: 2019-11-18

## 2019-11-18 DIAGNOSIS — C61 PROSTATE CANCER: Primary | ICD-10-CM

## 2019-11-18 RX ORDER — CIPROFLOXACIN 500 MG/1
500 TABLET ORAL 2 TIMES DAILY
Qty: 6 TABLET | Refills: 0 | Status: SHIPPED | OUTPATIENT
Start: 2019-11-18 | End: 2019-11-21

## 2019-11-25 ENCOUNTER — PROCEDURE VISIT (OUTPATIENT)
Dept: UROLOGY | Facility: CLINIC | Age: 69
End: 2019-11-25
Attending: UROLOGY
Payer: MEDICARE

## 2019-11-25 DIAGNOSIS — C61 PROSTATE CANCER: Primary | ICD-10-CM

## 2019-11-25 PROCEDURE — 88305 TISSUE EXAM BY PATHOLOGIST: CPT | Mod: HCNC | Performed by: PATHOLOGY

## 2019-11-25 PROCEDURE — 88305 TISSUE EXAM BY PATHOLOGIST: ICD-10-PCS | Mod: 26,HCNC,, | Performed by: PATHOLOGY

## 2019-11-25 PROCEDURE — 88305 TISSUE EXAM BY PATHOLOGIST: CPT | Mod: 26,HCNC,, | Performed by: PATHOLOGY

## 2019-11-25 PROCEDURE — 76872 TRUS: ICD-10-PCS | Mod: 26,HCNC,S$GLB, | Performed by: UROLOGY

## 2019-11-25 PROCEDURE — 55700 TRUS: CPT | Mod: HCNC,S$GLB,, | Performed by: UROLOGY

## 2019-11-25 PROCEDURE — 76872 US TRANSRECTAL: CPT | Mod: 26,HCNC,S$GLB, | Performed by: UROLOGY

## 2019-11-25 PROCEDURE — 55700 TRUS: ICD-10-PCS | Mod: HCNC,S$GLB,, | Performed by: UROLOGY

## 2019-11-25 RX ORDER — GENTAMICIN SULFATE 40 MG/ML
80 INJECTION, SOLUTION INTRAMUSCULAR; INTRAVENOUS
Status: DISCONTINUED | OUTPATIENT
Start: 2019-11-25 | End: 2019-12-18

## 2019-11-25 NOTE — PROCEDURES
"TRUS  Date/Time: 11/25/2019 3:00 PM  Performed by: HARRIET Vazquez MD  Authorized by: HARRIET Vazquez MD     Consent Done?:  Yes (Written)  Time out: Immediately prior to procedure a "time out" was called to verify the correct patient, procedure, equipment, support staff and site/side marked as required.    Indications: Prostate Cancer    Preparation: Patient was prepped and draped in usual sterile fashion    Position:  Left lateral  Anesthesia:  10cc's 1% Lidocaine  Patient sedated: No    Prostate Size:  40  Lesions:: No    Left Base Biopsies: 2  Left Mid Biopsies: 2  Left [place] Biopsies: 2  Right Base Biopsies: 2  Right Mid Biopsies: 2  Right [place] Biopsies: 2  Transitional zone: No    Total Biopsies:  12    Patient tolerance:  Patient tolerated the procedure well with no immediate complications      "

## 2019-12-04 LAB
FINAL PATHOLOGIC DIAGNOSIS: NORMAL
GROSS: NORMAL

## 2019-12-16 ENCOUNTER — OFFICE VISIT (OUTPATIENT)
Dept: UROLOGY | Facility: CLINIC | Age: 69
End: 2019-12-16
Payer: MEDICARE

## 2019-12-16 VITALS — BODY MASS INDEX: 25.71 KG/M2 | HEIGHT: 71 IN | WEIGHT: 183.63 LBS

## 2019-12-16 DIAGNOSIS — C61 PROSTATE CANCER: Primary | ICD-10-CM

## 2019-12-16 DIAGNOSIS — N52.9 ED (ERECTILE DYSFUNCTION) OF ORGANIC ORIGIN: ICD-10-CM

## 2019-12-16 DIAGNOSIS — R35.1 NOCTURIA: ICD-10-CM

## 2019-12-16 PROCEDURE — 1159F PR MEDICATION LIST DOCUMENTED IN MEDICAL RECORD: ICD-10-PCS | Mod: HCNC,S$GLB,, | Performed by: UROLOGY

## 2019-12-16 PROCEDURE — 99214 OFFICE O/P EST MOD 30 MIN: CPT | Mod: HCNC,S$GLB,, | Performed by: UROLOGY

## 2019-12-16 PROCEDURE — 1126F AMNT PAIN NOTED NONE PRSNT: CPT | Mod: HCNC,S$GLB,, | Performed by: UROLOGY

## 2019-12-16 PROCEDURE — 99999 PR PBB SHADOW E&M-EST. PATIENT-LVL III: ICD-10-PCS | Mod: PBBFAC,HCNC,, | Performed by: UROLOGY

## 2019-12-16 PROCEDURE — 99999 PR PBB SHADOW E&M-EST. PATIENT-LVL III: CPT | Mod: PBBFAC,HCNC,, | Performed by: UROLOGY

## 2019-12-16 PROCEDURE — 1159F MED LIST DOCD IN RCRD: CPT | Mod: HCNC,S$GLB,, | Performed by: UROLOGY

## 2019-12-16 PROCEDURE — 1126F PR PAIN SEVERITY QUANTIFIED, NO PAIN PRESENT: ICD-10-PCS | Mod: HCNC,S$GLB,, | Performed by: UROLOGY

## 2019-12-16 PROCEDURE — 1101F PT FALLS ASSESS-DOCD LE1/YR: CPT | Mod: HCNC,CPTII,S$GLB, | Performed by: UROLOGY

## 2019-12-16 PROCEDURE — 99214 PR OFFICE/OUTPT VISIT, EST, LEVL IV, 30-39 MIN: ICD-10-PCS | Mod: HCNC,S$GLB,, | Performed by: UROLOGY

## 2019-12-16 PROCEDURE — 1101F PR PT FALLS ASSESS DOC 0-1 FALLS W/OUT INJ PAST YR: ICD-10-PCS | Mod: HCNC,CPTII,S$GLB, | Performed by: UROLOGY

## 2019-12-16 NOTE — PROGRESS NOTES
Subjective:       Patient ID: Sha Triplett is a 69 y.o. male who was last seen in this office 11/25/2019    Chief Complaint:   Chief Complaint   Patient presents with    Post-op Evaluation     post op from biopsy        Follow Up Prostate Biopsy    He underwent a prostate needle biopsy on 11/25/2019.  His biopsy was indicated due to: Prostate Cancer.  Afterwards he experienced: Gross Hematuria and Blood in stool.  These symptoms have resolved.  His PSA prior to biopsy was 6.3.  His prostate size was 40 grams.  The ultrasound did not show a median lobe.  He currently does have erectile dysfunction.  His pathology showed: prostate cancer.        ACTIVE MEDICAL ISSUES:  Patient Active Problem List   Diagnosis    Hypertension    Chronic hepatitis C without hepatic coma    Other symptoms involving digestive system(957.75)    Hematospermia    BPH with obstruction/lower urinary tract symptoms    Nocturia    Prostate cancer    Muscle spasm    Decreased ROM of intervertebral discs of thoracic spine    Thoracic spine pain    Decreased strength of trunk and back       ALLERGIES AND MEDICATIONS: updated and reviewed.  Review of patient's allergies indicates:  No Known Allergies  Current Outpatient Medications   Medication Sig    b complex vitamins tablet Take 1 tablet by mouth once daily.    cyanocobalamin, vitamin B-12, (VITAMIN B-12) 50 mcg tablet Take 50 mcg by mouth once daily.    diphenhydrAMINE (BENADRYL) 50 MG tablet Take 50 mg by mouth nightly as needed for Itching.    docusate sodium (COLACE) 100 MG capsule Take 1 capsule (100 mg total) by mouth 2 (two) times daily as needed for Constipation.    ergocalciferol (DRISDOL) 8,000 unit/mL Drop Take 0.25 mLs (2,000 Units total) by mouth once daily.    fish oil-omega-3 fatty acids 300-1,000 mg capsule Take 2 g by mouth once daily.    gabapentin (NEURONTIN) 100 MG capsule Take 300 mg by mouth.    ibuprofen (ADVIL,MOTRIN) 800 MG tablet Take 1 tablet  "(800 mg total) by mouth every 6 (six) hours as needed for Pain.    milk thistle 175 mg tablet Take 175 mg by mouth once daily.    multivitamin capsule Take 1 capsule by mouth once daily.    PSYLLIUM SEED, WITH DEXTROSE, (CILIUM ORAL) Take by mouth.    tiZANidine (ZANAFLEX) 4 MG tablet Take 1 tablet (4 mg total) by mouth every 8 (eight) hours.     Current Facility-Administered Medications   Medication    gentamicin injection 80 mg    gentamicin injection 80 mg       Review of Systems   Constitutional: Negative for activity change, fatigue, fever and unexpected weight change.   HENT: Negative for congestion.    Eyes: Negative for redness.   Respiratory: Negative for chest tightness and shortness of breath.    Cardiovascular: Negative for chest pain and leg swelling.   Gastrointestinal: Negative for abdominal pain, constipation, diarrhea, nausea and vomiting.   Genitourinary: Negative for dysuria, flank pain, frequency, hematuria, penile pain, penile swelling, scrotal swelling, testicular pain and urgency.   Musculoskeletal: Negative for arthralgias and back pain.   Neurological: Negative for dizziness and light-headedness.   Psychiatric/Behavioral: Negative for behavioral problems and confusion. The patient is not nervous/anxious.    All other systems reviewed and are negative.      Objective:      Vitals:    12/16/19 1559   Weight: 83.3 kg (183 lb 10.3 oz)   Height: 5' 11" (1.803 m)     Physical Exam   Nursing note and vitals reviewed.  Constitutional: He is oriented to person, place, and time. He appears well-developed and well-nourished.   HENT:   Head: Normocephalic.   Eyes: Conjunctivae are normal.   Neck: Normal range of motion. Neck supple. No tracheal deviation present. No thyromegaly present.   Cardiovascular: Normal rate and normal heart sounds.    Pulmonary/Chest: Effort normal and breath sounds normal. No respiratory distress. He has no wheezes.   Abdominal: Soft. Bowel sounds are normal. There is no " hepatosplenomegaly. There is no tenderness. There is no rebound and no CVA tenderness. No hernia.   Musculoskeletal: Normal range of motion. He exhibits no edema or tenderness.   Lymphadenopathy:     He has no cervical adenopathy.   Neurological: He is alert and oriented to person, place, and time.   Skin: Skin is warm and dry. No rash noted. No erythema.     Psychiatric: He has a normal mood and affect. His behavior is normal. Judgment and thought content normal.       Urine dipstick shows negative for all components.  Micro exam: negative for WBC's or RBC's.     3wk ago   Final Pathologic Diagnosis Part 1   Prostate needle biopsy (1, submitted as left base lateral):   -No evidence of malignancy     Part 2   Prostate needle biopsy (1, submitted as left base medial):   -No evidence of malignancy     Part 3   Prostate needle biopsy (1, submitted as left mid lateral):   -No evidence of malignancy     Part 4   Prostate needle biopsy (1, submitted as left mid medial):   -No evidence of malignancy     Part 5   Prostate needle biopsy (1, submitted as left apex lateral):   -No evidence of malignancy     Part 6   Prostate needle biopsy (1, submitted as left apex medial):   -No evidence of malignancy     Part 7   Prostate needle biopsy (1, submitted as right base lateral):   -No evidence of malignancy   -Chronic inflammation     Part 8   Prostate needle biopsy (1, submitted as right base medial):   -No evidence of malignancy     Part 9   Prostate needle biopsy (1, submitted as right mid lateral):   -Adenocarcinoma, [place] patterns 4-3, pattern score 7, group 3, occupying 5%   of a 14 mm biopsy     Part 10   Prostate needle biopsy (1, submitted as right mid medial):   -Adenocarcinoma, [place] patterns 4-3, pattern score 7, group 3, occupying 1%   of a 13 mm biopsy     Part 11   Prostate needle biopsy (1, submitted as right apex lateral):   -Adenocarcinoma, [place] patterns 4-3, pattern score 7, group 3, occupying 2%   of a  12 mm biopsy     Part 12   Prostate needle biopsy (1, submitted as right apex medial):   -Adenocarcinoma, [place] patterns 4-3, pattern score 7, group 3, occupying 2%   of a 13 mm biopsy          Assessment:       1. Prostate cancer    2. Nocturia    3. ED (erectile dysfunction) of organic origin          Plan:       1. Prostate cancer  He has more cores positive and are higher grade.  I recommend switching to radiation or surgery. Packet given.    2. Nocturia  Limit evening fluids    3. ED (erectile dysfunction) of organic origin  Viagra            Follow up in about 2 weeks (around 12/30/2019) for Follow up.

## 2019-12-18 ENCOUNTER — OFFICE VISIT (OUTPATIENT)
Dept: FAMILY MEDICINE | Facility: CLINIC | Age: 69
End: 2019-12-18
Payer: MEDICARE

## 2019-12-18 VITALS
TEMPERATURE: 98 F | BODY MASS INDEX: 25.94 KG/M2 | DIASTOLIC BLOOD PRESSURE: 60 MMHG | HEIGHT: 71 IN | WEIGHT: 185.31 LBS | OXYGEN SATURATION: 98 % | SYSTOLIC BLOOD PRESSURE: 119 MMHG | HEART RATE: 71 BPM

## 2019-12-18 DIAGNOSIS — Z00.00 ANNUAL PHYSICAL EXAM: Primary | ICD-10-CM

## 2019-12-18 DIAGNOSIS — N52.8 OTHER MALE ERECTILE DYSFUNCTION: ICD-10-CM

## 2019-12-18 DIAGNOSIS — N13.8 BPH WITH OBSTRUCTION/LOWER URINARY TRACT SYMPTOMS: ICD-10-CM

## 2019-12-18 DIAGNOSIS — H92.01 DISCOMFORT OF RIGHT EAR: ICD-10-CM

## 2019-12-18 DIAGNOSIS — N40.1 BPH WITH OBSTRUCTION/LOWER URINARY TRACT SYMPTOMS: ICD-10-CM

## 2019-12-18 DIAGNOSIS — E66.3 OVERWEIGHT (BMI 25.0-29.9): ICD-10-CM

## 2019-12-18 DIAGNOSIS — I10 ESSENTIAL HYPERTENSION: ICD-10-CM

## 2019-12-18 DIAGNOSIS — C61 PROSTATE CANCER: ICD-10-CM

## 2019-12-18 DIAGNOSIS — B18.2 CHRONIC HEPATITIS C WITHOUT HEPATIC COMA: ICD-10-CM

## 2019-12-18 PROCEDURE — 99397 PR PREVENTIVE VISIT,EST,65 & OVER: ICD-10-PCS | Mod: HCNC,S$GLB,, | Performed by: FAMILY MEDICINE

## 2019-12-18 PROCEDURE — 1126F AMNT PAIN NOTED NONE PRSNT: CPT | Mod: HCNC,S$GLB,, | Performed by: FAMILY MEDICINE

## 2019-12-18 PROCEDURE — 99999 PR PBB SHADOW E&M-EST. PATIENT-LVL III: CPT | Mod: PBBFAC,HCNC,, | Performed by: FAMILY MEDICINE

## 2019-12-18 PROCEDURE — 3074F PR MOST RECENT SYSTOLIC BLOOD PRESSURE < 130 MM HG: ICD-10-PCS | Mod: HCNC,CPTII,S$GLB, | Performed by: FAMILY MEDICINE

## 2019-12-18 PROCEDURE — 99999 PR PBB SHADOW E&M-EST. PATIENT-LVL III: ICD-10-PCS | Mod: PBBFAC,HCNC,, | Performed by: FAMILY MEDICINE

## 2019-12-18 PROCEDURE — 3078F PR MOST RECENT DIASTOLIC BLOOD PRESSURE < 80 MM HG: ICD-10-PCS | Mod: HCNC,CPTII,S$GLB, | Performed by: FAMILY MEDICINE

## 2019-12-18 PROCEDURE — 1126F PR PAIN SEVERITY QUANTIFIED, NO PAIN PRESENT: ICD-10-PCS | Mod: HCNC,S$GLB,, | Performed by: FAMILY MEDICINE

## 2019-12-18 PROCEDURE — 1101F PT FALLS ASSESS-DOCD LE1/YR: CPT | Mod: HCNC,CPTII,S$GLB, | Performed by: FAMILY MEDICINE

## 2019-12-18 PROCEDURE — 1159F MED LIST DOCD IN RCRD: CPT | Mod: HCNC,S$GLB,, | Performed by: FAMILY MEDICINE

## 2019-12-18 PROCEDURE — 1101F PR PT FALLS ASSESS DOC 0-1 FALLS W/OUT INJ PAST YR: ICD-10-PCS | Mod: HCNC,CPTII,S$GLB, | Performed by: FAMILY MEDICINE

## 2019-12-18 PROCEDURE — 3074F SYST BP LT 130 MM HG: CPT | Mod: HCNC,CPTII,S$GLB, | Performed by: FAMILY MEDICINE

## 2019-12-18 PROCEDURE — 1159F PR MEDICATION LIST DOCUMENTED IN MEDICAL RECORD: ICD-10-PCS | Mod: HCNC,S$GLB,, | Performed by: FAMILY MEDICINE

## 2019-12-18 PROCEDURE — 99397 PER PM REEVAL EST PAT 65+ YR: CPT | Mod: HCNC,S$GLB,, | Performed by: FAMILY MEDICINE

## 2019-12-18 PROCEDURE — 99214 OFFICE O/P EST MOD 30 MIN: CPT | Mod: HCNC,25,S$GLB, | Performed by: FAMILY MEDICINE

## 2019-12-18 PROCEDURE — 3078F DIAST BP <80 MM HG: CPT | Mod: HCNC,CPTII,S$GLB, | Performed by: FAMILY MEDICINE

## 2019-12-18 PROCEDURE — 99214 PR OFFICE/OUTPT VISIT, EST, LEVL IV, 30-39 MIN: ICD-10-PCS | Mod: HCNC,25,S$GLB, | Performed by: FAMILY MEDICINE

## 2019-12-18 NOTE — PROGRESS NOTES
Office Visit    Patient Name: Sha Triplett    : 1950  MRN: [id number]      Assessment/Plan:  Sha Triplett is a 69 y.o. male who presents today for :    Annual physical exam  -     Hemoglobin A1c; Future; Expected date: 2019  -     CBC Without Differential; Future; Expected date: 2019  -     Comprehensive metabolic panel; Future; Expected date: 2019  Overweight (BMI 25.0-29.9)  -anticipatory guidance provided with age appropriate preventative services discussed, healthy diet and regular physical exercise also discussed with patient  -any additional health maintenance will be readdressed at the next physical if declined or deferred by the patient today   -Recommend 15-30 minutes of moderate intensity exercise 5 days/week.                  Follow up for any evaluation as needed.          Additional Evaluation & Management issues:     In addition to today's Annual Physical, patient has other medical issues that need to be addressed, as well as their associated prescription management that is separate from today's Physical  - as documented separately below the Annual Physical portion of this encounter.        This note was created by combination of typed  and MModal dictation.  Transcription errors may be present.  If there are any questions, please contact me.        ----------------------------------------------------------------------------------------------------------------------      HPI:  Patient Care Team:  Jude Kee MD as PCP - General (Family Medicine)  HARRIET Vazquez MD as Consulting Physician (Urology)  Filiberto Varela OD as Consulting Physician (Optometry)  Eli Caicedo MA as Care Coordinator    Sha is a 69 y.o. male with      Patient Active Problem List   Diagnosis    Hypertension - diet controlled    BPH with obstruction/LUTS - declined Flomax    BPH - stable off of Flomax    Nocturia    Prostate cancer    Decreased ROM of intervertebral discs  of thoracic spine    Thoracic spine pain    Overweight (BMI 25.0-29.9)    Other male erectile dysfunction - controlled on PRN Viagra       Patient presents today for:  Otalgia (RT EAR ITCHY ) and Follow-up      In addition to addressing the reasons for this office visit as above, which is further discussed and addressed in the separate E&M section of this note, patient also due for annual bloodwork today.  Health maintenance-wise, he is up to date on colon cancer screening.  He had recent Dx of prostate cancer about a year ago, has been following up with Urology about his treatment options with recent Bx showing higher grade and is in discussion about his next treatment option of switching either to radiation vs surgery. Otherwise, no major new changes in health since last checkup. He denies any cardiovascular or neurologic complaints today        Additional ROS  No F/C/wt changes/fatigue  No dysphagia/sore throat/rhinorrhea  No CP/HAINES/palpitations/swelling  No cough/wheezing/SOB  No nausea/vomiting/abd pain/no diarrhea, no constipation, no blood in stool  No muscle aches/joint pain   No rashes  No MSK weakness/HA/tingling/numbness  No anxiety/depression  No dysuria/hematuria  No polyuria/polydipsia/cold or hot intolerance          Current Medications  Medications reviewed and updated.       Current Outpatient Medications:     b complex vitamins tablet, Take 1 tablet by mouth once daily., Disp: , Rfl:     cyanocobalamin, vitamin B-12, (VITAMIN B-12) 50 mcg tablet, Take 50 mcg by mouth once daily., Disp: , Rfl:     diphenhydrAMINE (BENADRYL) 50 MG tablet, Take 50 mg by mouth nightly as needed for Itching., Disp: , Rfl:     docusate sodium (COLACE) 100 MG capsule, Take 1 capsule (100 mg total) by mouth 2 (two) times daily as needed for Constipation. (Patient not taking: Reported on 12/18/2019), Disp: 180 capsule, Rfl: 2    ergocalciferol (DRISDOL) 8,000 unit/mL Drop, Take 0.25 mLs (2,000 Units total) by mouth  once daily. (Patient not taking: Reported on 12/18/2019), Disp: 60 mL, Rfl: 3    fish oil-omega-3 fatty acids 300-1,000 mg capsule, Take 2 g by mouth once daily., Disp: , Rfl:     gabapentin (NEURONTIN) 100 MG capsule, Take 300 mg by mouth., Disp: , Rfl:     milk thistle 175 mg tablet, Take 175 mg by mouth once daily., Disp: , Rfl:     multivitamin capsule, Take 1 capsule by mouth once daily., Disp: , Rfl:     PSYLLIUM SEED, WITH DEXTROSE, (CILIUM ORAL), Take by mouth., Disp: , Rfl:     tiZANidine (ZANAFLEX) 4 MG tablet, Take 1 tablet (4 mg total) by mouth every 8 (eight) hours. (Patient not taking: Reported on 12/18/2019), Disp: 90 tablet, Rfl: 1    Past Surgical History:   Procedure Laterality Date    COLONOSCOPY W/ BIOPSIES  3/4/15    / Dr. [facility] at [facility].    LIVER BIOPSY  1998    trus/bx 2018      trus/bx 2019         Family History   Problem Relation Age of Onset    Alzheimer's disease Mother     Macular degeneration Father        Social History     Socioeconomic History    Marital status:      Spouse name: Not on file    Number of children: Not on file    Years of education: Not on file    Highest education level: Not on file   Occupational History    Not on file   Social Needs    Financial resource strain: Not on file    Food insecurity:     Worry: Not on file     Inability: Not on file    Transportation needs:     Medical: Not on file     Non-medical: Not on file   Tobacco Use    Smoking status: Former Smoker     Packs/day: 0.00    Smokeless tobacco: Never Used   Substance and Sexual Activity    Alcohol use: No    Drug use: No    Sexual activity: Not Currently   Lifestyle    Physical activity:     Days per week: Not on file     Minutes per session: Not on file    Stress: Not on file   Relationships    Social connections:     Talks on phone: Not on file     Gets together: Not on file     Attends [employer] service: Not on file     Active member of club or organization: Not  "on file     Attends meetings of clubs or organizations: Not on file     Relationship status: Not on file   Other Topics Concern    Not on file   Social History Narrative     x 2.  Two bio kids. One adopted.   Retired [employer].  Now a non smoker.  [employer] .             Allergies   Review of patient's allergies indicates:  No Known Allergies          Review of Systems  See HPI      Physical Exam  /60   Pulse 71   Temp 97.6 °F (36.4 °C)   Ht 5' 11" (1.803 m)   Wt 84.1 kg (185 lb 4.8 oz)   SpO2 98%   BMI 25.84 kg/m²       GEN: NAD, well developed, pleasant, well nourished  HEENT: NCAT, PERRLA, EOMI, sclera clear, anicteric, bilateral ear exam wnl except for dry skin of R ear canacl, O/P clear, MMM with no lesions  NECK: normal, supple with midline trachea, no LAD, no thyromegaly  LUNGS: CTAB, no w/r/r, no increased work of breathing   HEART: RRR, normal S1 and S2, no m/r/g, no edema  ABD: s/nt/nd, NABS  SKIN: normal turgor, no rashes  PSYCH: AOx3, appropriate mood and affect  MSK: warm/well perfused, normal ROM in all extremities, no c/c/e.  NEURO: normal without focal findings, CN II-XII are grossly intact.  Sensation/strength grossly normal, gait and station normal.         Labs  No results found for: LABA1C, HGBA1C  Lab Results   Component Value Date     03/27/2018    K 4.5 03/27/2018     03/27/2018    CO2 28 03/27/2018    BUN 15 03/27/2018    CREATININE 0.9 07/21/2019    CALCIUM 10.1 03/27/2018    ANIONGAP 10 03/27/2018    ESTGFRAFRICA >60 07/21/2019    EGFRNONAA >60 07/21/2019     Lab Results   Component Value Date    CHOL 168 05/04/2019     Lab Results   Component Value Date    HDL 65 05/04/2019     Lab Results   Component Value Date    LDLCALC 91.0 05/04/2019     Lab Results   Component Value Date    TRIG 60 05/04/2019     Lab Results   Component Value Date    CHOLHDL 38.7 05/04/2019     Last set of blood work has been reviewed as noted " "above.          __________________________________________________________________________________________________________________________________      Additional Evaluation & Management issues:     In addition to today's Annual Physical, patient has other medical issues that need to be addressed, as well as their associated prescription management that is separate from today's Physical  - as documented separately below      HPI:    Patient presents today for:  Otalgia (RT EAR ITCHY ) and Follow-up    Right ear itchy the past week - no pain nor drainage, no URI Sx. No decreased hearing.    HTN - diet-controlled - good BP readings at home - no side effects. Denies CP/SOB/leg swelling    +Prosate CA with recent prostate Bx 11/25/19 showing more positive cores that are higher grade. No urinary Sx. He is in discussion w/ Urology regarding the next treatment plan of radiation vs surgery.     Chronic hepatitis C - stable, will f/u with HEP. No abd pain/jaundice/changes in BM.        Additional ROS  No F/C/wt changes/fatigue  No CP/HAINES/palpitations/swelling  No cough/wheezing/SOB  No nausea/vomiting/abd pain  No muscle aches/joint pain   No rashes  No MSK weakness/HA/tingling/numbness              Review of Systems  See HPI        Physical Exam  /60   Pulse 71   Temp 97.6 °F (36.4 °C)   Ht 5' 11" (1.803 m)   Wt 84.1 kg (185 lb 4.8 oz)   SpO2 98%   BMI 25.84 kg/m²       GEN: NAD, well developed, pleasant, well nourished  HEENT: NCAT, PERRLA, EOMI, sclera clear, anicteric, bilateral ear exam wnl except for dry skin of R ear canacl, O/P clear, MMM with no lesions  NECK: normal, supple with midline trachea, no LAD, no thyromegaly  LUNGS: CTAB, no w/r/r, no increased work of breathing   HEART: RRR, normal S1 and S2, no m/r/g, no edema  ABD: s/nt/nd, NABS  SKIN: normal turgor, no rashes  PSYCH: AOx3, appropriate mood and affect  MSK: warm/well perfused, normal ROM in all extremities, no " c/c/e.                      Assessment/Plan:  Sha Triplett is a 69 y.o. male who presents today for :      Discomfort of right ear  -reassured pt that he has normal ear exam, no infection. Advised to use moisturizing lotion for dry skin around the ear    Essential hypertension  -diet-controlled  -DASH diet, regular cardiovascular exercises    BPH - stable off of Flomax    Prostate cancer - s/p Prostate Bx 11/25/19 - follows Urology - in discussion about treatment plan radiation vs surgery    Chronic hepatitis C - genotype 1a - Abd U/S 4/2017 normal - follows HEP  -     CBC Without Differential; Future; Expected date: 12/19/2019  -stable, f/u Urology    Other male erectile dysfunction   - controlled on PRN Viagra                  Follow up PRN.

## 2019-12-19 PROBLEM — N52.8 OTHER MALE ERECTILE DYSFUNCTION: Status: ACTIVE | Noted: 2019-12-19

## 2019-12-19 PROBLEM — E66.3 OVERWEIGHT (BMI 25.0-29.9): Status: ACTIVE | Noted: 2019-12-19

## 2020-01-08 ENCOUNTER — OFFICE VISIT (OUTPATIENT)
Dept: UROLOGY | Facility: CLINIC | Age: 70
End: 2020-01-08
Payer: MEDICARE

## 2020-01-08 VITALS — BODY MASS INDEX: 25.99 KG/M2 | WEIGHT: 185.63 LBS | HEIGHT: 71 IN

## 2020-01-08 DIAGNOSIS — R33.9 INCOMPLETE EMPTYING OF BLADDER: ICD-10-CM

## 2020-01-08 DIAGNOSIS — R35.1 NOCTURIA: ICD-10-CM

## 2020-01-08 DIAGNOSIS — C61 PROSTATE CANCER: Primary | ICD-10-CM

## 2020-01-08 DIAGNOSIS — N52.9 ED (ERECTILE DYSFUNCTION) OF ORGANIC ORIGIN: ICD-10-CM

## 2020-01-08 LAB
BILIRUB SERPL-MCNC: NORMAL MG/DL
BLOOD URINE, POC: NORMAL
COLOR, POC UA: YELLOW
GLUCOSE UR QL STRIP: NORMAL
KETONES UR QL STRIP: NORMAL
LEUKOCYTE ESTERASE URINE, POC: NORMAL
NITRITE, POC UA: NORMAL
PH, POC UA: 5
PROTEIN, POC: NORMAL
SPECIFIC GRAVITY, POC UA: 1000
UROBILINOGEN, POC UA: NORMAL

## 2020-01-08 PROCEDURE — 1159F PR MEDICATION LIST DOCUMENTED IN MEDICAL RECORD: ICD-10-PCS | Mod: HCNC,S$GLB,, | Performed by: UROLOGY

## 2020-01-08 PROCEDURE — 1126F AMNT PAIN NOTED NONE PRSNT: CPT | Mod: HCNC,S$GLB,, | Performed by: UROLOGY

## 2020-01-08 PROCEDURE — 99214 OFFICE O/P EST MOD 30 MIN: CPT | Mod: HCNC,25,S$GLB, | Performed by: UROLOGY

## 2020-01-08 PROCEDURE — 1101F PT FALLS ASSESS-DOCD LE1/YR: CPT | Mod: HCNC,CPTII,S$GLB, | Performed by: UROLOGY

## 2020-01-08 PROCEDURE — 99999 PR PBB SHADOW E&M-EST. PATIENT-LVL III: ICD-10-PCS | Mod: PBBFAC,HCNC,, | Performed by: UROLOGY

## 2020-01-08 PROCEDURE — 99999 PR PBB SHADOW E&M-EST. PATIENT-LVL III: CPT | Mod: PBBFAC,HCNC,, | Performed by: UROLOGY

## 2020-01-08 PROCEDURE — 1101F PR PT FALLS ASSESS DOC 0-1 FALLS W/OUT INJ PAST YR: ICD-10-PCS | Mod: HCNC,CPTII,S$GLB, | Performed by: UROLOGY

## 2020-01-08 PROCEDURE — 1126F PR PAIN SEVERITY QUANTIFIED, NO PAIN PRESENT: ICD-10-PCS | Mod: HCNC,S$GLB,, | Performed by: UROLOGY

## 2020-01-08 PROCEDURE — 81001 URINALYSIS AUTO W/SCOPE: CPT | Mod: HCNC,S$GLB,, | Performed by: UROLOGY

## 2020-01-08 PROCEDURE — 81001 POCT URINALYSIS, DIPSTICK OR TABLET REAGENT, AUTOMATED, WITH MICROSCOP: ICD-10-PCS | Mod: HCNC,S$GLB,, | Performed by: UROLOGY

## 2020-01-08 PROCEDURE — 99214 PR OFFICE/OUTPT VISIT, EST, LEVL IV, 30-39 MIN: ICD-10-PCS | Mod: HCNC,25,S$GLB, | Performed by: UROLOGY

## 2020-01-08 PROCEDURE — 1159F MED LIST DOCD IN RCRD: CPT | Mod: HCNC,S$GLB,, | Performed by: UROLOGY

## 2020-01-08 NOTE — PROGRESS NOTES
Subjective:       Patient ID: Sha Triplett is a 69 y.o. male who was last seen in this office 12/16/2019    Chief Complaint:   Chief Complaint   Patient presents with    Prostate Cancer     2 week f/u to discuss treatment        Prostate Cancer    He underwent a prostate needle biopsy on 11/25/2019.  His biopsy was indicated due to: Prostate Cancer.  Afterwards he experienced: Gross Hematuria and Blood in stool.  These symptoms have resolved.  His PSA prior to biopsy was 6.3.  His prostate size was 40 grams.  The ultrasound did not show a median lobe.  He currently does have erectile dysfunction.  His pathology showed: prostate cancer.    ACTIVE MEDICAL ISSUES:  Patient Active Problem List   Diagnosis    Hypertension - diet controlled    BPH with obstruction/LUTS - declined Flomax    BPH - stable off of Flomax    Nocturia    Prostate cancer    Decreased ROM of intervertebral discs of thoracic spine    Thoracic spine pain    Overweight (BMI 25.0-29.9)    Other male erectile dysfunction - controlled on PRN Viagra       ALLERGIES AND MEDICATIONS: updated and reviewed.  Review of patient's allergies indicates:  No Known Allergies  Current Outpatient Medications   Medication Sig    b complex vitamins tablet Take 1 tablet by mouth once daily.    cyanocobalamin, vitamin B-12, (VITAMIN B-12) 50 mcg tablet Take 50 mcg by mouth once daily.    diphenhydrAMINE (BENADRYL) 50 MG tablet Take 50 mg by mouth nightly as needed for Itching.    docusate sodium (COLACE) 100 MG capsule Take 1 capsule (100 mg total) by mouth 2 (two) times daily as needed for Constipation.    ergocalciferol (DRISDOL) 8,000 unit/mL Drop Take 0.25 mLs (2,000 Units total) by mouth once daily.    fish oil-omega-3 fatty acids 300-1,000 mg capsule Take 2 g by mouth once daily.    gabapentin (NEURONTIN) 100 MG capsule Take 300 mg by mouth.    milk thistle 175 mg tablet Take 175 mg by mouth once daily.    multivitamin capsule Take 1 capsule  "by mouth once daily.    PSYLLIUM SEED, WITH DEXTROSE, (CILIUM ORAL) Take by mouth.    tiZANidine (ZANAFLEX) 4 MG tablet Take 1 tablet (4 mg total) by mouth every 8 (eight) hours.     No current facility-administered medications for this visit.        Review of Systems   Constitutional: Negative for activity change, fatigue, fever and unexpected weight change.   HENT: Negative for congestion.    Eyes: Negative for redness.   Respiratory: Negative for chest tightness and shortness of breath.    Cardiovascular: Negative for chest pain and leg swelling.   Gastrointestinal: Negative for abdominal pain, constipation, diarrhea, nausea and vomiting.   Genitourinary: Negative for dysuria, flank pain, frequency, hematuria, penile pain, penile swelling, scrotal swelling, testicular pain and urgency.   Musculoskeletal: Negative for arthralgias and back pain.   Neurological: Negative for dizziness and light-headedness.   Psychiatric/Behavioral: Negative for behavioral problems and confusion. The patient is not nervous/anxious.    All other systems reviewed and are negative.      Objective:      Vitals:    01/08/20 1423   Weight: 84.2 kg (185 lb 10 oz)   Height: 5' 11" (1.803 m)     Physical Exam   Nursing note and vitals reviewed.  Constitutional: He is oriented to person, place, and time. He appears well-developed and well-nourished.   HENT:   Head: Normocephalic.   Eyes: Conjunctivae are normal.   Neck: Normal range of motion. Neck supple. No tracheal deviation present. No thyromegaly present.   Cardiovascular: Normal rate and normal heart sounds.    Pulmonary/Chest: Effort normal and breath sounds normal. No respiratory distress. He has no wheezes.   Abdominal: Soft. Bowel sounds are normal. There is no hepatosplenomegaly. There is no tenderness. There is no rebound and no CVA tenderness. No hernia.   Musculoskeletal: Normal range of motion. He exhibits no edema or tenderness.   Lymphadenopathy:     He has no cervical " adenopathy.   Neurological: He is alert and oriented to person, place, and time.   Skin: Skin is warm and dry. No rash noted. No erythema.     Psychiatric: He has a normal mood and affect. His behavior is normal. Judgment and thought content normal.       Urine dipstick shows negative for all components.  Micro exam: negative for WBC's or RBC's.    Part 1   Prostate needle biopsy (1, submitted as left base lateral):   -No evidence of malignancy     Part 2   Prostate needle biopsy (1, submitted as left base medial):   -No evidence of malignancy     Part 3   Prostate needle biopsy (1, submitted as left mid lateral):   -No evidence of malignancy     Part 4   Prostate needle biopsy (1, submitted as left mid medial):   -No evidence of malignancy     Part 5   Prostate needle biopsy (1, submitted as left apex lateral):   -No evidence of malignancy     Part 6   Prostate needle biopsy (1, submitted as left apex medial):   -No evidence of malignancy     Part 7   Prostate needle biopsy (1, submitted as right base lateral):   -No evidence of malignancy   -Chronic inflammation     Part 8   Prostate needle biopsy (1, submitted as right base medial):   -No evidence of malignancy     Part 9   Prostate needle biopsy (1, submitted as right mid lateral):   -Adenocarcinoma, [place] patterns 4-3, pattern score 7, group 3, occupying 5%   of a 14 mm biopsy     Part 10   Prostate needle biopsy (1, submitted as right mid medial):   -Adenocarcinoma, [place] patterns 4-3, pattern score 7, group 3, occupying 1%   of a 13 mm biopsy     Part 11   Prostate needle biopsy (1, submitted as right apex lateral):   -Adenocarcinoma, [place] patterns 4-3, pattern score 7, group 3, occupying 2%   of a 12 mm biopsy     Part 12   Prostate needle biopsy (1, submitted as right apex medial):   -Adenocarcinoma, [place] patterns 4-3, pattern score 7, group 3, occupying 2%   of a 13 mm biopsy     Assessment:       1. Prostate cancer    2. Nocturia    3. ED  (erectile dysfunction) of organic origin    4. Incomplete emptying of bladder          Plan:       1. Prostate cancer  He wants to continue active surveillance for now.  He also wants to talk to Radiation Oncology.      His Anderson score is higher than last time and in more cores.  There is relatively low volume.    - POCT urinalysis, dipstick or tablet reag  - Ambulatory consult to Radiation Oncology  - Prostate Specific Antigen, Diagnostic; Future    2. Nocturia  Limit evening fluids    3. ED (erectile dysfunction) of organic origin  Viagra     4. Incomplete emptying of bladder  stable            Follow up in about 3 months (around 4/8/2020) for Follow up, Review PSA.

## 2020-01-14 ENCOUNTER — INITIAL CONSULT (OUTPATIENT)
Dept: RADIATION ONCOLOGY | Facility: CLINIC | Age: 70
End: 2020-01-14
Payer: MEDICARE

## 2020-01-14 VITALS
WEIGHT: 188.19 LBS | BODY MASS INDEX: 26.94 KG/M2 | TEMPERATURE: 98 F | RESPIRATION RATE: 20 BRPM | HEART RATE: 82 BPM | DIASTOLIC BLOOD PRESSURE: 70 MMHG | SYSTOLIC BLOOD PRESSURE: 137 MMHG | HEIGHT: 70 IN

## 2020-01-14 DIAGNOSIS — D49.59 NEOPLASM OF PROSTATE: ICD-10-CM

## 2020-01-14 DIAGNOSIS — C61 PROSTATE CANCER: Primary | ICD-10-CM

## 2020-01-14 PROCEDURE — 1126F AMNT PAIN NOTED NONE PRSNT: CPT | Mod: HCNC,S$GLB,, | Performed by: RADIOLOGY

## 2020-01-14 PROCEDURE — 3075F PR MOST RECENT SYSTOLIC BLOOD PRESS GE 130-139MM HG: ICD-10-PCS | Mod: HCNC,CPTII,S$GLB, | Performed by: RADIOLOGY

## 2020-01-14 PROCEDURE — 99204 PR OFFICE/OUTPT VISIT, NEW, LEVL IV, 45-59 MIN: ICD-10-PCS | Mod: HCNC,S$GLB,, | Performed by: RADIOLOGY

## 2020-01-14 PROCEDURE — 1126F PR PAIN SEVERITY QUANTIFIED, NO PAIN PRESENT: ICD-10-PCS | Mod: HCNC,S$GLB,, | Performed by: RADIOLOGY

## 2020-01-14 PROCEDURE — 3078F PR MOST RECENT DIASTOLIC BLOOD PRESSURE < 80 MM HG: ICD-10-PCS | Mod: HCNC,CPTII,S$GLB, | Performed by: RADIOLOGY

## 2020-01-14 PROCEDURE — 99999 PR PBB SHADOW E&M-EST. PATIENT-LVL III: ICD-10-PCS | Mod: PBBFAC,HCNC,, | Performed by: RADIOLOGY

## 2020-01-14 PROCEDURE — 3078F DIAST BP <80 MM HG: CPT | Mod: HCNC,CPTII,S$GLB, | Performed by: RADIOLOGY

## 2020-01-14 PROCEDURE — 99999 PR PBB SHADOW E&M-EST. PATIENT-LVL III: CPT | Mod: PBBFAC,HCNC,, | Performed by: RADIOLOGY

## 2020-01-14 PROCEDURE — 1159F MED LIST DOCD IN RCRD: CPT | Mod: HCNC,S$GLB,, | Performed by: RADIOLOGY

## 2020-01-14 PROCEDURE — 1101F PT FALLS ASSESS-DOCD LE1/YR: CPT | Mod: HCNC,CPTII,S$GLB, | Performed by: RADIOLOGY

## 2020-01-14 PROCEDURE — 99204 OFFICE O/P NEW MOD 45 MIN: CPT | Mod: HCNC,S$GLB,, | Performed by: RADIOLOGY

## 2020-01-14 PROCEDURE — 1101F PR PT FALLS ASSESS DOC 0-1 FALLS W/OUT INJ PAST YR: ICD-10-PCS | Mod: HCNC,CPTII,S$GLB, | Performed by: RADIOLOGY

## 2020-01-14 PROCEDURE — 3075F SYST BP GE 130 - 139MM HG: CPT | Mod: HCNC,CPTII,S$GLB, | Performed by: RADIOLOGY

## 2020-01-14 PROCEDURE — 1159F PR MEDICATION LIST DOCUMENTED IN MEDICAL RECORD: ICD-10-PCS | Mod: HCNC,S$GLB,, | Performed by: RADIOLOGY

## 2020-01-14 NOTE — LETTER
January 14, 2020      HARRIET Vazquez MD  [address] [facility] [address]  [address]  [facility] [address]           [facility] - Radiation Oncology  [address]  [place] [address]  Phone: [phone]          Patient: Sha Triplett   MR Number: [id number]   YOB: 1950   Date of Visit: 1/14/2020       Dear Dr. HARRIET Vazquez:    Thank you for referring Sha Triplett to me for evaluation. Attached you will find relevant portions of my assessment and plan of care.    If you have questions, please do not hesitate to call me. I look forward to following Sha Triplett along with you.    Sincerely,    Nishant Blue Jr., MD    Enclosure  CC:  No Recipients    If you would like to receive this communication electronically, please contact [email or web address] or [phone] to request more information on [employer] access.    For providers and/or their staff who would like to refer a patient to [facility], please contact us through our one-stop-shop provider referral line, [facility] , at [phone].    If you feel you have received this communication in error or would no longer like to receive these types of communications, please e-mail [email or web address]

## 2020-01-14 NOTE — PROGRESS NOTES
HISTORY OF PRESENT ILLNESS:   This patient presents for discussion of treatment options for his prostate cancer.      Mr. Triplett's urologic history dates back to 2018 when he was referred to Dr. Vazquez for evaluation of a hematospermia.  JAYLENE revealed a 40 gm prostate without palpable nodules or induration.  PSA returned at 4 ng/ml.  Previous PSA in 2015 was 2.5 ng/ml.  The patient was treated with antibiotics.  Repeat PSA in October of 2018 returned at 4.4 ng/ml.  He subsequently underwent TRUS and biopsy of his prostate on 11/26/18.  The prostate measured 54 cc.  Biopsies from the Rt. medial apex revealed a focus of [place] 6 adenocarcinoma involving 5% of one core.  The remaining biopsies were negative.  There was a small focus of atypical cells which were suspicious at the Rt. lateral base.  The patient elected to proceed with active surveillance.  Repeat PSA in September of 2019 returned at 6.3 ng/ml.  Repeat TRUS and biopsy was performed on 11/25/19.  The prostate measured 40 cc.  Biopsies from the Rt. lateral mid gland, Rt. medial mid gland, Rt. lateral apex and Rt. medial apex revealed Richie 7 (4+3) adenocarcinoma.  The tumor involving 5% or less of each of the specimens.  The remaining biopsies were negative.  The patient presents for discussion of treatment options.  Today the patient states he feels well.  He does note some urinary hesitancy and occasional nocturia.  Notes some erectile dysfunction.       REVIEW OF SYSTEMS:   Review of Systems   Constitutional: Negative for chills, fever and weight loss.   Respiratory: Negative for cough, sputum production and shortness of breath.    Cardiovascular: Negative for chest pain, palpitations and claudication.   Gastrointestinal: Negative for abdominal pain, constipation and diarrhea.   Genitourinary: Negative for dysuria, frequency, hematuria and urgency.   AUA score today was 19.        PAST MEDICAL HISTORY:  Past Medical History:   Diagnosis Date     Cancer     prostate     Chronic hepatitis     Hepatitis C     Thoracic spine pain 8/8/2019    Tobacco dependence        PAST SURGICAL HISTORY:  Past Surgical History:   Procedure Laterality Date    COLONOSCOPY W/ BIOPSIES  3/4/15    / Dr[facility].    LIVER BIOPSY  1998    trus/bx 2018      trus/bx 2019         ALLERGIES:   Review of patient's allergies indicates:  No Known Allergies    MEDICATIONS:  Current Outpatient Medications   Medication Sig    b complex vitamins tablet Take 1 tablet by mouth once daily.    cyanocobalamin, vitamin B-12, (VITAMIN B-12) 50 mcg tablet Take 50 mcg by mouth once daily.    diphenhydrAMINE (BENADRYL) 50 MG tablet Take 50 mg by mouth nightly as needed for Itching.    docusate sodium (COLACE) 100 MG capsule Take 1 capsule (100 mg total) by mouth 2 (two) times daily as needed for Constipation.    ergocalciferol (DRISDOL) 8,000 unit/mL Drop Take 0.25 mLs (2,000 Units total) by mouth once daily.    fish oil-omega-3 fatty acids 300-1,000 mg capsule Take 2 g by mouth once daily.    gabapentin (NEURONTIN) 100 MG capsule Take 300 mg by mouth.    milk thistle 175 mg tablet Take 175 mg by mouth once daily.    multivitamin capsule Take 1 capsule by mouth once daily.    PSYLLIUM SEED, WITH DEXTROSE, (CILIUM ORAL) Take by mouth.    tiZANidine (ZANAFLEX) 4 MG tablet Take 1 tablet (4 mg total) by mouth every 8 (eight) hours.     No current facility-administered medications for this visit.        SOCIAL HISTORY:  Social History     Socioeconomic History    Marital status:      Spouse name: Not on file    Number of children: Not on file    Years of education: Not on file    Highest education level: Not on file   Occupational History    Not on file   Social Needs    Financial resource strain: Not on file    Food insecurity:     Worry: Not on file     Inability: Not on file    Transportation needs:     Medical: Not on file     Non-medical: Not on file   Tobacco  Use    Smoking status: Former Smoker     Packs/day: 0.00    Smokeless tobacco: Never Used   Substance and Sexual Activity    Alcohol use: No    Drug use: No    Sexual activity: Not Currently   Lifestyle    Physical activity:     Days per week: Not on file     Minutes per session: Not on file    Stress: Not on file   Relationships    Social connections:     Talks on phone: Not on file     Gets together: Not on file     Attends [employer]ism service: Not on file     Active member of club or organization: Not on file     Attends meetings of clubs or organizations: Not on file     Relationship status: Not on file   Other Topics Concern    Not on file   Social History Narrative     x 2.  Two bio kids. One adopted.   Retired [employer].  Now a non smoker.  [employer] .         FAMILY HISTORY:  Family History   Problem Relation Age of Onset    Alzheimer's disease Mother     Macular degeneration Father          PHYSICAL EXAMINATION:  There were no vitals filed for this visit.  Physical Exam   Constitutional: He is oriented to person, place, and time and well-developed, well-nourished, and in no distress.   Pulmonary/Chest: Effort normal. No respiratory distress.   Abdominal: Soft. He exhibits no distension.   Neurological: He is alert and oriented to person, place, and time.   Psychiatric: Affect and judgment normal.       ASSESSMENT/PLAN:  History of low risk prostate cancer, now with clinical stage IIC (T1c, N0, M0, GG3, PSA < 10) prostate cancer.     ECO    Today's visit was spent almost entirely on counseling. We reviewed his diagnosis, stage, grade, risk group, and prognosis. We discussed the concept of low risk, moderate risk, and high risk disease.  I explained to the patient he is considered to have unfavorable intermediate risk prostate cancer.  We discussed the different treatment options including active surveillance, prostate brachytherapy, external beam treatment using IMRT /  IGRT techniques  and robotic prostatectomy.We discussed the advantages, disadvantages, risks and benefits, as well as complications of each option. Regarding radiation therapy we discussed treatment planning, the different techniques, short and long term complications. These included radiation cystitis, radiation proctitis, and impotence. We discussed success, failure, and salvage therapeutic options.  Overall, I explained the most reasonable treatment options are RALP or combined external beam therapy followed by brachytherapy boost to the prostate.  Discussed the rational for this recommendation.  Discussed the pros and cons of each treatment.  At this point, the patients is leaning towards surgical resection.  Explained this was very reasonable given his disease and his LUTS.  Will plan MRI of the prostate for further evaluation and  to use for planning of possible brachytherapy.  His is planned for follow up with Dr. Vazquez. Thank you for allowing us to participate in the care of this patient.      Psychosocial Distress screening score of Distress Score: 3 noted and reviewed. No intervention indicated.    I spent approximately 50  minutes reviewing the available records and evaluating the patient, out of which over 50% of the time was spent face to face with the patient in counseling and coordinating this patient's care.

## 2020-01-24 ENCOUNTER — HOSPITAL ENCOUNTER (OUTPATIENT)
Dept: RADIOLOGY | Facility: HOSPITAL | Age: 70
Discharge: HOME | End: 2020-01-24
Attending: RADIOLOGY
Payer: MEDICARE

## 2020-01-24 DIAGNOSIS — D49.59 NEOPLASM OF PROSTATE: ICD-10-CM

## 2020-01-24 PROCEDURE — A9585 GADOBUTROL INJECTION: HCPCS | Mod: HCNC | Performed by: RADIOLOGY

## 2020-01-24 PROCEDURE — 72197 MRI PROSTATE W W/O CONTRAST: ICD-10-PCS | Mod: 26,HCNC,, | Performed by: RADIOLOGY

## 2020-01-24 PROCEDURE — 72197 MRI PELVIS W/O & W/DYE: CPT | Mod: 26,HCNC,, | Performed by: RADIOLOGY

## 2020-01-24 PROCEDURE — 25500020 PHARM REV CODE 255: Mod: HCNC | Performed by: RADIOLOGY

## 2020-01-24 PROCEDURE — 72197 MRI PELVIS W/O & W/DYE: CPT | Mod: TC,HCNC

## 2020-01-24 RX ORDER — GADOBUTROL 604.72 MG/ML
10 INJECTION INTRAVENOUS
Status: COMPLETED | OUTPATIENT
Start: 2020-01-24 | End: 2020-01-24

## 2020-01-24 RX ADMIN — GADOBUTROL 10 ML: 604.72 INJECTION INTRAVENOUS at 03:01

## 2020-01-28 LAB
CREAT SERPL-MCNC: 0.8 MG/DL (ref 0.5–1.4)
SAMPLE: NORMAL

## 2020-06-05 ENCOUNTER — LAB VISIT (OUTPATIENT)
Dept: LAB | Facility: HOSPITAL | Age: 70
End: 2020-06-05
Attending: UROLOGY
Payer: MEDICARE

## 2020-06-05 DIAGNOSIS — C61 PROSTATE CANCER: ICD-10-CM

## 2020-06-05 PROCEDURE — 36415 COLL VENOUS BLD VENIPUNCTURE: CPT | Mod: HCNC,PO

## 2020-06-05 PROCEDURE — 84153 ASSAY OF PSA TOTAL: CPT | Mod: HCNC

## 2020-06-06 LAB — COMPLEXED PSA SERPL-MCNC: 6.7 NG/ML (ref 0–4)

## 2020-06-19 ENCOUNTER — OFFICE VISIT (OUTPATIENT)
Dept: UROLOGY | Facility: CLINIC | Age: 70
End: 2020-06-19
Payer: MEDICARE

## 2020-06-19 VITALS
WEIGHT: 169 LBS | SYSTOLIC BLOOD PRESSURE: 116 MMHG | HEIGHT: 70 IN | BODY MASS INDEX: 24.2 KG/M2 | DIASTOLIC BLOOD PRESSURE: 70 MMHG

## 2020-06-19 DIAGNOSIS — C61 PROSTATE CANCER: Primary | ICD-10-CM

## 2020-06-19 DIAGNOSIS — R35.1 NOCTURIA: ICD-10-CM

## 2020-06-19 DIAGNOSIS — R33.9 INCOMPLETE BLADDER EMPTYING: ICD-10-CM

## 2020-06-19 LAB
BILIRUB SERPL-MCNC: NORMAL MG/DL
BLOOD URINE, POC: NORMAL
COLOR, POC UA: YELLOW
GLUCOSE UR QL STRIP: NORMAL
KETONES UR QL STRIP: NORMAL
LEUKOCYTE ESTERASE URINE, POC: NORMAL
NITRITE, POC UA: NORMAL
PH, POC UA: 8
PROTEIN, POC: NORMAL
SPECIFIC GRAVITY, POC UA: 1000
UROBILINOGEN, POC UA: NORMAL

## 2020-06-19 PROCEDURE — 1126F PR PAIN SEVERITY QUANTIFIED, NO PAIN PRESENT: ICD-10-PCS | Mod: HCNC,S$GLB,, | Performed by: NURSE PRACTITIONER

## 2020-06-19 PROCEDURE — 1159F PR MEDICATION LIST DOCUMENTED IN MEDICAL RECORD: ICD-10-PCS | Mod: HCNC,S$GLB,, | Performed by: NURSE PRACTITIONER

## 2020-06-19 PROCEDURE — 3074F SYST BP LT 130 MM HG: CPT | Mod: HCNC,CPTII,S$GLB, | Performed by: NURSE PRACTITIONER

## 2020-06-19 PROCEDURE — 1159F MED LIST DOCD IN RCRD: CPT | Mod: HCNC,S$GLB,, | Performed by: NURSE PRACTITIONER

## 2020-06-19 PROCEDURE — 99499 RISK ADDL DX/OHS AUDIT: ICD-10-PCS | Mod: HCNC,S$GLB,, | Performed by: NURSE PRACTITIONER

## 2020-06-19 PROCEDURE — 99999 PR PBB SHADOW E&M-EST. PATIENT-LVL III: CPT | Mod: PBBFAC,HCNC,, | Performed by: NURSE PRACTITIONER

## 2020-06-19 PROCEDURE — 1101F PT FALLS ASSESS-DOCD LE1/YR: CPT | Mod: HCNC,CPTII,S$GLB, | Performed by: NURSE PRACTITIONER

## 2020-06-19 PROCEDURE — 99999 PR PBB SHADOW E&M-EST. PATIENT-LVL III: ICD-10-PCS | Mod: PBBFAC,HCNC,, | Performed by: NURSE PRACTITIONER

## 2020-06-19 PROCEDURE — 81001 URINALYSIS AUTO W/SCOPE: CPT | Mod: HCNC,S$GLB,, | Performed by: NURSE PRACTITIONER

## 2020-06-19 PROCEDURE — 3078F PR MOST RECENT DIASTOLIC BLOOD PRESSURE < 80 MM HG: ICD-10-PCS | Mod: HCNC,CPTII,S$GLB, | Performed by: NURSE PRACTITIONER

## 2020-06-19 PROCEDURE — 99214 PR OFFICE/OUTPT VISIT, EST, LEVL IV, 30-39 MIN: ICD-10-PCS | Mod: HCNC,25,S$GLB, | Performed by: NURSE PRACTITIONER

## 2020-06-19 PROCEDURE — 99214 OFFICE O/P EST MOD 30 MIN: CPT | Mod: HCNC,25,S$GLB, | Performed by: NURSE PRACTITIONER

## 2020-06-19 PROCEDURE — 3074F PR MOST RECENT SYSTOLIC BLOOD PRESSURE < 130 MM HG: ICD-10-PCS | Mod: HCNC,CPTII,S$GLB, | Performed by: NURSE PRACTITIONER

## 2020-06-19 PROCEDURE — 99499 UNLISTED E&M SERVICE: CPT | Mod: HCNC,S$GLB,, | Performed by: NURSE PRACTITIONER

## 2020-06-19 PROCEDURE — 3078F DIAST BP <80 MM HG: CPT | Mod: HCNC,CPTII,S$GLB, | Performed by: NURSE PRACTITIONER

## 2020-06-19 PROCEDURE — 81001 POCT URINALYSIS, DIPSTICK OR TABLET REAGENT, AUTOMATED, WITH MICROSCOP: ICD-10-PCS | Mod: HCNC,S$GLB,, | Performed by: NURSE PRACTITIONER

## 2020-06-19 PROCEDURE — 1101F PR PT FALLS ASSESS DOC 0-1 FALLS W/OUT INJ PAST YR: ICD-10-PCS | Mod: HCNC,CPTII,S$GLB, | Performed by: NURSE PRACTITIONER

## 2020-06-19 PROCEDURE — 1126F AMNT PAIN NOTED NONE PRSNT: CPT | Mod: HCNC,S$GLB,, | Performed by: NURSE PRACTITIONER

## 2020-06-19 NOTE — PROGRESS NOTES
Subjective:       Patient ID: Sha Triplett is a 69 y.o. male who was last seen in this office 1/8/2020    Chief Complaint:   Chief Complaint   Patient presents with    Follow-up     Pt. is here for PSA results .. pt. states he has been fine .. he changed his diet and he is feeling just fine.. nonew issues stated        Prostate Cancer  He underwent a prostate needle biopsy on 11/25/2019.  His biopsy was indicated due to: Prostate Cancer.  Afterwards he experienced: Gross Hematuria and Blood in stool.  These symptoms have resolved.  His PSA prior to biopsy was 6.3.  His prostate size was 40 grams.  The ultrasound did not show a median lobe.  He currently does have erectile dysfunction.  His pathology showed: prostate cancer.    He saw Dr Blue in 1/2020. Of note, MD recommended RALP or XRT followed by brachytherapy boost to prostate. MRI of prostate done for further evaluation. Imaging showed  PIRADS 4 right mid apical lesion within the mid lateral peripheral zone, PIRADS 3 lesion within the right anterior transitional zone    He is back today with a PSA. No other complaints    ACTIVE MEDICAL ISSUES:  Patient Active Problem List   Diagnosis    Hypertension - diet controlled    BPH with obstruction/LUTS - declined Flomax    BPH - stable off of Flomax    Nocturia    Prostate cancer    Decreased ROM of intervertebral discs of thoracic spine    Thoracic spine pain    Overweight (BMI 25.0-29.9)    Other male erectile dysfunction - controlled on PRN Viagra       ALLERGIES AND MEDICATIONS: updated and reviewed.  Review of patient's allergies indicates:  No Known Allergies  Current Outpatient Medications   Medication Sig    b complex vitamins tablet Take 1 tablet by mouth once daily.    cyanocobalamin, vitamin B-12, (VITAMIN B-12) 50 mcg tablet Take 50 mcg by mouth once daily.    diphenhydrAMINE (BENADRYL) 50 MG tablet Take 50 mg by mouth nightly as needed for Itching.    docusate sodium (COLACE) 100  "MG capsule Take 1 capsule (100 mg total) by mouth 2 (two) times daily as needed for Constipation.    ergocalciferol (DRISDOL) 8,000 unit/mL Drop Take 0.25 mLs (2,000 Units total) by mouth once daily.    fish oil-omega-3 fatty acids 300-1,000 mg capsule Take 2 g by mouth once daily.    gabapentin (NEURONTIN) 100 MG capsule Take 300 mg by mouth.    milk thistle 175 mg tablet Take 175 mg by mouth once daily.    multivitamin capsule Take 1 capsule by mouth once daily.    PSYLLIUM SEED, WITH DEXTROSE, (CILIUM ORAL) Take by mouth.    tiZANidine (ZANAFLEX) 4 MG tablet Take 1 tablet (4 mg total) by mouth every 8 (eight) hours.     No current facility-administered medications for this visit.        Review of Systems   Constitutional: Negative for activity change, chills, fatigue, fever and unexpected weight change.   Eyes: Negative for discharge, redness and visual disturbance.   Respiratory: Negative for cough, shortness of breath and wheezing.    Cardiovascular: Negative for chest pain and leg swelling.   Gastrointestinal: Negative for abdominal distention, abdominal pain, constipation, diarrhea, nausea and vomiting.   Genitourinary: Negative for decreased urine volume, difficulty urinating, dysuria, flank pain, frequency, hematuria, penile pain, scrotal swelling, testicular pain and urgency.   Musculoskeletal: Negative for arthralgias, joint swelling and myalgias.   Skin: Negative for color change and rash.   Neurological: Negative for dizziness and light-headedness.   Psychiatric/Behavioral: Negative for behavioral problems and confusion. The patient is not nervous/anxious.        Objective:      Vitals:    06/19/20 0957   BP: 116/70   Weight: 76.7 kg (169 lb)   Height: 5' 10" (1.778 m)        Physical Exam   Constitutional: He is oriented to person, place, and time. He appears well-developed.   HENT:   Head: Normocephalic and atraumatic.   Nose: Nose normal.   Eyes: Conjunctivae are normal. Right eye exhibits no " discharge. Left eye exhibits no discharge.   Neck: Normal range of motion. Neck supple. No tracheal deviation present. No thyromegaly present.   Cardiovascular: Normal rate and regular rhythm.    Pulmonary/Chest: Effort normal. No respiratory distress. He has no wheezes.   Abdominal: Soft. He exhibits no distension. There is no abdominal tenderness. No hernia.   Genitourinary:    Genitourinary Comments: Patient declined exam     Musculoskeletal: Normal range of motion.   Neurological: He is alert and oriented to person, place, and time.   Skin: Skin is warm and dry. No rash noted. No erythema.     Psychiatric: His behavior is normal. Judgment normal.       Urine dipstick shows negative for all components.  Micro exam: negative for WBC's or RBC's.     Ref Range & Units 2wk ago   PSA DIAGNOSTIC 0.00 - 4.00 ng/mL 6.7High     Comment: PSA Expected levels:   Hormonal Therapy: <0.05 ng/ml   Prostatectomy: <0.01 ng/ml   Radiation Therapy: <1.00 ng/ml    Resulting Agency  [facility]      Narrative  Performed by: [facility]  PSA 3 months      Specimen Collected: 06/05/20 14:10 Last Resulted: 06/06/20 02:35        Reviewed with patient    Assessment:       1. Prostate cancer    2. Nocturia    3. Incomplete bladder emptying          Plan:       1. Prostate cancer  -He has some questions regarding treatment plan for his prostate cancer. I will have him f/u with Dr Vazquez in a few weeks to discuss his treatment options now that he has seen Rad/Onc  -His PSA is stable    2. Nocturia  -Stable  - POCT urinalysis, dipstick or tablet reag    3. Incomplete bladder emptying  -Stable            Follow up in about 4 weeks (around 7/17/2020).

## 2020-07-10 ENCOUNTER — OFFICE VISIT (OUTPATIENT)
Dept: UROLOGY | Facility: CLINIC | Age: 70
End: 2020-07-10
Payer: MEDICARE

## 2020-07-10 VITALS — TEMPERATURE: 98 F | BODY MASS INDEX: 24.71 KG/M2 | WEIGHT: 172.63 LBS | HEIGHT: 70 IN

## 2020-07-10 DIAGNOSIS — C61 PROSTATE CANCER: Primary | ICD-10-CM

## 2020-07-10 DIAGNOSIS — N52.8 OTHER MALE ERECTILE DYSFUNCTION: ICD-10-CM

## 2020-07-10 DIAGNOSIS — R35.1 NOCTURIA: ICD-10-CM

## 2020-07-10 LAB
BILIRUB SERPL-MCNC: NORMAL MG/DL
BLOOD URINE, POC: NORMAL
COLOR, POC UA: YELLOW
GLUCOSE UR QL STRIP: NORMAL
KETONES UR QL STRIP: NORMAL
LEUKOCYTE ESTERASE URINE, POC: NORMAL
NITRITE, POC UA: NORMAL
PH, POC UA: 6
PROTEIN, POC: NORMAL
SPECIFIC GRAVITY, POC UA: 1010
UROBILINOGEN, POC UA: NORMAL

## 2020-07-10 PROCEDURE — 1126F PR PAIN SEVERITY QUANTIFIED, NO PAIN PRESENT: ICD-10-PCS | Mod: HCNC,S$GLB,, | Performed by: UROLOGY

## 2020-07-10 PROCEDURE — 99214 OFFICE O/P EST MOD 30 MIN: CPT | Mod: HCNC,25,S$GLB, | Performed by: UROLOGY

## 2020-07-10 PROCEDURE — 81001 POCT URINALYSIS, DIPSTICK OR TABLET REAGENT, AUTOMATED, WITH MICROSCOP: ICD-10-PCS | Mod: HCNC,S$GLB,, | Performed by: UROLOGY

## 2020-07-10 PROCEDURE — 1159F PR MEDICATION LIST DOCUMENTED IN MEDICAL RECORD: ICD-10-PCS | Mod: HCNC,S$GLB,, | Performed by: UROLOGY

## 2020-07-10 PROCEDURE — 1126F AMNT PAIN NOTED NONE PRSNT: CPT | Mod: HCNC,S$GLB,, | Performed by: UROLOGY

## 2020-07-10 PROCEDURE — 99999 PR PBB SHADOW E&M-EST. PATIENT-LVL III: CPT | Mod: PBBFAC,HCNC,, | Performed by: UROLOGY

## 2020-07-10 PROCEDURE — 99999 PR PBB SHADOW E&M-EST. PATIENT-LVL III: ICD-10-PCS | Mod: PBBFAC,HCNC,, | Performed by: UROLOGY

## 2020-07-10 PROCEDURE — 3008F PR BODY MASS INDEX (BMI) DOCUMENTED: ICD-10-PCS | Mod: HCNC,CPTII,S$GLB, | Performed by: UROLOGY

## 2020-07-10 PROCEDURE — 1101F PT FALLS ASSESS-DOCD LE1/YR: CPT | Mod: HCNC,CPTII,S$GLB, | Performed by: UROLOGY

## 2020-07-10 PROCEDURE — 1101F PR PT FALLS ASSESS DOC 0-1 FALLS W/OUT INJ PAST YR: ICD-10-PCS | Mod: HCNC,CPTII,S$GLB, | Performed by: UROLOGY

## 2020-07-10 PROCEDURE — 81001 URINALYSIS AUTO W/SCOPE: CPT | Mod: HCNC,S$GLB,, | Performed by: UROLOGY

## 2020-07-10 PROCEDURE — 1159F MED LIST DOCD IN RCRD: CPT | Mod: HCNC,S$GLB,, | Performed by: UROLOGY

## 2020-07-10 PROCEDURE — 3008F BODY MASS INDEX DOCD: CPT | Mod: HCNC,CPTII,S$GLB, | Performed by: UROLOGY

## 2020-07-10 PROCEDURE — 99214 PR OFFICE/OUTPT VISIT, EST, LEVL IV, 30-39 MIN: ICD-10-PCS | Mod: HCNC,25,S$GLB, | Performed by: UROLOGY

## 2020-07-10 NOTE — PROGRESS NOTES
Subjective:       Patient ID: Sha Triplett is a 69 y.o. male who was last seen in this office 6/19/2020    Chief Complaint:   Chief Complaint   Patient presents with    Prostate Cancer     pt here to discuss treatment      Prostate Cancer  He underwent a prostate needle biopsy on 11/25/2019.  His biopsy was indicated due to: Prostate Cancer.  Afterwards he experienced: Gross Hematuria and Blood in stool.  These symptoms have resolved.  His PSA prior to biopsy was 6.3.  His prostate size was 40 grams.  The ultrasound did not show a median lobe.  He currently does have erectile dysfunction.  His pathology showed: prostate cancer.    He saw Dr Blue in 1/2020. Of note, MD recommended RALP or XRT followed by brachytherapy boost to prostate. MRI of prostate done for further evaluation. Imaging showed  PIRADS 4 right mid apical lesion within the mid lateral peripheral zone, PIRADS 3 lesion within the right anterior transitional zone    PSA in June 2020: 6.7.    He is back to discuss further.    ACTIVE MEDICAL ISSUES:  Patient Active Problem List   Diagnosis    Hypertension - diet controlled    BPH with obstruction/LUTS - declined Flomax    BPH - stable off of Flomax    Nocturia    Prostate cancer    Decreased ROM of intervertebral discs of thoracic spine    Thoracic spine pain    Overweight (BMI 25.0-29.9)    Other male erectile dysfunction - controlled on PRN Viagra       ALLERGIES AND MEDICATIONS: updated and reviewed.  Review of patient's allergies indicates:  No Known Allergies  Current Outpatient Medications   Medication Sig    b complex vitamins tablet Take 1 tablet by mouth once daily.    cyanocobalamin, vitamin B-12, (VITAMIN B-12) 50 mcg tablet Take 50 mcg by mouth once daily.    diphenhydrAMINE (BENADRYL) 50 MG tablet Take 50 mg by mouth nightly as needed for Itching.    docusate sodium (COLACE) 100 MG capsule Take 1 capsule (100 mg total) by mouth 2 (two) times daily as needed for  "Constipation.    ergocalciferol (DRISDOL) 8,000 unit/mL Drop Take 0.25 mLs (2,000 Units total) by mouth once daily.    fish oil-omega-3 fatty acids 300-1,000 mg capsule Take 2 g by mouth once daily.    gabapentin (NEURONTIN) 100 MG capsule Take 300 mg by mouth.    milk thistle 175 mg tablet Take 175 mg by mouth once daily.    multivitamin capsule Take 1 capsule by mouth once daily.    PSYLLIUM SEED, WITH DEXTROSE, (CILIUM ORAL) Take by mouth.    tiZANidine (ZANAFLEX) 4 MG tablet Take 1 tablet (4 mg total) by mouth every 8 (eight) hours. (Patient not taking: Reported on 7/10/2020)     No current facility-administered medications for this visit.        Review of Systems   Constitutional: Negative for activity change, fatigue, fever and unexpected weight change.   HENT: Negative for congestion.    Eyes: Negative for redness.   Respiratory: Negative for chest tightness and shortness of breath.    Cardiovascular: Negative for chest pain and leg swelling.   Gastrointestinal: Negative for abdominal pain, constipation, diarrhea, nausea and vomiting.   Genitourinary: Negative for dysuria, flank pain, frequency, hematuria, penile pain, penile swelling, scrotal swelling, testicular pain and urgency.   Musculoskeletal: Negative for arthralgias and back pain.   Neurological: Negative for dizziness and light-headedness.   Psychiatric/Behavioral: Negative for behavioral problems and confusion. The patient is not nervous/anxious.    All other systems reviewed and are negative.      Objective:      Vitals:    07/10/20 1318   Temp: 98 °F (36.7 °C)   Weight: 78.3 kg (172 lb 9.9 oz)   Height: 5' 10" (1.778 m)     Physical Exam   Nursing note and vitals reviewed.  Constitutional: He is oriented to person, place, and time. He appears well-developed.   HENT:   Head: Normocephalic.   Eyes: Conjunctivae are normal.   Neck: Normal range of motion. Neck supple. No tracheal deviation present. No thyromegaly present.   Cardiovascular: " Normal rate and normal heart sounds.    Pulmonary/Chest: Effort normal and breath sounds normal. No respiratory distress. He has no wheezes.   Abdominal: Soft. Bowel sounds are normal. There is no abdominal tenderness. There is no rebound. No hernia.   Musculoskeletal: Normal range of motion. No tenderness.   Lymphadenopathy:     He has no cervical adenopathy.   Neurological: He is alert and oriented to person, place, and time.   Skin: Skin is warm and dry. No rash noted. No erythema.     Psychiatric: His behavior is normal. Judgment and thought content normal.       Urine dipstick shows negative for all components.  Micro exam: negative for WBC's or RBC's.    Part 1   Prostate needle biopsy (1, submitted as left base lateral):   -No evidence of malignancy     Part 2   Prostate needle biopsy (1, submitted as left base medial):   -No evidence of malignancy     Part 3   Prostate needle biopsy (1, submitted as left mid lateral):   -No evidence of malignancy     Part 4   Prostate needle biopsy (1, submitted as left mid medial):   -No evidence of malignancy     Part 5   Prostate needle biopsy (1, submitted as left apex lateral):   -No evidence of malignancy     Part 6   Prostate needle biopsy (1, submitted as left apex medial):   -No evidence of malignancy     Part 7   Prostate needle biopsy (1, submitted as right base lateral):   -No evidence of malignancy   -Chronic inflammation     Part 8   Prostate needle biopsy (1, submitted as right base medial):   -No evidence of malignancy     Part 9   Prostate needle biopsy (1, submitted as right mid lateral):   -Adenocarcinoma, [place] patterns 4-3, pattern score 7, group 3, occupying 5%   of a 14 mm biopsy     Part 10   Prostate needle biopsy (1, submitted as right mid medial):   -Adenocarcinoma, [place] patterns 4-3, pattern score 7, group 3, occupying 1%   of a 13 mm biopsy     Part 11   Prostate needle biopsy (1, submitted as right apex lateral):   -Adenocarcinoma, Richie  patterns 4-3, pattern score 7, group 3, occupying 2%   of a 12 mm biopsy     Part 12   Prostate needle biopsy (1, submitted as right apex medial):   -Adenocarcinoma, [place] patterns 4-3, pattern score 7, group 3, occupying 2%   of a 13 mm biopsy     Assessment:       1. Prostate cancer    2. Other male erectile dysfunction - controlled on PRN Viagra    3. Nocturia          Plan:       1. Prostate cancer  He has met with Dr. Blue for radiation.  He does not feel comfortable with that treatment.  He a cousin that had cryoablation of the prostate.  He wants to pursue this treatment.  I explained to him that the cure rate with cryoablation is not as good as prostatectomy or XRT.  He will have ED after cryoablation.  His Richie score is 4+3 so removal would likely be a better option.    I also recommend starting treatment soon, he feels it is okay to wait.  I explained that I disagree.    He will go to [facility] to talk to his cousin's urologist.     2. Other male erectile dysfunction - controlled on PRN Viagra  Viagra when needed    3. Nocturia  Limit evening fluids            No follow-ups on file.

## 2020-07-13 ENCOUNTER — OFFICE VISIT (OUTPATIENT)
Dept: OPTOMETRY | Facility: CLINIC | Age: 70
End: 2020-07-13
Payer: MEDICARE

## 2020-07-13 ENCOUNTER — HOSPITAL ENCOUNTER (OUTPATIENT)
Dept: RADIOLOGY | Facility: HOSPITAL | Age: 70
Discharge: HOME | End: 2020-07-13
Attending: FAMILY MEDICINE
Payer: MEDICARE

## 2020-07-13 DIAGNOSIS — H52.7 REFRACTIVE ERROR: ICD-10-CM

## 2020-07-13 DIAGNOSIS — H43.811 POSTERIOR VITREOUS DETACHMENT OF RIGHT EYE: ICD-10-CM

## 2020-07-13 DIAGNOSIS — S27.0XXD TRAUMATIC PNEUMOTHORAX, SUBSEQUENT ENCOUNTER: ICD-10-CM

## 2020-07-13 DIAGNOSIS — H04.123 DRY EYE SYNDROME, BILATERAL: ICD-10-CM

## 2020-07-13 DIAGNOSIS — S27.321D CONTUSION OF LEFT LUNG, SUBSEQUENT ENCOUNTER: ICD-10-CM

## 2020-07-13 DIAGNOSIS — H25.13 NUCLEAR SCLEROSIS, BILATERAL: Primary | ICD-10-CM

## 2020-07-13 DIAGNOSIS — H25.013 CORTICAL AGE-RELATED CATARACT OF BOTH EYES: ICD-10-CM

## 2020-07-13 PROCEDURE — 92014 COMPRE OPH EXAM EST PT 1/>: CPT | Mod: HCNC,S$GLB,, | Performed by: OPTOMETRIST

## 2020-07-13 PROCEDURE — 92015 PR REFRACTION: ICD-10-PCS | Mod: HCNC,S$GLB,, | Performed by: OPTOMETRIST

## 2020-07-13 PROCEDURE — 71046 XR CHEST PA AND LATERAL: ICD-10-PCS | Mod: 26,HCNC,, | Performed by: RADIOLOGY

## 2020-07-13 PROCEDURE — 99999 PR PBB SHADOW E&M-EST. PATIENT-LVL II: CPT | Mod: PBBFAC,HCNC,, | Performed by: OPTOMETRIST

## 2020-07-13 PROCEDURE — 71046 X-RAY EXAM CHEST 2 VIEWS: CPT | Mod: TC,HCNC,FY,PO

## 2020-07-13 PROCEDURE — 92014 PR EYE EXAM, EST PATIENT,COMPREHESV: ICD-10-PCS | Mod: HCNC,S$GLB,, | Performed by: OPTOMETRIST

## 2020-07-13 PROCEDURE — 71046 X-RAY EXAM CHEST 2 VIEWS: CPT | Mod: 26,HCNC,, | Performed by: RADIOLOGY

## 2020-07-13 PROCEDURE — 99999 PR PBB SHADOW E&M-EST. PATIENT-LVL II: ICD-10-PCS | Mod: PBBFAC,HCNC,, | Performed by: OPTOMETRIST

## 2020-07-13 PROCEDURE — 92015 DETERMINE REFRACTIVE STATE: CPT | Mod: HCNC,S$GLB,, | Performed by: OPTOMETRIST

## 2020-07-13 NOTE — PROGRESS NOTES
Subjective:       Patient ID: Sha Triplett is a 70 y.o. male      Chief Complaint   Patient presents with    Concerns About Ocular Health     History of Present Illness  Dls: 2/12/19 Dr. Varela     71 y/o male presents today with c/o photophobia os >od fbs os burning os   for a while feels okay today.   Pt states no changes in vision. Pt wears bifocal glasses.     No tearing  + itching  + burning  + os pain  No ha's  + floaters  No flashes    Eye meds  Generic dry eye gtts ou prn         Assessment/Plan:     1. Nuclear sclerosis, bilateral  2. Cortical age-related cataract of both eyes  Educated pt on presence of cataracts and effects on vision. No surgery at this time. Recheck in one year.    3. Dry eye syndrome, bilateral  Recommend artificial tears (Systane/Refresh) 1 drop 2-4x per day and PRN. Discussed different drop options - AT/PFAT/gel/ointment. Chronicity of disease and treatment discussed. Avoid Visine and Clear Eyes. Pt states happens mostly after cutting grass - advised may be airborne allergens or sweat and to use tears before going outside.     4. Posterior vitreous detachment of right eye  Stable. Monitor.    5. Refractive error  Educated patient on refractive error and discussed lens options. Dispensed updated spectacle Rx. Educated about adaptation period to new specs.    Eyeglass Final Rx     Eyeglass Final Rx       Sphere Cylinder Axis Add    Right -2.00 +1.75 180 +2.75    Left -2.25 +1.50 175 +2.75    Expiration Date: 7/14/2021                  Follow up in about 1 year (around 7/13/2021).

## 2020-08-19 ENCOUNTER — LAB VISIT (OUTPATIENT)
Dept: LAB | Facility: HOSPITAL | Age: 70
End: 2020-08-19
Attending: UROLOGY
Payer: MEDICARE

## 2020-08-19 DIAGNOSIS — C61 PROSTATE CANCER: ICD-10-CM

## 2020-08-19 LAB — COMPLEXED PSA SERPL-MCNC: 5.7 NG/ML (ref 0–4)

## 2020-08-19 PROCEDURE — 36415 COLL VENOUS BLD VENIPUNCTURE: CPT | Mod: HCNC,PO

## 2020-08-19 PROCEDURE — 84153 ASSAY OF PSA TOTAL: CPT | Mod: HCNC

## 2020-08-25 ENCOUNTER — OFFICE VISIT (OUTPATIENT)
Dept: UROLOGY | Facility: CLINIC | Age: 70
End: 2020-08-25
Payer: MEDICARE

## 2020-08-25 VITALS — BODY MASS INDEX: 24.52 KG/M2 | WEIGHT: 171.31 LBS | TEMPERATURE: 97 F | HEIGHT: 70 IN

## 2020-08-25 DIAGNOSIS — C61 PROSTATE CANCER: Primary | ICD-10-CM

## 2020-08-25 DIAGNOSIS — R35.1 NOCTURIA: ICD-10-CM

## 2020-08-25 DIAGNOSIS — N52.8 OTHER MALE ERECTILE DYSFUNCTION: ICD-10-CM

## 2020-08-25 DIAGNOSIS — R33.9 INCOMPLETE BLADDER EMPTYING: ICD-10-CM

## 2020-08-25 PROCEDURE — 99214 OFFICE O/P EST MOD 30 MIN: CPT | Mod: HCNC,S$GLB,, | Performed by: UROLOGY

## 2020-08-25 PROCEDURE — 3008F BODY MASS INDEX DOCD: CPT | Mod: HCNC,CPTII,S$GLB, | Performed by: UROLOGY

## 2020-08-25 PROCEDURE — 1126F PR PAIN SEVERITY QUANTIFIED, NO PAIN PRESENT: ICD-10-PCS | Mod: HCNC,S$GLB,, | Performed by: UROLOGY

## 2020-08-25 PROCEDURE — 1101F PT FALLS ASSESS-DOCD LE1/YR: CPT | Mod: HCNC,CPTII,S$GLB, | Performed by: UROLOGY

## 2020-08-25 PROCEDURE — 99999 PR PBB SHADOW E&M-EST. PATIENT-LVL III: CPT | Mod: PBBFAC,HCNC,, | Performed by: UROLOGY

## 2020-08-25 PROCEDURE — 99999 PR PBB SHADOW E&M-EST. PATIENT-LVL III: ICD-10-PCS | Mod: PBBFAC,HCNC,, | Performed by: UROLOGY

## 2020-08-25 PROCEDURE — 1126F AMNT PAIN NOTED NONE PRSNT: CPT | Mod: HCNC,S$GLB,, | Performed by: UROLOGY

## 2020-08-25 PROCEDURE — 99214 PR OFFICE/OUTPT VISIT, EST, LEVL IV, 30-39 MIN: ICD-10-PCS | Mod: HCNC,S$GLB,, | Performed by: UROLOGY

## 2020-08-25 PROCEDURE — 3008F PR BODY MASS INDEX (BMI) DOCUMENTED: ICD-10-PCS | Mod: HCNC,CPTII,S$GLB, | Performed by: UROLOGY

## 2020-08-25 PROCEDURE — 1159F MED LIST DOCD IN RCRD: CPT | Mod: HCNC,S$GLB,, | Performed by: UROLOGY

## 2020-08-25 PROCEDURE — 1159F PR MEDICATION LIST DOCUMENTED IN MEDICAL RECORD: ICD-10-PCS | Mod: HCNC,S$GLB,, | Performed by: UROLOGY

## 2020-08-25 PROCEDURE — 1101F PR PT FALLS ASSESS DOC 0-1 FALLS W/OUT INJ PAST YR: ICD-10-PCS | Mod: HCNC,CPTII,S$GLB, | Performed by: UROLOGY

## 2020-08-25 NOTE — PROGRESS NOTES
Subjective:       Patient ID: Sha Triplett is a 70 y.o. male who was last seen in this office 7/10/2020    Chief Complaint:   Chief Complaint   Patient presents with    Prostate Cancer     6 week f/u with psa         Prostate Cancer  He underwent a prostate needle biopsy on 11/25/2019.  His biopsy was indicated due to: Prostate Cancer.  Afterwards he experienced: Gross Hematuria and Blood in stool.  These symptoms have resolved.  His PSA prior to biopsy was 6.3.  His prostate size was 40 grams.  The ultrasound did not show a median lobe.  He currently does have erectile dysfunction.  His pathology showed: prostate cancer.     He saw Dr Blue in 1/2020. Of note, MD recommended RALP or XRT followed by brachytherapy boost to prostate. MRI of prostate done for further evaluation. Imaging showed  PIRADS 4 right mid apical lesion within the mid lateral peripheral zone, PIRADS 3 lesion within the right anterior transitional zone     PSA in June 2020: 6.7.     He is back with a new PSA.      ACTIVE MEDICAL ISSUES:  Patient Active Problem List   Diagnosis    Hypertension - diet controlled    BPH with obstruction/LUTS - declined Flomax    BPH - stable off of Flomax    Nocturia    Prostate cancer    Decreased ROM of intervertebral discs of thoracic spine    Thoracic spine pain    Overweight (BMI 25.0-29.9)    Other male erectile dysfunction - controlled on PRN Viagra    Refractive error    Nuclear sclerosis, bilateral    Cortical age-related cataract of both eyes       ALLERGIES AND MEDICATIONS: updated and reviewed.  Review of patient's allergies indicates:  No Known Allergies  Current Outpatient Medications   Medication Sig    b complex vitamins tablet Take 1 tablet by mouth once daily.    cyanocobalamin, vitamin B-12, (VITAMIN B-12) 50 mcg tablet Take 50 mcg by mouth once daily.    diphenhydrAMINE (BENADRYL) 50 MG tablet Take 50 mg by mouth nightly as needed for Itching.    docusate sodium  "(COLACE) 100 MG capsule Take 1 capsule (100 mg total) by mouth 2 (two) times daily as needed for Constipation.    ergocalciferol (DRISDOL) 8,000 unit/mL Drop Take 0.25 mLs (2,000 Units total) by mouth once daily.    fish oil-omega-3 fatty acids 300-1,000 mg capsule Take 2 g by mouth once daily.    milk thistle 175 mg tablet Take 175 mg by mouth once daily.    multivitamin capsule Take 1 capsule by mouth once daily.    PSYLLIUM SEED, WITH DEXTROSE, (CILIUM ORAL) Take by mouth.    tiZANidine (ZANAFLEX) 4 MG tablet Take 1 tablet (4 mg total) by mouth every 8 (eight) hours.    gabapentin (NEURONTIN) 100 MG capsule Take 300 mg by mouth.     No current facility-administered medications for this visit.        Review of Systems   Constitutional: Negative for activity change, fatigue, fever and unexpected weight change.   HENT: Negative for congestion.    Eyes: Negative for redness.   Respiratory: Negative for chest tightness and shortness of breath.    Cardiovascular: Negative for chest pain and leg swelling.   Gastrointestinal: Negative for abdominal pain, constipation, diarrhea, nausea and vomiting.   Genitourinary: Negative for dysuria, flank pain, frequency, hematuria, penile pain, penile swelling, scrotal swelling, testicular pain and urgency.   Musculoskeletal: Negative for arthralgias and back pain.   Neurological: Negative for dizziness and light-headedness.   Psychiatric/Behavioral: Negative for behavioral problems and confusion. The patient is not nervous/anxious.    All other systems reviewed and are negative.      Objective:      Vitals:    08/25/20 1445   Temp: 97.3 °F (36.3 °C)   Weight: 77.7 kg (171 lb 4.8 oz)   Height: 5' 10" (1.778 m)     Physical Exam   Nursing note and vitals reviewed.  Constitutional: He is oriented to person, place, and time. He appears well-developed.   HENT:   Head: Normocephalic.   Eyes: Conjunctivae are normal.   Neck: Normal range of motion. Neck supple. No tracheal " deviation present. No thyromegaly present.   Cardiovascular: Normal rate and normal heart sounds.    Pulmonary/Chest: Effort normal and breath sounds normal. No respiratory distress. He has no wheezes.   Abdominal: Soft. Bowel sounds are normal. There is no abdominal tenderness. There is no rebound. No hernia.   Musculoskeletal: Normal range of motion. No tenderness.   Lymphadenopathy:     He has no cervical adenopathy.   Neurological: He is alert and oriented to person, place, and time.   Skin: Skin is warm and dry. No rash noted. No erythema.     Psychiatric: His behavior is normal. Judgment and thought content normal.       Urine dipstick shows negative for all components.  Micro exam: negative for WBC's or RBC's.    Results for DEEPTHI LEZAMA (MRN [id number]) as of 8/25/2020 14:54   Ref. Range 8/19/2020 09:10   PSA DIAGNOSTIC Latest Ref Range: 0.00 - 4.00 ng/mL 5.7 (H)       Diagnosis Part 1   Prostate needle biopsy (1, submitted as left base lateral):   -No evidence of malignancy     Part 2   Prostate needle biopsy (1, submitted as left base medial):   -No evidence of malignancy     Part 3   Prostate needle biopsy (1, submitted as left mid lateral):   -No evidence of malignancy     Part 4   Prostate needle biopsy (1, submitted as left mid medial):   -No evidence of malignancy     Part 5   Prostate needle biopsy (1, submitted as left apex lateral):   -No evidence of malignancy     Part 6   Prostate needle biopsy (1, submitted as left apex medial):   -No evidence of malignancy     Part 7   Prostate needle biopsy (1, submitted as right base lateral):   -No evidence of malignancy   -Chronic inflammation     Part 8   Prostate needle biopsy (1, submitted as right base medial):   -No evidence of malignancy     Part 9   Prostate needle biopsy (1, submitted as right mid lateral):   -Adenocarcinoma, [place] patterns 4-3, pattern score 7, group 3, occupying 5%   of a 14 mm biopsy     Part 10   Prostate needle biopsy (1,  submitted as right mid medial):   -Adenocarcinoma, [place] patterns 4-3, pattern score 7, group 3, occupying 1%   of a 13 mm biopsy     Part 11   Prostate needle biopsy (1, submitted as right apex lateral):   -Adenocarcinoma, [place] patterns 4-3, pattern score 7, group 3, occupying 2%   of a 12 mm biopsy     Part 12   Prostate needle biopsy (1, submitted as right apex medial):   -Adenocarcinoma, [place] patterns 4-3, pattern score 7, group 3, occupying 2%   of a 13 mm biopsy          Assessment:       1. Prostate cancer    2. Other male erectile dysfunction - controlled on PRN Viagra    3. Nocturia    4. Incomplete bladder emptying          Plan:       1. Prostate cancer  He will likely need treatment at some point.  His decrease in PSA is encouraging.  He will need a repeat biopsy in November if he has not decided on treatment by that point.    He was not able to see someone at [facility]  I have offered to have him get a second opinion.  I will have him see Dr. Larsen.  He would like to look into cryoablation.   He does not want radiation.    2. Other male erectile dysfunction - controlled on PRN Viagra  Viagra    3. Nocturia  Limit evening fluids    4. Incomplete bladder emptying              No follow-ups on file.

## 2020-09-29 ENCOUNTER — PATIENT MESSAGE (OUTPATIENT)
Dept: OTHER | Facility: OTHER | Age: 70
End: 2020-09-29

## 2020-11-10 ENCOUNTER — TELEPHONE (OUTPATIENT)
Dept: UROLOGY | Facility: CLINIC | Age: 70
End: 2020-11-10

## 2020-11-10 ENCOUNTER — LAB VISIT (OUTPATIENT)
Dept: LAB | Facility: HOSPITAL | Age: 70
End: 2020-11-10
Attending: UROLOGY
Payer: MEDICARE

## 2020-11-10 DIAGNOSIS — C61 PROSTATE CANCER: Primary | ICD-10-CM

## 2020-11-10 DIAGNOSIS — C61 PROSTATE CANCER: ICD-10-CM

## 2020-11-10 LAB — COMPLEXED PSA SERPL-MCNC: 6.8 NG/ML (ref 0–4)

## 2020-11-10 PROCEDURE — 84153 ASSAY OF PSA TOTAL: CPT | Mod: HCNC

## 2020-11-10 PROCEDURE — 36415 COLL VENOUS BLD VENIPUNCTURE: CPT | Mod: HCNC,PO

## 2020-11-10 NOTE — TELEPHONE ENCOUNTER
----- Message from Lisette Giordano sent at 11/10/2020  1:36 PM CST -----  Regarding: self [phone]  .Type: Patient Call Back    Who called: self     What is the request in detail: Pt is requesting orders for PSA check     Can the clinic reply by [facility]? Call back     Would the patient rather a call back or a response via [facility]?  Call back     Best call back number: [phone]

## 2020-11-17 ENCOUNTER — OFFICE VISIT (OUTPATIENT)
Dept: UROLOGY | Facility: CLINIC | Age: 70
End: 2020-11-17
Payer: MEDICARE

## 2020-11-17 VITALS — BODY MASS INDEX: 24.87 KG/M2 | HEIGHT: 70 IN | WEIGHT: 173.75 LBS

## 2020-11-17 DIAGNOSIS — N52.8 OTHER MALE ERECTILE DYSFUNCTION: ICD-10-CM

## 2020-11-17 DIAGNOSIS — R35.1 NOCTURIA: ICD-10-CM

## 2020-11-17 DIAGNOSIS — C61 PROSTATE CANCER: Primary | ICD-10-CM

## 2020-11-17 PROCEDURE — 1159F PR MEDICATION LIST DOCUMENTED IN MEDICAL RECORD: ICD-10-PCS | Mod: HCNC,S$GLB,, | Performed by: UROLOGY

## 2020-11-17 PROCEDURE — 99999 PR PBB SHADOW E&M-EST. PATIENT-LVL III: ICD-10-PCS | Mod: PBBFAC,HCNC,, | Performed by: UROLOGY

## 2020-11-17 PROCEDURE — 3008F PR BODY MASS INDEX (BMI) DOCUMENTED: ICD-10-PCS | Mod: HCNC,CPTII,S$GLB, | Performed by: UROLOGY

## 2020-11-17 PROCEDURE — 1101F PT FALLS ASSESS-DOCD LE1/YR: CPT | Mod: HCNC,CPTII,S$GLB, | Performed by: UROLOGY

## 2020-11-17 PROCEDURE — 3008F BODY MASS INDEX DOCD: CPT | Mod: HCNC,CPTII,S$GLB, | Performed by: UROLOGY

## 2020-11-17 PROCEDURE — 1159F MED LIST DOCD IN RCRD: CPT | Mod: HCNC,S$GLB,, | Performed by: UROLOGY

## 2020-11-17 PROCEDURE — 81001 URINALYSIS AUTO W/SCOPE: CPT | Mod: HCNC,S$GLB,, | Performed by: UROLOGY

## 2020-11-17 PROCEDURE — 1126F AMNT PAIN NOTED NONE PRSNT: CPT | Mod: HCNC,S$GLB,, | Performed by: UROLOGY

## 2020-11-17 PROCEDURE — 99214 OFFICE O/P EST MOD 30 MIN: CPT | Mod: HCNC,25,S$GLB, | Performed by: UROLOGY

## 2020-11-17 PROCEDURE — 3288F FALL RISK ASSESSMENT DOCD: CPT | Mod: HCNC,CPTII,S$GLB, | Performed by: UROLOGY

## 2020-11-17 PROCEDURE — 99999 PR PBB SHADOW E&M-EST. PATIENT-LVL III: CPT | Mod: PBBFAC,HCNC,, | Performed by: UROLOGY

## 2020-11-17 PROCEDURE — 1101F PR PT FALLS ASSESS DOC 0-1 FALLS W/OUT INJ PAST YR: ICD-10-PCS | Mod: HCNC,CPTII,S$GLB, | Performed by: UROLOGY

## 2020-11-17 PROCEDURE — 99214 PR OFFICE/OUTPT VISIT, EST, LEVL IV, 30-39 MIN: ICD-10-PCS | Mod: HCNC,25,S$GLB, | Performed by: UROLOGY

## 2020-11-17 PROCEDURE — 3288F PR FALLS RISK ASSESSMENT DOCUMENTED: ICD-10-PCS | Mod: HCNC,CPTII,S$GLB, | Performed by: UROLOGY

## 2020-11-17 PROCEDURE — 81001 POCT URINALYSIS, DIPSTICK OR TABLET REAGENT, AUTOMATED, WITH MICROSCOP: ICD-10-PCS | Mod: HCNC,S$GLB,, | Performed by: UROLOGY

## 2020-11-17 PROCEDURE — 1126F PR PAIN SEVERITY QUANTIFIED, NO PAIN PRESENT: ICD-10-PCS | Mod: HCNC,S$GLB,, | Performed by: UROLOGY

## 2020-11-17 RX ORDER — CIPROFLOXACIN 500 MG/1
500 TABLET ORAL 2 TIMES DAILY
Qty: 6 TABLET | Refills: 0 | Status: SHIPPED | OUTPATIENT
Start: 2020-11-17 | End: 2020-11-20

## 2020-11-17 NOTE — PROGRESS NOTES
Subjective:       Patient ID: Sha Triplett is a 70 y.o. male who was last seen in this office 7/10/2020    Chief Complaint:   Chief Complaint   Patient presents with    Prostate Cancer     3 month f/u with labwork         Prostate Cancer  He underwent a prostate needle biopsy on 11/25/2019.  His biopsy was indicated due to: Prostate Cancer.  Afterwards he experienced: Gross Hematuria and Blood in stool.  These symptoms have resolved.  His PSA prior to biopsy was 6.3.  His prostate size was 40 grams.  The ultrasound did not show a median lobe.  He currently does have erectile dysfunction.  His pathology showed: prostate cancer.     He saw Dr Blue in 1/2020. Of note, MD recommended RALP or XRT followed by brachytherapy boost to prostate. MRI of prostate done for further evaluation. Imaging showed  PIRADS 4 right mid apical lesion within the mid lateral peripheral zone, PIRADS 3 lesion within the right anterior transitional zone       PSA Diagnostic 0.00 - 4.00 ng/mL 6.8High     Comment: PSA Expected levels:   Hormonal Therapy: <0.05 ng/ml   Prostatectomy: <0.01 ng/ml   Radiation Therapy: <1.00 ng/ml    Resulting Agency  [facility]         Specimen Collected: 11/10/20 14:18   Last Resulted: 11/10/20 22:39            ACTIVE MEDICAL ISSUES:  Patient Active Problem List   Diagnosis    Hypertension - diet controlled    BPH with obstruction/LUTS - declined Flomax    BPH - stable off of Flomax    Nocturia    Prostate cancer    Decreased ROM of intervertebral discs of thoracic spine    Thoracic spine pain    Overweight (BMI 25.0-29.9)    Other male erectile dysfunction - controlled on PRN Viagra    Refractive error    Nuclear sclerosis, bilateral    Cortical age-related cataract of both eyes       ALLERGIES AND MEDICATIONS: updated and reviewed.  Review of patient's allergies indicates:  No Known Allergies  Current Outpatient Medications   Medication Sig    b complex vitamins tablet Take 1 tablet by mouth  once daily.    cyanocobalamin, vitamin B-12, (VITAMIN B-12) 50 mcg tablet Take 50 mcg by mouth once daily.    diphenhydrAMINE (BENADRYL) 50 MG tablet Take 50 mg by mouth nightly as needed for Itching.    docusate sodium (COLACE) 100 MG capsule Take 1 capsule (100 mg total) by mouth 2 (two) times daily as needed for Constipation.    ergocalciferol (DRISDOL) 8,000 unit/mL Drop Take 0.25 mLs (2,000 Units total) by mouth once daily.    fish oil-omega-3 fatty acids 300-1,000 mg capsule Take 2 g by mouth once daily.    milk thistle 175 mg tablet Take 175 mg by mouth once daily.    multivitamin capsule Take 1 capsule by mouth once daily.    PSYLLIUM SEED, WITH DEXTROSE, (CILIUM ORAL) Take by mouth.    tiZANidine (ZANAFLEX) 4 MG tablet Take 1 tablet (4 mg total) by mouth every 8 (eight) hours.    ciprofloxacin HCl (CIPRO) 500 MG tablet Take 1 tablet (500 mg total) by mouth 2 (two) times daily. for 6 doses    gabapentin (NEURONTIN) 100 MG capsule Take 300 mg by mouth.     No current facility-administered medications for this visit.        Review of Systems   Constitutional: Negative for activity change, fatigue, fever and unexpected weight change.   HENT: Negative for congestion.    Eyes: Negative for redness.   Respiratory: Negative for chest tightness and shortness of breath.    Cardiovascular: Negative for chest pain and leg swelling.   Gastrointestinal: Negative for abdominal pain, constipation, diarrhea, nausea and vomiting.   Genitourinary: Negative for dysuria, flank pain, frequency, hematuria, penile pain, penile swelling, scrotal swelling, testicular pain and urgency.   Musculoskeletal: Negative for arthralgias and back pain.   Neurological: Negative for dizziness and light-headedness.   Psychiatric/Behavioral: Negative for behavioral problems and confusion. The patient is not nervous/anxious.    All other systems reviewed and are negative.      Objective:      Vitals:    11/17/20 1523   Weight: 78.8 kg  "(173 lb 11.6 oz)   Height: 5' 10" (1.778 m)     Physical Exam  Vitals signs and nursing note reviewed.   Constitutional:       Appearance: He is well-developed.   HENT:      Head: Normocephalic.   Eyes:      Conjunctiva/sclera: Conjunctivae normal.   Neck:      Musculoskeletal: Normal range of motion and neck supple.      Thyroid: No thyromegaly.      Trachea: No tracheal deviation.   Cardiovascular:      Rate and Rhythm: Normal rate.      Heart sounds: Normal heart sounds.   Pulmonary:      Effort: Pulmonary effort is normal. No respiratory distress.      Breath sounds: Normal breath sounds. No wheezing.   Abdominal:      General: Bowel sounds are normal.      Palpations: Abdomen is soft.      Tenderness: There is no abdominal tenderness. There is no rebound.      Hernia: No hernia is present.   Musculoskeletal: Normal range of motion.         General: No tenderness.   Lymphadenopathy:      Cervical: No cervical adenopathy.   Skin:     General: Skin is warm and dry.      Findings: No erythema or rash.   Neurological:      Mental Status: He is alert and oriented to person, place, and time.   Psychiatric:         Behavior: Behavior normal.         Thought Content: Thought content normal.         Judgment: Judgment normal.         Urine dipstick shows negative for all components.  Micro exam: negative for WBC's or RBC's.        Diagnosis Part 1   Prostate needle biopsy (1, submitted as left base lateral):   -No evidence of malignancy     Part 2   Prostate needle biopsy (1, submitted as left base medial):   -No evidence of malignancy     Part 3   Prostate needle biopsy (1, submitted as left mid lateral):   -No evidence of malignancy     Part 4   Prostate needle biopsy (1, submitted as left mid medial):   -No evidence of malignancy     Part 5   Prostate needle biopsy (1, submitted as left apex lateral):   -No evidence of malignancy     Part 6   Prostate needle biopsy (1, submitted as left apex medial):   -No evidence of " malignancy     Part 7   Prostate needle biopsy (1, submitted as right base lateral):   -No evidence of malignancy   -Chronic inflammation     Part 8   Prostate needle biopsy (1, submitted as right base medial):   -No evidence of malignancy     Part 9   Prostate needle biopsy (1, submitted as right mid lateral):   -Adenocarcinoma, [place] patterns 4-3, pattern score 7, group 3, occupying 5%   of a 14 mm biopsy     Part 10   Prostate needle biopsy (1, submitted as right mid medial):   -Adenocarcinoma, Richie patterns 4-3, pattern score 7, group 3, occupying 1%   of a 13 mm biopsy     Part 11   Prostate needle biopsy (1, submitted as right apex lateral):   -Adenocarcinoma, [place] patterns 4-3, pattern score 7, group 3, occupying 2%   of a 12 mm biopsy     Part 12   Prostate needle biopsy (1, submitted as right apex medial):   -Adenocarcinoma, Richie patterns 4-3, pattern score 7, group 3, occupying 2%   of a 13 mm biopsy          Assessment:       1. Prostate cancer    2. Other male erectile dysfunction - controlled on PRN Viagra    3. Nocturia          Plan:       1. Prostate cancer  He will likely need treatment at some point.    I recommend an anniversary biopsy.  Cipro ordered    He understands that a prostate biopsy is indicated for definitive diagnosis of prostate cancer. Risks, benefits, and alternative of TRUS PBx were discussed thoroughly. Risks include, but are not limited to, pain, bleeding, infection, and sepsis. His pre-procedure regimen would require enema the morning of PBx and appropriate PO antibiotics for 3 days starting the day prior to procedure. He will also receive IM injection of antibiotics immediately before the procedure. He understands even after a prostate biopsy, prostate cancer can be missed and close follow up is necessary, with possible further imaging and/or repeat biopsy in the future.      He was not able to see someone at [facility]  I have offered to have him get a second opinion.   He did not see Dr. Larsen  He would like to look into cryoablation.   He does not want radiation.    2. Other male erectile dysfunction - controlled on PRN Viagra  Viagra    3. Nocturia  Limit evening fluids    4. Incomplete bladder emptying              Follow up for Follow up, Prostate Biopsy.

## 2020-12-11 ENCOUNTER — PATIENT MESSAGE (OUTPATIENT)
Dept: OTHER | Facility: OTHER | Age: 70
End: 2020-12-11

## 2020-12-31 ENCOUNTER — TELEPHONE (OUTPATIENT)
Dept: UROLOGY | Facility: CLINIC | Age: 70
End: 2020-12-31

## 2020-12-31 DIAGNOSIS — C61 PROSTATE CANCER: Primary | ICD-10-CM

## 2020-12-31 RX ORDER — CIPROFLOXACIN 500 MG/1
500 TABLET ORAL 2 TIMES DAILY
Qty: 6 TABLET | Refills: 0 | Status: SHIPPED | OUTPATIENT
Start: 2020-12-31 | End: 2021-01-03

## 2020-12-31 NOTE — TELEPHONE ENCOUNTER
Spoke to pt he states he needs an rx for cipro to be sent to [facility] pharmacy pt didn't receive for trus/bx.-david

## 2021-01-04 ENCOUNTER — PROCEDURE VISIT (OUTPATIENT)
Dept: UROLOGY | Facility: CLINIC | Age: 71
End: 2021-01-04
Attending: UROLOGY
Payer: MEDICARE

## 2021-01-04 ENCOUNTER — TELEPHONE (OUTPATIENT)
Dept: UROLOGY | Facility: CLINIC | Age: 71
End: 2021-01-04

## 2021-01-04 DIAGNOSIS — C61 PROSTATE CANCER: Primary | ICD-10-CM

## 2021-01-04 PROCEDURE — 96372 PR INJECTION,THERAP/PROPH/DIAG2ST, IM OR SUBCUT: ICD-10-PCS | Mod: HCNC,59,S$GLB, | Performed by: UROLOGY

## 2021-01-04 PROCEDURE — 76872 TRUS: ICD-10-PCS | Mod: 26,HCNC,S$GLB, | Performed by: UROLOGY

## 2021-01-04 PROCEDURE — 88305 TISSUE EXAM BY PATHOLOGIST: CPT | Mod: 26,HCNC,, | Performed by: PATHOLOGY

## 2021-01-04 PROCEDURE — 55700 TRUS: CPT | Mod: HCNC,S$GLB,, | Performed by: UROLOGY

## 2021-01-04 PROCEDURE — 96372 THER/PROPH/DIAG INJ SC/IM: CPT | Mod: HCNC,59,S$GLB, | Performed by: UROLOGY

## 2021-01-04 PROCEDURE — 55700 TRUS: ICD-10-PCS | Mod: HCNC,S$GLB,, | Performed by: UROLOGY

## 2021-01-04 PROCEDURE — 88305 TISSUE EXAM BY PATHOLOGIST: ICD-10-PCS | Mod: 26,HCNC,, | Performed by: PATHOLOGY

## 2021-01-04 PROCEDURE — 88305 TISSUE EXAM BY PATHOLOGIST: CPT | Mod: HCNC | Performed by: PATHOLOGY

## 2021-01-04 PROCEDURE — 76872 US TRANSRECTAL: CPT | Mod: 26,HCNC,S$GLB, | Performed by: UROLOGY

## 2021-01-04 RX ORDER — GENTAMICIN SULFATE 40 MG/ML
80 INJECTION, SOLUTION INTRAMUSCULAR; INTRAVENOUS
Status: COMPLETED | OUTPATIENT
Start: 2021-01-04 | End: 2021-01-04

## 2021-01-04 RX ADMIN — GENTAMICIN SULFATE 80 MG: 40 INJECTION, SOLUTION INTRAMUSCULAR; INTRAVENOUS at 03:01

## 2021-01-05 ENCOUNTER — PATIENT MESSAGE (OUTPATIENT)
Dept: ADMINISTRATIVE | Facility: HOSPITAL | Age: 71
End: 2021-01-05

## 2021-01-07 LAB
FINAL PATHOLOGIC DIAGNOSIS: NORMAL
GROSS: NORMAL

## 2021-02-03 ENCOUNTER — OFFICE VISIT (OUTPATIENT)
Dept: UROLOGY | Facility: CLINIC | Age: 71
End: 2021-02-03
Payer: MEDICARE

## 2021-02-03 VITALS — WEIGHT: 175.25 LBS | BODY MASS INDEX: 25.09 KG/M2 | HEIGHT: 70 IN

## 2021-02-03 DIAGNOSIS — C61 PROSTATE CANCER: Primary | ICD-10-CM

## 2021-02-03 DIAGNOSIS — I10 HYPERTENSION: ICD-10-CM

## 2021-02-03 LAB
BILIRUB SERPL-MCNC: NORMAL MG/DL
BLOOD URINE, POC: NORMAL
COLOR, POC UA: YELLOW
GLUCOSE UR QL STRIP: NORMAL
KETONES UR QL STRIP: NORMAL
LEUKOCYTE ESTERASE URINE, POC: NORMAL
NITRITE, POC UA: NORMAL
PH, POC UA: 6
PROTEIN, POC: NORMAL
SPECIFIC GRAVITY, POC UA: 1015
UROBILINOGEN, POC UA: NORMAL

## 2021-02-03 PROCEDURE — 3288F FALL RISK ASSESSMENT DOCD: CPT | Mod: HCNC,CPTII,S$GLB, | Performed by: UROLOGY

## 2021-02-03 PROCEDURE — 3008F BODY MASS INDEX DOCD: CPT | Mod: HCNC,CPTII,S$GLB, | Performed by: UROLOGY

## 2021-02-03 PROCEDURE — 1126F PR PAIN SEVERITY QUANTIFIED, NO PAIN PRESENT: ICD-10-PCS | Mod: HCNC,S$GLB,, | Performed by: UROLOGY

## 2021-02-03 PROCEDURE — 99214 OFFICE O/P EST MOD 30 MIN: CPT | Mod: HCNC,25,S$GLB, | Performed by: UROLOGY

## 2021-02-03 PROCEDURE — 1159F PR MEDICATION LIST DOCUMENTED IN MEDICAL RECORD: ICD-10-PCS | Mod: HCNC,S$GLB,, | Performed by: UROLOGY

## 2021-02-03 PROCEDURE — 99999 PR PBB SHADOW E&M-EST. PATIENT-LVL III: CPT | Mod: PBBFAC,HCNC,, | Performed by: UROLOGY

## 2021-02-03 PROCEDURE — 99499 UNLISTED E&M SERVICE: CPT | Mod: HCNC,S$GLB,, | Performed by: UROLOGY

## 2021-02-03 PROCEDURE — 1159F MED LIST DOCD IN RCRD: CPT | Mod: HCNC,S$GLB,, | Performed by: UROLOGY

## 2021-02-03 PROCEDURE — 99999 PR PBB SHADOW E&M-EST. PATIENT-LVL III: ICD-10-PCS | Mod: PBBFAC,HCNC,, | Performed by: UROLOGY

## 2021-02-03 PROCEDURE — 99499 RISK ADDL DX/OHS AUDIT: ICD-10-PCS | Mod: HCNC,S$GLB,, | Performed by: UROLOGY

## 2021-02-03 PROCEDURE — 81001 POCT URINALYSIS, DIPSTICK OR TABLET REAGENT, AUTOMATED, WITH MICROSCOP: ICD-10-PCS | Mod: HCNC,S$GLB,, | Performed by: UROLOGY

## 2021-02-03 PROCEDURE — 81001 URINALYSIS AUTO W/SCOPE: CPT | Mod: HCNC,S$GLB,, | Performed by: UROLOGY

## 2021-02-03 PROCEDURE — 3008F PR BODY MASS INDEX (BMI) DOCUMENTED: ICD-10-PCS | Mod: HCNC,CPTII,S$GLB, | Performed by: UROLOGY

## 2021-02-03 PROCEDURE — 1126F AMNT PAIN NOTED NONE PRSNT: CPT | Mod: HCNC,S$GLB,, | Performed by: UROLOGY

## 2021-02-03 PROCEDURE — 1101F PR PT FALLS ASSESS DOC 0-1 FALLS W/OUT INJ PAST YR: ICD-10-PCS | Mod: HCNC,CPTII,S$GLB, | Performed by: UROLOGY

## 2021-02-03 PROCEDURE — 3288F PR FALLS RISK ASSESSMENT DOCUMENTED: ICD-10-PCS | Mod: HCNC,CPTII,S$GLB, | Performed by: UROLOGY

## 2021-02-03 PROCEDURE — 1101F PT FALLS ASSESS-DOCD LE1/YR: CPT | Mod: HCNC,CPTII,S$GLB, | Performed by: UROLOGY

## 2021-02-03 PROCEDURE — 99214 PR OFFICE/OUTPT VISIT, EST, LEVL IV, 30-39 MIN: ICD-10-PCS | Mod: HCNC,25,S$GLB, | Performed by: UROLOGY

## 2021-04-06 ENCOUNTER — PATIENT MESSAGE (OUTPATIENT)
Dept: ADMINISTRATIVE | Facility: HOSPITAL | Age: 71
End: 2021-04-06

## 2021-04-22 ENCOUNTER — TELEPHONE (OUTPATIENT)
Dept: HEPATOLOGY | Facility: CLINIC | Age: 71
End: 2021-04-22

## 2021-04-22 ENCOUNTER — OFFICE VISIT (OUTPATIENT)
Dept: FAMILY MEDICINE | Facility: CLINIC | Age: 71
End: 2021-04-22
Payer: MEDICARE

## 2021-04-22 VITALS
BODY MASS INDEX: 26.01 KG/M2 | WEIGHT: 181.69 LBS | SYSTOLIC BLOOD PRESSURE: 137 MMHG | RESPIRATION RATE: 18 BRPM | OXYGEN SATURATION: 99 % | HEIGHT: 70 IN | HEART RATE: 72 BPM | DIASTOLIC BLOOD PRESSURE: 82 MMHG

## 2021-04-22 DIAGNOSIS — N40.1 BPH WITH OBSTRUCTION/LOWER URINARY TRACT SYMPTOMS: ICD-10-CM

## 2021-04-22 DIAGNOSIS — G47.09 OTHER INSOMNIA: ICD-10-CM

## 2021-04-22 DIAGNOSIS — N13.8 BPH WITH OBSTRUCTION/LOWER URINARY TRACT SYMPTOMS: ICD-10-CM

## 2021-04-22 DIAGNOSIS — I10 ESSENTIAL HYPERTENSION: ICD-10-CM

## 2021-04-22 DIAGNOSIS — Z00.00 ANNUAL PHYSICAL EXAM: Primary | ICD-10-CM

## 2021-04-22 DIAGNOSIS — C61 PROSTATE CANCER: ICD-10-CM

## 2021-04-22 DIAGNOSIS — B18.2 CHRONIC HEPATITIS C WITHOUT HEPATIC COMA: ICD-10-CM

## 2021-04-22 DIAGNOSIS — E66.3 OVERWEIGHT (BMI 25.0-29.9): ICD-10-CM

## 2021-04-22 DIAGNOSIS — R35.1 NOCTURIA: ICD-10-CM

## 2021-04-22 PROCEDURE — 99499 UNLISTED E&M SERVICE: CPT | Mod: S$GLB,,, | Performed by: FAMILY MEDICINE

## 2021-04-22 PROCEDURE — 3288F FALL RISK ASSESSMENT DOCD: CPT | Mod: CPTII,S$GLB,, | Performed by: FAMILY MEDICINE

## 2021-04-22 PROCEDURE — 99397 PR PREVENTIVE VISIT,EST,65 & OVER: ICD-10-PCS | Mod: S$GLB,,, | Performed by: FAMILY MEDICINE

## 2021-04-22 PROCEDURE — 99999 PR PBB SHADOW E&M-EST. PATIENT-LVL IV: CPT | Mod: PBBFAC,,, | Performed by: FAMILY MEDICINE

## 2021-04-22 PROCEDURE — 99397 PER PM REEVAL EST PAT 65+ YR: CPT | Mod: S$GLB,,, | Performed by: FAMILY MEDICINE

## 2021-04-22 PROCEDURE — 3288F PR FALLS RISK ASSESSMENT DOCUMENTED: ICD-10-PCS | Mod: CPTII,S$GLB,, | Performed by: FAMILY MEDICINE

## 2021-04-22 PROCEDURE — 99499 RISK ADDL DX/OHS AUDIT: ICD-10-PCS | Mod: S$GLB,,, | Performed by: FAMILY MEDICINE

## 2021-04-22 PROCEDURE — 1101F PR PT FALLS ASSESS DOC 0-1 FALLS W/OUT INJ PAST YR: ICD-10-PCS | Mod: CPTII,S$GLB,, | Performed by: FAMILY MEDICINE

## 2021-04-22 PROCEDURE — 3008F BODY MASS INDEX DOCD: CPT | Mod: CPTII,S$GLB,, | Performed by: FAMILY MEDICINE

## 2021-04-22 PROCEDURE — 3008F PR BODY MASS INDEX (BMI) DOCUMENTED: ICD-10-PCS | Mod: CPTII,S$GLB,, | Performed by: FAMILY MEDICINE

## 2021-04-22 PROCEDURE — 1101F PT FALLS ASSESS-DOCD LE1/YR: CPT | Mod: CPTII,S$GLB,, | Performed by: FAMILY MEDICINE

## 2021-04-22 PROCEDURE — 99999 PR PBB SHADOW E&M-EST. PATIENT-LVL IV: ICD-10-PCS | Mod: PBBFAC,,, | Performed by: FAMILY MEDICINE

## 2021-05-04 ENCOUNTER — LAB VISIT (OUTPATIENT)
Dept: LAB | Facility: HOSPITAL | Age: 71
End: 2021-05-04
Attending: UROLOGY
Payer: MEDICARE

## 2021-05-04 DIAGNOSIS — I10 ESSENTIAL HYPERTENSION: ICD-10-CM

## 2021-05-04 DIAGNOSIS — B18.2 CHRONIC HEPATITIS C WITHOUT HEPATIC COMA: ICD-10-CM

## 2021-05-04 DIAGNOSIS — C61 PROSTATE CANCER: ICD-10-CM

## 2021-05-04 DIAGNOSIS — Z00.00 ANNUAL PHYSICAL EXAM: ICD-10-CM

## 2021-05-04 DIAGNOSIS — I10 HYPERTENSION: ICD-10-CM

## 2021-05-04 LAB
ERYTHROCYTE [DISTWIDTH] IN BLOOD BY AUTOMATED COUNT: 13.4 % (ref 11.5–14.5)
HCT VFR BLD AUTO: 42.4 % (ref 40–54)
HGB BLD-MCNC: 13.8 G/DL (ref 14–18)
MCH RBC QN AUTO: 32.8 PG (ref 27–31)
MCHC RBC AUTO-ENTMCNC: 32.5 G/DL (ref 32–36)
MCV RBC AUTO: 101 FL (ref 82–98)
PLATELET # BLD AUTO: 268 K/UL (ref 150–450)
PMV BLD AUTO: 10.9 FL (ref 9.2–12.9)
RBC # BLD AUTO: 4.21 M/UL (ref 4.6–6.2)
WBC # BLD AUTO: 4.97 K/UL (ref 3.9–12.7)

## 2021-05-04 PROCEDURE — 85027 COMPLETE CBC AUTOMATED: CPT | Performed by: FAMILY MEDICINE

## 2021-05-04 PROCEDURE — 80061 LIPID PANEL: CPT | Performed by: FAMILY MEDICINE

## 2021-05-04 PROCEDURE — 84153 ASSAY OF PSA TOTAL: CPT | Performed by: UROLOGY

## 2021-05-04 PROCEDURE — 80053 COMPREHEN METABOLIC PANEL: CPT | Performed by: FAMILY MEDICINE

## 2021-05-04 PROCEDURE — 87522 HEPATITIS C REVRS TRNSCRPJ: CPT | Performed by: FAMILY MEDICINE

## 2021-05-04 PROCEDURE — 36415 COLL VENOUS BLD VENIPUNCTURE: CPT | Mod: PO | Performed by: FAMILY MEDICINE

## 2021-05-05 LAB
ALBUMIN SERPL BCP-MCNC: 4.4 G/DL (ref 3.5–5.2)
ALBUMIN SERPL BCP-MCNC: 4.4 G/DL (ref 3.5–5.2)
ALP SERPL-CCNC: 54 U/L (ref 55–135)
ALP SERPL-CCNC: 54 U/L (ref 55–135)
ALT SERPL W/O P-5'-P-CCNC: 25 U/L (ref 10–44)
ALT SERPL W/O P-5'-P-CCNC: 25 U/L (ref 10–44)
ANION GAP SERPL CALC-SCNC: 9 MMOL/L (ref 8–16)
ANION GAP SERPL CALC-SCNC: 9 MMOL/L (ref 8–16)
AST SERPL-CCNC: 27 U/L (ref 10–40)
AST SERPL-CCNC: 27 U/L (ref 10–40)
BILIRUB SERPL-MCNC: 1.2 MG/DL (ref 0.1–1)
BILIRUB SERPL-MCNC: 1.2 MG/DL (ref 0.1–1)
BUN SERPL-MCNC: 15 MG/DL (ref 8–23)
BUN SERPL-MCNC: 15 MG/DL (ref 8–23)
CALCIUM SERPL-MCNC: 9.3 MG/DL (ref 8.7–10.5)
CALCIUM SERPL-MCNC: 9.3 MG/DL (ref 8.7–10.5)
CHLORIDE SERPL-SCNC: 106 MMOL/L (ref 95–110)
CHLORIDE SERPL-SCNC: 106 MMOL/L (ref 95–110)
CHOLEST SERPL-MCNC: 170 MG/DL (ref 120–199)
CHOLEST/HDLC SERPL: 2.9 {RATIO} (ref 2–5)
CO2 SERPL-SCNC: 25 MMOL/L (ref 23–29)
CO2 SERPL-SCNC: 25 MMOL/L (ref 23–29)
COMPLEXED PSA SERPL-MCNC: 9.3 NG/ML (ref 0–4)
CREAT SERPL-MCNC: 0.8 MG/DL (ref 0.5–1.4)
CREAT SERPL-MCNC: 0.8 MG/DL (ref 0.5–1.4)
EST. GFR  (AFRICAN AMERICAN): >60 ML/MIN/1.73 M^2
EST. GFR  (AFRICAN AMERICAN): >60 ML/MIN/1.73 M^2
EST. GFR  (NON AFRICAN AMERICAN): >60 ML/MIN/1.73 M^2
EST. GFR  (NON AFRICAN AMERICAN): >60 ML/MIN/1.73 M^2
GLUCOSE SERPL-MCNC: 85 MG/DL (ref 70–110)
GLUCOSE SERPL-MCNC: 85 MG/DL (ref 70–110)
HDLC SERPL-MCNC: 59 MG/DL (ref 40–75)
HDLC SERPL: 34.7 % (ref 20–50)
LDLC SERPL CALC-MCNC: 98.2 MG/DL (ref 63–159)
NONHDLC SERPL-MCNC: 111 MG/DL
POTASSIUM SERPL-SCNC: 4.3 MMOL/L (ref 3.5–5.1)
POTASSIUM SERPL-SCNC: 4.3 MMOL/L (ref 3.5–5.1)
PROT SERPL-MCNC: 7.3 G/DL (ref 6–8.4)
PROT SERPL-MCNC: 7.3 G/DL (ref 6–8.4)
SODIUM SERPL-SCNC: 140 MMOL/L (ref 136–145)
SODIUM SERPL-SCNC: 140 MMOL/L (ref 136–145)
TRIGL SERPL-MCNC: 64 MG/DL (ref 30–150)

## 2021-05-06 LAB — HEPATITIS C VIRUS (HCV) RNA DETECTION/QUANTIFICATION RT-PCR: ABNORMAL IU/ML

## 2021-05-12 ENCOUNTER — OFFICE VISIT (OUTPATIENT)
Dept: UROLOGY | Facility: CLINIC | Age: 71
End: 2021-05-12
Payer: MEDICARE

## 2021-05-12 VITALS — HEIGHT: 70 IN | WEIGHT: 179.88 LBS | BODY MASS INDEX: 25.75 KG/M2

## 2021-05-12 DIAGNOSIS — N52.8 OTHER MALE ERECTILE DYSFUNCTION: ICD-10-CM

## 2021-05-12 DIAGNOSIS — R35.1 NOCTURIA: ICD-10-CM

## 2021-05-12 DIAGNOSIS — C61 PROSTATE CANCER: Primary | ICD-10-CM

## 2021-05-12 PROCEDURE — 1159F PR MEDICATION LIST DOCUMENTED IN MEDICAL RECORD: ICD-10-PCS | Mod: S$GLB,,, | Performed by: UROLOGY

## 2021-05-12 PROCEDURE — 81001 PR  URINALYSIS, AUTO, W/SCOPE: ICD-10-PCS | Mod: S$GLB,,, | Performed by: UROLOGY

## 2021-05-12 PROCEDURE — 99214 OFFICE O/P EST MOD 30 MIN: CPT | Mod: 25,S$GLB,, | Performed by: UROLOGY

## 2021-05-12 PROCEDURE — 1101F PR PT FALLS ASSESS DOC 0-1 FALLS W/OUT INJ PAST YR: ICD-10-PCS | Mod: CPTII,S$GLB,, | Performed by: UROLOGY

## 2021-05-12 PROCEDURE — 3288F PR FALLS RISK ASSESSMENT DOCUMENTED: ICD-10-PCS | Mod: CPTII,S$GLB,, | Performed by: UROLOGY

## 2021-05-12 PROCEDURE — 3288F FALL RISK ASSESSMENT DOCD: CPT | Mod: CPTII,S$GLB,, | Performed by: UROLOGY

## 2021-05-12 PROCEDURE — 1101F PT FALLS ASSESS-DOCD LE1/YR: CPT | Mod: CPTII,S$GLB,, | Performed by: UROLOGY

## 2021-05-12 PROCEDURE — 3008F BODY MASS INDEX DOCD: CPT | Mod: CPTII,S$GLB,, | Performed by: UROLOGY

## 2021-05-12 PROCEDURE — 1126F PR PAIN SEVERITY QUANTIFIED, NO PAIN PRESENT: ICD-10-PCS | Mod: S$GLB,,, | Performed by: UROLOGY

## 2021-05-12 PROCEDURE — 1159F MED LIST DOCD IN RCRD: CPT | Mod: S$GLB,,, | Performed by: UROLOGY

## 2021-05-12 PROCEDURE — 81001 URINALYSIS AUTO W/SCOPE: CPT | Mod: S$GLB,,, | Performed by: UROLOGY

## 2021-05-12 PROCEDURE — 99999 PR PBB SHADOW E&M-EST. PATIENT-LVL III: CPT | Mod: PBBFAC,,, | Performed by: UROLOGY

## 2021-05-12 PROCEDURE — 3008F PR BODY MASS INDEX (BMI) DOCUMENTED: ICD-10-PCS | Mod: CPTII,S$GLB,, | Performed by: UROLOGY

## 2021-05-12 PROCEDURE — 99214 PR OFFICE/OUTPT VISIT, EST, LEVL IV, 30-39 MIN: ICD-10-PCS | Mod: 25,S$GLB,, | Performed by: UROLOGY

## 2021-05-12 PROCEDURE — 99999 PR PBB SHADOW E&M-EST. PATIENT-LVL III: ICD-10-PCS | Mod: PBBFAC,,, | Performed by: UROLOGY

## 2021-05-12 PROCEDURE — 1126F AMNT PAIN NOTED NONE PRSNT: CPT | Mod: S$GLB,,, | Performed by: UROLOGY

## 2021-05-25 ENCOUNTER — OFFICE VISIT (OUTPATIENT)
Dept: UROLOGY | Facility: CLINIC | Age: 71
End: 2021-05-25
Payer: MEDICARE

## 2021-05-25 VITALS
SYSTOLIC BLOOD PRESSURE: 154 MMHG | HEART RATE: 67 BPM | HEIGHT: 70 IN | BODY MASS INDEX: 25.75 KG/M2 | DIASTOLIC BLOOD PRESSURE: 77 MMHG | WEIGHT: 179.88 LBS

## 2021-05-25 DIAGNOSIS — C61 PROSTATE CANCER: Primary | ICD-10-CM

## 2021-05-25 PROBLEM — N13.8 BPH WITH OBSTRUCTION/LOWER URINARY TRACT SYMPTOMS: Status: RESOLVED | Noted: 2018-06-26 | Resolved: 2021-05-25

## 2021-05-25 PROBLEM — N40.1 BPH WITH OBSTRUCTION/LOWER URINARY TRACT SYMPTOMS: Status: RESOLVED | Noted: 2018-06-26 | Resolved: 2021-05-25

## 2021-05-25 PROCEDURE — 1126F PR PAIN SEVERITY QUANTIFIED, NO PAIN PRESENT: ICD-10-PCS | Mod: S$GLB,,, | Performed by: UROLOGY

## 2021-05-25 PROCEDURE — 1159F MED LIST DOCD IN RCRD: CPT | Mod: S$GLB,,, | Performed by: UROLOGY

## 2021-05-25 PROCEDURE — 1101F PT FALLS ASSESS-DOCD LE1/YR: CPT | Mod: CPTII,S$GLB,, | Performed by: UROLOGY

## 2021-05-25 PROCEDURE — 1126F AMNT PAIN NOTED NONE PRSNT: CPT | Mod: S$GLB,,, | Performed by: UROLOGY

## 2021-05-25 PROCEDURE — 1101F PR PT FALLS ASSESS DOC 0-1 FALLS W/OUT INJ PAST YR: ICD-10-PCS | Mod: CPTII,S$GLB,, | Performed by: UROLOGY

## 2021-05-25 PROCEDURE — 99215 OFFICE O/P EST HI 40 MIN: CPT | Mod: S$GLB,,, | Performed by: UROLOGY

## 2021-05-25 PROCEDURE — 99999 PR PBB SHADOW E&M-EST. PATIENT-LVL III: ICD-10-PCS | Mod: PBBFAC,,, | Performed by: UROLOGY

## 2021-05-25 PROCEDURE — 1159F PR MEDICATION LIST DOCUMENTED IN MEDICAL RECORD: ICD-10-PCS | Mod: S$GLB,,, | Performed by: UROLOGY

## 2021-05-25 PROCEDURE — 99215 PR OFFICE/OUTPT VISIT, EST, LEVL V, 40-54 MIN: ICD-10-PCS | Mod: S$GLB,,, | Performed by: UROLOGY

## 2021-05-25 PROCEDURE — 3288F PR FALLS RISK ASSESSMENT DOCUMENTED: ICD-10-PCS | Mod: CPTII,S$GLB,, | Performed by: UROLOGY

## 2021-05-25 PROCEDURE — 3008F PR BODY MASS INDEX (BMI) DOCUMENTED: ICD-10-PCS | Mod: CPTII,S$GLB,, | Performed by: UROLOGY

## 2021-05-25 PROCEDURE — 99999 PR PBB SHADOW E&M-EST. PATIENT-LVL III: CPT | Mod: PBBFAC,,, | Performed by: UROLOGY

## 2021-05-25 PROCEDURE — 3288F FALL RISK ASSESSMENT DOCD: CPT | Mod: CPTII,S$GLB,, | Performed by: UROLOGY

## 2021-05-25 PROCEDURE — 3008F BODY MASS INDEX DOCD: CPT | Mod: CPTII,S$GLB,, | Performed by: UROLOGY

## 2021-06-02 ENCOUNTER — TELEPHONE (OUTPATIENT)
Dept: FAMILY MEDICINE | Facility: CLINIC | Age: 71
End: 2021-06-02

## 2021-06-04 ENCOUNTER — OFFICE VISIT (OUTPATIENT)
Dept: URGENT CARE | Facility: CLINIC | Age: 71
End: 2021-06-04
Payer: MEDICARE

## 2021-06-04 VITALS
BODY MASS INDEX: 24.77 KG/M2 | HEIGHT: 70 IN | HEART RATE: 73 BPM | WEIGHT: 173 LBS | TEMPERATURE: 98 F | SYSTOLIC BLOOD PRESSURE: 148 MMHG | RESPIRATION RATE: 18 BRPM | OXYGEN SATURATION: 98 % | DIASTOLIC BLOOD PRESSURE: 76 MMHG
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
## 2021-06-04 PROCEDURE — 99214 PR OFFICE/OUTPT VISIT, EST, LEVL IV, 30-39 MIN: ICD-10-PCS | Mod: S$GLB,,, | Performed by: PHYSICIAN ASSISTANT

## 2021-06-04 PROCEDURE — 99214 OFFICE O/P EST MOD 30 MIN: CPT | Mod: S$GLB,,, | Performed by: PHYSICIAN ASSISTANT

## 2021-06-04 PROCEDURE — 3008F PR BODY MASS INDEX (BMI) DOCUMENTED: ICD-10-PCS | Mod: CPTII,S$GLB,, | Performed by: PHYSICIAN ASSISTANT

## 2021-06-04 PROCEDURE — 3008F BODY MASS INDEX DOCD: CPT | Mod: CPTII,S$GLB,, | Performed by: PHYSICIAN ASSISTANT

## 2021-06-04 RX ORDER — MUPIROCIN 20 MG/G
OINTMENT TOPICAL
Qty: 22 G | Refills: 1 | Status: ON HOLD | OUTPATIENT
Start: 2021-06-04 | End: 2021-07-06 | Stop reason: CLARIF

## 2021-06-04 RX ORDER — DOXYCYCLINE HYCLATE 100 MG
200 TABLET ORAL DAILY
Qty: 2 TABLET | Refills: 0 | Status: SHIPPED | OUTPATIENT
Start: 2021-06-04 | End: 2021-06-05

## 2021-06-07 ENCOUNTER — TELEPHONE (OUTPATIENT)
Dept: UROLOGY | Facility: CLINIC | Age: 71
End: 2021-06-07

## 2021-06-07 DIAGNOSIS — C61 PROSTATE CANCER: Primary | ICD-10-CM

## 2021-06-08 ENCOUNTER — HOSPITAL ENCOUNTER (OUTPATIENT)
Dept: RADIOLOGY | Facility: HOSPITAL | Age: 71
Discharge: HOME | End: 2021-06-08
Attending: INTERNAL MEDICINE
Payer: MEDICARE

## 2021-06-08 ENCOUNTER — TELEPHONE (OUTPATIENT)
Dept: UROLOGY | Facility: CLINIC | Age: 71
End: 2021-06-08

## 2021-06-08 DIAGNOSIS — B18.2 HEPATITIS C, CHRONIC: ICD-10-CM

## 2021-06-08 DIAGNOSIS — D53.9 MACROCYTIC ANEMIA: ICD-10-CM

## 2021-06-08 PROCEDURE — 91200 US ELASTOGRAPHY LIVER: ICD-10-PCS | Mod: 26,,, | Performed by: RADIOLOGY

## 2021-06-08 PROCEDURE — 91200 LIVER ELASTOGRAPHY: CPT | Mod: 26,,, | Performed by: RADIOLOGY

## 2021-06-08 PROCEDURE — 91200 LIVER ELASTOGRAPHY: CPT | Mod: TC

## 2021-06-09 ENCOUNTER — TELEPHONE (OUTPATIENT)
Dept: FAMILY MEDICINE | Facility: CLINIC | Age: 71
End: 2021-06-09

## 2021-06-09 ENCOUNTER — TELEPHONE (OUTPATIENT)
Dept: UROLOGY | Facility: CLINIC | Age: 71
End: 2021-06-09

## 2021-06-09 DIAGNOSIS — K31.9 STOMACH PROBLEMS: Primary | ICD-10-CM

## 2021-06-10 ENCOUNTER — TELEPHONE (OUTPATIENT)
Dept: FAMILY MEDICINE | Facility: CLINIC | Age: 71
End: 2021-06-10
Payer: MEDICARE

## 2021-06-10 NOTE — TELEPHONE ENCOUNTER
Spoke to patient regarding a referral to Gastroenterology, patient states he will call back once he talks to his insurance company to see if it's covered

## 2021-06-11 ENCOUNTER — LAB VISIT (OUTPATIENT)
Dept: FAMILY MEDICINE | Facility: CLINIC | Age: 71
End: 2021-06-11
Payer: MEDICARE

## 2021-06-11 DIAGNOSIS — Z01.818 PRE-OP TESTING: ICD-10-CM

## 2021-06-11 LAB — SARS-COV-2 RNA RESP QL NAA+PROBE: NOT DETECTED

## 2021-06-11 PROCEDURE — U0003 INFECTIOUS AGENT DETECTION BY NUCLEIC ACID (DNA OR RNA); SEVERE ACUTE RESPIRATORY SYNDROME CORONAVIRUS 2 (SARS-COV-2) (CORONAVIRUS DISEASE [COVID-19]), AMPLIFIED PROBE TECHNIQUE, MAKING USE OF HIGH THROUGHPUT TECHNOLOGIES AS DESCRIBED BY CMS-2020-01-R: HCPCS | Performed by: INTERNAL MEDICINE

## 2021-06-11 PROCEDURE — U0005 INFEC AGEN DETEC AMPLI PROBE: HCPCS | Performed by: INTERNAL MEDICINE

## 2021-06-15 ENCOUNTER — TELEPHONE (OUTPATIENT)
Dept: HEPATOLOGY | Facility: CLINIC | Age: 71
End: 2021-06-15

## 2021-06-18 ENCOUNTER — HOSPITAL ENCOUNTER (OUTPATIENT)
Dept: RADIOLOGY | Facility: HOSPITAL | Age: 71
Discharge: HOME | End: 2021-06-18
Attending: UROLOGY
Payer: MEDICARE

## 2021-06-18 DIAGNOSIS — C61 PROSTATE CANCER: ICD-10-CM

## 2021-06-18 PROCEDURE — A9585 GADOBUTROL INJECTION: HCPCS | Performed by: UROLOGY

## 2021-06-18 PROCEDURE — 72197 MRI PELVIS W/O & W/DYE: CPT | Mod: 26,,, | Performed by: RADIOLOGY

## 2021-06-18 PROCEDURE — 25500020 PHARM REV CODE 255: Performed by: UROLOGY

## 2021-06-18 PROCEDURE — 72197 MRI PROSTATE W W/O CONTRAST: ICD-10-PCS | Mod: 26,,, | Performed by: RADIOLOGY

## 2021-06-18 PROCEDURE — 72197 MRI PELVIS W/O & W/DYE: CPT | Mod: TC

## 2021-06-18 RX ORDER — GADOBUTROL 604.72 MG/ML
10 INJECTION INTRAVENOUS
Status: COMPLETED | OUTPATIENT
Start: 2021-06-18 | End: 2021-06-18

## 2021-06-18 RX ADMIN — GADOBUTROL 10 ML: 604.72 INJECTION INTRAVENOUS at 01:06

## 2021-06-24 NOTE — ED NOTES
EMS waiting for completions of imaging discs to transport patient to [facility]  
Imaging CD brought to EMS at this time by radiology tech.  
Pt brought to Radiology department via WC per radiology tech.   
Radiology notified imaging discs needed for patient transport  
full weight-bearing/weight-bearing as tolerated
full weight-bearing

## 2021-06-30 ENCOUNTER — TELEPHONE (OUTPATIENT)
Dept: UROLOGY | Facility: CLINIC | Age: 71
End: 2021-06-30

## 2021-06-30 ENCOUNTER — PATIENT MESSAGE (OUTPATIENT)
Dept: UROLOGY | Facility: CLINIC | Age: 71
End: 2021-06-30

## 2021-06-30 DIAGNOSIS — C61 PROSTATE CANCER: Primary | ICD-10-CM

## 2021-07-02 ENCOUNTER — ANESTHESIA EVENT (OUTPATIENT)
Dept: SURGERY | Facility: HOSPITAL | Age: 71
End: 2021-07-02
Payer: MEDICARE

## 2021-07-02 ENCOUNTER — TELEPHONE (OUTPATIENT)
Dept: FAMILY MEDICINE | Facility: CLINIC | Age: 71
End: 2021-07-02

## 2021-07-03 ENCOUNTER — CLINICAL SUPPORT (OUTPATIENT)
Dept: URGENT CARE | Facility: CLINIC | Age: 71
End: 2021-07-03
Payer: MEDICARE

## 2021-07-03 DIAGNOSIS — C61 PROSTATE CANCER: ICD-10-CM

## 2021-07-03 PROCEDURE — U0003 INFECTIOUS AGENT DETECTION BY NUCLEIC ACID (DNA OR RNA); SEVERE ACUTE RESPIRATORY SYNDROME CORONAVIRUS 2 (SARS-COV-2) (CORONAVIRUS DISEASE [COVID-19]), AMPLIFIED PROBE TECHNIQUE, MAKING USE OF HIGH THROUGHPUT TECHNOLOGIES AS DESCRIBED BY CMS-2020-01-R: HCPCS | Performed by: NURSE PRACTITIONER

## 2021-07-03 PROCEDURE — U0005 INFEC AGEN DETEC AMPLI PROBE: HCPCS | Performed by: NURSE PRACTITIONER

## 2021-07-05 LAB — SARS-COV-2 RNA RESP QL NAA+PROBE: NOT DETECTED

## 2021-07-06 ENCOUNTER — HOSPITAL ENCOUNTER (OUTPATIENT)
Facility: HOSPITAL | Age: 71
Discharge: HOME | End: 2021-07-06
Attending: UROLOGY | Admitting: UROLOGY
Payer: MEDICARE

## 2021-07-06 ENCOUNTER — ANESTHESIA (OUTPATIENT)
Dept: SURGERY | Facility: HOSPITAL | Age: 71
End: 2021-07-06
Payer: MEDICARE

## 2021-07-06 VITALS
OXYGEN SATURATION: 100 % | DIASTOLIC BLOOD PRESSURE: 80 MMHG | HEIGHT: 70 IN | TEMPERATURE: 98 F | BODY MASS INDEX: 25.05 KG/M2 | SYSTOLIC BLOOD PRESSURE: 168 MMHG | HEART RATE: 60 BPM | RESPIRATION RATE: 29 BRPM | WEIGHT: 175 LBS

## 2021-07-06 DIAGNOSIS — Z01.818 PRE-OP TESTING: ICD-10-CM

## 2021-07-06 DIAGNOSIS — C61 PROSTATE CANCER: Primary | ICD-10-CM

## 2021-07-06 PROCEDURE — 63600175 PHARM REV CODE 636 W HCPCS: Performed by: STUDENT IN AN ORGANIZED HEALTH CARE EDUCATION/TRAINING PROGRAM

## 2021-07-06 PROCEDURE — 55880 ABLTJ MAL PRST8 TISS HIFU: CPT | Mod: ,,, | Performed by: UROLOGY

## 2021-07-06 PROCEDURE — 36000707: Performed by: UROLOGY

## 2021-07-06 PROCEDURE — 71000044 HC DOSC ROUTINE RECOVERY FIRST HOUR: Performed by: UROLOGY

## 2021-07-06 PROCEDURE — 71000015 HC POSTOP RECOV 1ST HR: Performed by: UROLOGY

## 2021-07-06 PROCEDURE — 25000003 PHARM REV CODE 250: Performed by: NURSE ANESTHETIST, CERTIFIED REGISTERED

## 2021-07-06 PROCEDURE — 37000008 HC ANESTHESIA 1ST 15 MINUTES: Performed by: UROLOGY

## 2021-07-06 PROCEDURE — D9220A PRA ANESTHESIA: ICD-10-PCS | Mod: ,,, | Performed by: ANESTHESIOLOGY

## 2021-07-06 PROCEDURE — 36000706: Performed by: UROLOGY

## 2021-07-06 PROCEDURE — D9220A PRA ANESTHESIA: Mod: ,,, | Performed by: ANESTHESIOLOGY

## 2021-07-06 PROCEDURE — 25000003 PHARM REV CODE 250: Performed by: STUDENT IN AN ORGANIZED HEALTH CARE EDUCATION/TRAINING PROGRAM

## 2021-07-06 PROCEDURE — 63600175 PHARM REV CODE 636 W HCPCS: Performed by: NURSE ANESTHETIST, CERTIFIED REGISTERED

## 2021-07-06 PROCEDURE — 37000009 HC ANESTHESIA EA ADD 15 MINS: Performed by: UROLOGY

## 2021-07-06 PROCEDURE — 27201423 OPTIME MED/SURG SUP & DEVICES STERILE SUPPLY: Performed by: UROLOGY

## 2021-07-06 PROCEDURE — 55880 PR ABLATION, MALIGNANT PROSTATE TISSUE, W/HIFU: ICD-10-PCS | Mod: ,,, | Performed by: UROLOGY

## 2021-07-06 RX ORDER — FENTANYL CITRATE 50 UG/ML
INJECTION, SOLUTION INTRAMUSCULAR; INTRAVENOUS
Status: DISCONTINUED | OUTPATIENT
Start: 2021-07-06 | End: 2021-07-06

## 2021-07-06 RX ORDER — SULFAMETHOXAZOLE AND TRIMETHOPRIM 800; 160 MG/1; MG/1
1 TABLET ORAL 2 TIMES DAILY
Qty: 20 TABLET | Refills: 0 | Status: SHIPPED | OUTPATIENT
Start: 2021-07-06 | End: 2021-07-16

## 2021-07-06 RX ORDER — CEFTRIAXONE 1 G/1
1 INJECTION, POWDER, FOR SOLUTION INTRAMUSCULAR; INTRAVENOUS
Status: COMPLETED | OUTPATIENT
Start: 2021-07-06 | End: 2021-07-06

## 2021-07-06 RX ORDER — OXYBUTYNIN CHLORIDE 5 MG/1
5 TABLET ORAL 3 TIMES DAILY PRN
Qty: 20 TABLET | Refills: 0 | Status: SHIPPED | OUTPATIENT
Start: 2021-07-06 | End: 2021-07-26

## 2021-07-06 RX ORDER — ACETAMINOPHEN 500 MG
1000 TABLET ORAL
Status: COMPLETED | OUTPATIENT
Start: 2021-07-06 | End: 2021-07-06

## 2021-07-06 RX ORDER — IBUPROFEN 800 MG/1
800 TABLET ORAL 3 TIMES DAILY
Qty: 21 TABLET | Refills: 0 | Status: SHIPPED | OUTPATIENT
Start: 2021-07-06 | End: 2021-08-09

## 2021-07-06 RX ORDER — ONDANSETRON 2 MG/ML
4 INJECTION INTRAMUSCULAR; INTRAVENOUS DAILY PRN
Status: DISCONTINUED | OUTPATIENT
Start: 2021-07-06 | End: 2021-07-06 | Stop reason: HOSPADM

## 2021-07-06 RX ORDER — ONDANSETRON 2 MG/ML
INJECTION INTRAMUSCULAR; INTRAVENOUS
Status: DISCONTINUED | OUTPATIENT
Start: 2021-07-06 | End: 2021-07-06

## 2021-07-06 RX ORDER — PHENYLEPHRINE HYDROCHLORIDE 10 MG/ML
INJECTION INTRAVENOUS
Status: DISCONTINUED | OUTPATIENT
Start: 2021-07-06 | End: 2021-07-06

## 2021-07-06 RX ORDER — KETAMINE HCL IN 0.9 % NACL 50 MG/5 ML
SYRINGE (ML) INTRAVENOUS
Status: COMPLETED
Start: 2021-07-06 | End: 2021-07-06

## 2021-07-06 RX ORDER — ROCURONIUM BROMIDE 10 MG/ML
INJECTION, SOLUTION INTRAVENOUS
Status: DISCONTINUED | OUTPATIENT
Start: 2021-07-06 | End: 2021-07-06

## 2021-07-06 RX ORDER — TAMSULOSIN HYDROCHLORIDE 0.4 MG/1
0.4 CAPSULE ORAL DAILY
Qty: 30 CAPSULE | Refills: 0 | Status: SHIPPED | OUTPATIENT
Start: 2021-07-06 | End: 2023-04-28

## 2021-07-06 RX ORDER — KETAMINE HCL IN 0.9 % NACL 50 MG/5 ML
SYRINGE (ML) INTRAVENOUS
Status: DISCONTINUED | OUTPATIENT
Start: 2021-07-06 | End: 2021-07-06

## 2021-07-06 RX ORDER — MIDAZOLAM HYDROCHLORIDE 1 MG/ML
INJECTION, SOLUTION INTRAMUSCULAR; INTRAVENOUS
Status: DISCONTINUED | OUTPATIENT
Start: 2021-07-06 | End: 2021-07-06

## 2021-07-06 RX ORDER — FENTANYL CITRATE 50 UG/ML
25 INJECTION, SOLUTION INTRAMUSCULAR; INTRAVENOUS EVERY 5 MIN PRN
Status: DISCONTINUED | OUTPATIENT
Start: 2021-07-06 | End: 2021-07-06 | Stop reason: HOSPADM

## 2021-07-06 RX ORDER — METHYLPREDNISOLONE 4 MG/1
TABLET ORAL
Qty: 21 TABLET | Refills: 0 | Status: SHIPPED | OUTPATIENT
Start: 2021-07-06 | End: 2021-07-15

## 2021-07-06 RX ORDER — DEXAMETHASONE SODIUM PHOSPHATE 4 MG/ML
INJECTION, SOLUTION INTRA-ARTICULAR; INTRALESIONAL; INTRAMUSCULAR; INTRAVENOUS; SOFT TISSUE
Status: DISCONTINUED | OUTPATIENT
Start: 2021-07-06 | End: 2021-07-06

## 2021-07-06 RX ORDER — FAMOTIDINE 10 MG/ML
INJECTION INTRAVENOUS
Status: DISCONTINUED | OUTPATIENT
Start: 2021-07-06 | End: 2021-07-06

## 2021-07-06 RX ORDER — LIDOCAINE HYDROCHLORIDE 20 MG/ML
INJECTION INTRAVENOUS
Status: DISCONTINUED | OUTPATIENT
Start: 2021-07-06 | End: 2021-07-06

## 2021-07-06 RX ORDER — SODIUM CHLORIDE 9 MG/ML
INJECTION, SOLUTION INTRAVENOUS CONTINUOUS
Status: DISCONTINUED | OUTPATIENT
Start: 2021-07-06 | End: 2021-07-06 | Stop reason: HOSPADM

## 2021-07-06 RX ORDER — PROPOFOL 10 MG/ML
VIAL (ML) INTRAVENOUS
Status: DISCONTINUED | OUTPATIENT
Start: 2021-07-06 | End: 2021-07-06

## 2021-07-06 RX ORDER — LIDOCAINE HYDROCHLORIDE 10 MG/ML
1 INJECTION, SOLUTION EPIDURAL; INFILTRATION; INTRACAUDAL; PERINEURAL ONCE
Status: DISCONTINUED | OUTPATIENT
Start: 2021-07-06 | End: 2021-07-06 | Stop reason: HOSPADM

## 2021-07-06 RX ORDER — SODIUM CHLORIDE 0.9 % (FLUSH) 0.9 %
3 SYRINGE (ML) INJECTION EVERY 30 MIN PRN
Status: DISCONTINUED | OUTPATIENT
Start: 2021-07-06 | End: 2021-07-06 | Stop reason: HOSPADM

## 2021-07-06 RX ORDER — ACETAMINOPHEN 10 MG/ML
INJECTION, SOLUTION INTRAVENOUS
Status: DISCONTINUED
Start: 2021-07-06 | End: 2021-07-06 | Stop reason: WASHOUT

## 2021-07-06 RX ADMIN — SUGAMMADEX 200 MG: 100 INJECTION, SOLUTION INTRAVENOUS at 09:07

## 2021-07-06 RX ADMIN — PROPOFOL 150 MG: 10 INJECTION, EMULSION INTRAVENOUS at 08:07

## 2021-07-06 RX ADMIN — CEFTRIAXONE 1 G: 1 INJECTION, POWDER, FOR SOLUTION INTRAMUSCULAR; INTRAVENOUS at 08:07

## 2021-07-06 RX ADMIN — PHENYLEPHRINE HYDROCHLORIDE 200 MCG: 10 INJECTION INTRAVENOUS at 09:07

## 2021-07-06 RX ADMIN — PHENYLEPHRINE HYDROCHLORIDE 100 MCG: 10 INJECTION INTRAVENOUS at 09:07

## 2021-07-06 RX ADMIN — Medication 25 MG: at 08:07

## 2021-07-06 RX ADMIN — LIDOCAINE HYDROCHLORIDE 100 MG: 20 INJECTION, SOLUTION INTRAVENOUS at 08:07

## 2021-07-06 RX ADMIN — ROCURONIUM BROMIDE 50 MG: 10 INJECTION, SOLUTION INTRAVENOUS at 08:07

## 2021-07-06 RX ADMIN — ONDANSETRON 4 MG: 2 INJECTION INTRAMUSCULAR; INTRAVENOUS at 08:07

## 2021-07-06 RX ADMIN — SODIUM CHLORIDE: 0.9 INJECTION, SOLUTION INTRAVENOUS at 08:07

## 2021-07-06 RX ADMIN — PROPOFOL 50 MG: 10 INJECTION, EMULSION INTRAVENOUS at 09:07

## 2021-07-06 RX ADMIN — FENTANYL CITRATE 75 MCG: 50 INJECTION, SOLUTION INTRAMUSCULAR; INTRAVENOUS at 08:07

## 2021-07-06 RX ADMIN — DEXAMETHASONE SODIUM PHOSPHATE 4 MG: 4 INJECTION, SOLUTION INTRAMUSCULAR; INTRAVENOUS at 08:07

## 2021-07-06 RX ADMIN — FAMOTIDINE 20 MG: 10 INJECTION, SOLUTION INTRAVENOUS at 08:07

## 2021-07-06 RX ADMIN — MIDAZOLAM HYDROCHLORIDE 2 MG: 1 INJECTION, SOLUTION INTRAMUSCULAR; INTRAVENOUS at 08:07

## 2021-07-06 RX ADMIN — ACETAMINOPHEN 1000 MG: 500 TABLET ORAL at 07:07

## 2021-07-06 RX ADMIN — PROPOFOL 50 MG: 10 INJECTION, EMULSION INTRAVENOUS at 08:07

## 2021-07-14 ENCOUNTER — PATIENT OUTREACH (OUTPATIENT)
Dept: ADMINISTRATIVE | Facility: OTHER | Age: 71
End: 2021-07-14

## 2021-07-14 ENCOUNTER — TELEPHONE (OUTPATIENT)
Dept: UROLOGY | Facility: CLINIC | Age: 71
End: 2021-07-14

## 2021-07-15 ENCOUNTER — OFFICE VISIT (OUTPATIENT)
Dept: UROLOGY | Facility: CLINIC | Age: 71
End: 2021-07-15
Payer: MEDICARE

## 2021-07-15 ENCOUNTER — TELEPHONE (OUTPATIENT)
Dept: UROLOGY | Facility: CLINIC | Age: 71
End: 2021-07-15

## 2021-07-15 ENCOUNTER — SPECIALTY PHARMACY (OUTPATIENT)
Dept: PHARMACY | Facility: CLINIC | Age: 71
End: 2021-07-15

## 2021-07-15 ENCOUNTER — OFFICE VISIT (OUTPATIENT)
Dept: HEPATOLOGY | Facility: CLINIC | Age: 71
End: 2021-07-15
Payer: MEDICARE

## 2021-07-15 VITALS
SYSTOLIC BLOOD PRESSURE: 152 MMHG | TEMPERATURE: 99 F | WEIGHT: 174.38 LBS | RESPIRATION RATE: 16 BRPM | OXYGEN SATURATION: 98 % | BODY MASS INDEX: 24.97 KG/M2 | HEIGHT: 70 IN | HEART RATE: 71 BPM | DIASTOLIC BLOOD PRESSURE: 74 MMHG

## 2021-07-15 DIAGNOSIS — Z98.890 POST-OPERATIVE STATE: Primary | ICD-10-CM

## 2021-07-15 DIAGNOSIS — B18.2 CHRONIC HEPATITIS C WITHOUT HEPATIC COMA: Primary | ICD-10-CM

## 2021-07-15 DIAGNOSIS — Z46.6 ENCOUNTER FOR FOLEY CATHETER REMOVAL: ICD-10-CM

## 2021-07-15 PROCEDURE — 1101F PR PT FALLS ASSESS DOC 0-1 FALLS W/OUT INJ PAST YR: ICD-10-PCS | Mod: CPTII,S$GLB,, | Performed by: PHYSICIAN ASSISTANT

## 2021-07-15 PROCEDURE — 3288F PR FALLS RISK ASSESSMENT DOCUMENTED: ICD-10-PCS | Mod: CPTII,S$GLB,, | Performed by: NURSE PRACTITIONER

## 2021-07-15 PROCEDURE — 99999 PR PBB SHADOW E&M-EST. PATIENT-LVL III: CPT | Mod: PBBFAC,,, | Performed by: NURSE PRACTITIONER

## 2021-07-15 PROCEDURE — 1126F PR PAIN SEVERITY QUANTIFIED, NO PAIN PRESENT: ICD-10-PCS | Mod: S$GLB,,, | Performed by: PHYSICIAN ASSISTANT

## 2021-07-15 PROCEDURE — 1126F AMNT PAIN NOTED NONE PRSNT: CPT | Mod: S$GLB,,, | Performed by: PHYSICIAN ASSISTANT

## 2021-07-15 PROCEDURE — 1101F PT FALLS ASSESS-DOCD LE1/YR: CPT | Mod: CPTII,S$GLB,, | Performed by: NURSE PRACTITIONER

## 2021-07-15 PROCEDURE — 3288F FALL RISK ASSESSMENT DOCD: CPT | Mod: CPTII,S$GLB,, | Performed by: NURSE PRACTITIONER

## 2021-07-15 PROCEDURE — 99024 PR POST-OP FOLLOW-UP VISIT: ICD-10-PCS | Mod: S$GLB,,, | Performed by: NURSE PRACTITIONER

## 2021-07-15 PROCEDURE — 1101F PR PT FALLS ASSESS DOC 0-1 FALLS W/OUT INJ PAST YR: ICD-10-PCS | Mod: CPTII,S$GLB,, | Performed by: NURSE PRACTITIONER

## 2021-07-15 PROCEDURE — 3288F PR FALLS RISK ASSESSMENT DOCUMENTED: ICD-10-PCS | Mod: CPTII,S$GLB,, | Performed by: PHYSICIAN ASSISTANT

## 2021-07-15 PROCEDURE — 99024 POSTOP FOLLOW-UP VISIT: CPT | Mod: S$GLB,,, | Performed by: NURSE PRACTITIONER

## 2021-07-15 PROCEDURE — 1159F PR MEDICATION LIST DOCUMENTED IN MEDICAL RECORD: ICD-10-PCS | Mod: S$GLB,,, | Performed by: PHYSICIAN ASSISTANT

## 2021-07-15 PROCEDURE — 3008F BODY MASS INDEX DOCD: CPT | Mod: CPTII,S$GLB,, | Performed by: PHYSICIAN ASSISTANT

## 2021-07-15 PROCEDURE — 1101F PT FALLS ASSESS-DOCD LE1/YR: CPT | Mod: CPTII,S$GLB,, | Performed by: PHYSICIAN ASSISTANT

## 2021-07-15 PROCEDURE — 99999 PR PBB SHADOW E&M-EST. PATIENT-LVL III: ICD-10-PCS | Mod: PBBFAC,,, | Performed by: NURSE PRACTITIONER

## 2021-07-15 PROCEDURE — 99204 OFFICE O/P NEW MOD 45 MIN: CPT | Mod: S$GLB,,, | Performed by: PHYSICIAN ASSISTANT

## 2021-07-15 PROCEDURE — 99204 PR OFFICE/OUTPT VISIT, NEW, LEVL IV, 45-59 MIN: ICD-10-PCS | Mod: S$GLB,,, | Performed by: PHYSICIAN ASSISTANT

## 2021-07-15 PROCEDURE — 99999 PR PBB SHADOW E&M-EST. PATIENT-LVL V: CPT | Mod: PBBFAC,,, | Performed by: PHYSICIAN ASSISTANT

## 2021-07-15 PROCEDURE — 99499 RISK ADDL DX/OHS AUDIT: ICD-10-PCS | Mod: S$GLB,,, | Performed by: PHYSICIAN ASSISTANT

## 2021-07-15 PROCEDURE — 99999 PR PBB SHADOW E&M-EST. PATIENT-LVL V: ICD-10-PCS | Mod: PBBFAC,,, | Performed by: PHYSICIAN ASSISTANT

## 2021-07-15 PROCEDURE — 99499 UNLISTED E&M SERVICE: CPT | Mod: S$GLB,,, | Performed by: PHYSICIAN ASSISTANT

## 2021-07-15 PROCEDURE — 3008F PR BODY MASS INDEX (BMI) DOCUMENTED: ICD-10-PCS | Mod: CPTII,S$GLB,, | Performed by: PHYSICIAN ASSISTANT

## 2021-07-15 PROCEDURE — 1159F MED LIST DOCD IN RCRD: CPT | Mod: S$GLB,,, | Performed by: PHYSICIAN ASSISTANT

## 2021-07-15 PROCEDURE — 3288F FALL RISK ASSESSMENT DOCD: CPT | Mod: CPTII,S$GLB,, | Performed by: PHYSICIAN ASSISTANT

## 2021-07-15 RX ORDER — VELPATASVIR AND SOFOSBUVIR 100; 400 MG/1; MG/1
1 TABLET, FILM COATED ORAL DAILY
Qty: 28 TABLET | Refills: 2 | Status: SHIPPED | OUTPATIENT
Start: 2021-07-15 | End: 2022-01-18 | Stop reason: ALTCHOICE

## 2021-07-16 ENCOUNTER — TELEPHONE (OUTPATIENT)
Dept: UROLOGY | Facility: CLINIC | Age: 71
End: 2021-07-16

## 2021-07-16 ENCOUNTER — PES CALL (OUTPATIENT)
Dept: ADMINISTRATIVE | Facility: CLINIC | Age: 71
End: 2021-07-16

## 2021-07-16 ENCOUNTER — PATIENT MESSAGE (OUTPATIENT)
Dept: UROLOGY | Facility: CLINIC | Age: 71
End: 2021-07-16

## 2021-07-16 DIAGNOSIS — C61 PROSTATE CANCER: ICD-10-CM

## 2021-07-16 DIAGNOSIS — Z98.890 POST-OPERATIVE STATE: Primary | ICD-10-CM

## 2021-07-17 ENCOUNTER — HOSPITAL ENCOUNTER (EMERGENCY)
Facility: HOSPITAL | Age: 71
Discharge: HOME | End: 2021-07-17
Attending: EMERGENCY MEDICINE
Payer: MEDICARE

## 2021-07-17 ENCOUNTER — NURSE TRIAGE (OUTPATIENT)
Dept: ADMINISTRATIVE | Facility: CLINIC | Age: 71
End: 2021-07-17

## 2021-07-17 VITALS
HEIGHT: 70 IN | RESPIRATION RATE: 17 BRPM | TEMPERATURE: 98 F | SYSTOLIC BLOOD PRESSURE: 149 MMHG | DIASTOLIC BLOOD PRESSURE: 63 MMHG | HEART RATE: 71 BPM | OXYGEN SATURATION: 98 % | BODY MASS INDEX: 24.91 KG/M2 | WEIGHT: 174 LBS

## 2021-07-17 DIAGNOSIS — R33.9 URINARY RETENTION: Primary | ICD-10-CM

## 2021-07-17 LAB
BILIRUB UR QL STRIP: NEGATIVE
CLARITY UR: CLEAR
COLOR UR: YELLOW
GLUCOSE UR QL STRIP: NEGATIVE
HGB UR QL STRIP: ABNORMAL
KETONES UR QL STRIP: NEGATIVE
LEUKOCYTE ESTERASE UR QL STRIP: NEGATIVE
MICROSCOPIC COMMENT: ABNORMAL
NITRITE UR QL STRIP: NEGATIVE
PH UR STRIP: 7 [PH] (ref 5–8)
PROT UR QL STRIP: ABNORMAL
RBC #/AREA URNS HPF: 41 /HPF (ref 0–4)
SP GR UR STRIP: 1.01 (ref 1–1.03)
URN SPEC COLLECT METH UR: ABNORMAL
UROBILINOGEN UR STRIP-ACNC: NEGATIVE EU/DL
WBC #/AREA URNS HPF: 3 /HPF (ref 0–5)

## 2021-07-17 PROCEDURE — 51798 US URINE CAPACITY MEASURE: CPT

## 2021-07-17 PROCEDURE — 81000 URINALYSIS NONAUTO W/SCOPE: CPT | Performed by: EMERGENCY MEDICINE

## 2021-07-17 PROCEDURE — 99283 EMERGENCY DEPT VISIT LOW MDM: CPT

## 2021-07-17 PROCEDURE — 51702 INSERT TEMP BLADDER CATH: CPT

## 2021-07-17 PROCEDURE — 25000003 PHARM REV CODE 250: Performed by: EMERGENCY MEDICINE

## 2021-07-17 RX ORDER — LIDOCAINE HYDROCHLORIDE 20 MG/ML
JELLY TOPICAL
Status: COMPLETED | OUTPATIENT
Start: 2021-07-17 | End: 2021-07-17

## 2021-07-17 RX ADMIN — LIDOCAINE HYDROCHLORIDE: 20 JELLY TOPICAL at 07:07

## 2021-07-19 ENCOUNTER — PATIENT MESSAGE (OUTPATIENT)
Dept: UROLOGY | Facility: CLINIC | Age: 71
End: 2021-07-19

## 2021-07-22 ENCOUNTER — HOSPITAL ENCOUNTER (OUTPATIENT)
Dept: RADIOLOGY | Facility: HOSPITAL | Age: 71
Discharge: HOME | End: 2021-07-22
Attending: PHYSICIAN ASSISTANT
Payer: MEDICARE

## 2021-07-22 DIAGNOSIS — B18.2 CHRONIC HEPATITIS C WITHOUT HEPATIC COMA: ICD-10-CM

## 2021-07-22 PROCEDURE — 76700 US EXAM ABDOM COMPLETE: CPT | Mod: TC

## 2021-07-22 PROCEDURE — 76700 US EXAM ABDOM COMPLETE: CPT | Mod: 26,,, | Performed by: RADIOLOGY

## 2021-07-22 PROCEDURE — 76700 US ABDOMEN COMPLETE: ICD-10-PCS | Mod: 26,,, | Performed by: RADIOLOGY

## 2021-07-23 ENCOUNTER — TELEPHONE (OUTPATIENT)
Dept: HEPATOLOGY | Facility: CLINIC | Age: 71
End: 2021-07-23

## 2021-07-26 ENCOUNTER — SPECIALTY PHARMACY (OUTPATIENT)
Dept: PHARMACY | Facility: CLINIC | Age: 71
End: 2021-07-26

## 2021-07-28 ENCOUNTER — TELEPHONE (OUTPATIENT)
Dept: HEPATOLOGY | Facility: CLINIC | Age: 71
End: 2021-07-28

## 2021-07-28 DIAGNOSIS — B18.2 CHRONIC HEPATITIS C WITHOUT HEPATIC COMA: Primary | ICD-10-CM

## 2021-07-29 ENCOUNTER — OFFICE VISIT (OUTPATIENT)
Dept: UROLOGY | Facility: CLINIC | Age: 71
End: 2021-07-29
Payer: MEDICARE

## 2021-07-29 ENCOUNTER — TELEPHONE (OUTPATIENT)
Dept: UROLOGY | Facility: CLINIC | Age: 71
End: 2021-07-29

## 2021-07-29 VITALS
DIASTOLIC BLOOD PRESSURE: 76 MMHG | HEART RATE: 73 BPM | BODY MASS INDEX: 24.74 KG/M2 | WEIGHT: 172.38 LBS | SYSTOLIC BLOOD PRESSURE: 154 MMHG

## 2021-07-29 DIAGNOSIS — C61 PROSTATE CANCER: ICD-10-CM

## 2021-07-29 DIAGNOSIS — Z46.6 ENCOUNTER FOR FOLEY CATHETER REMOVAL: ICD-10-CM

## 2021-07-29 DIAGNOSIS — Z98.890 POST-OPERATIVE STATE: ICD-10-CM

## 2021-07-29 DIAGNOSIS — R33.9 URINARY RETENTION: Primary | ICD-10-CM

## 2021-07-29 PROCEDURE — 1160F PR REVIEW ALL MEDS BY PRESCRIBER/CLIN PHARMACIST DOCUMENTED: ICD-10-PCS | Mod: CPTII,S$GLB,, | Performed by: NURSE PRACTITIONER

## 2021-07-29 PROCEDURE — 3288F FALL RISK ASSESSMENT DOCD: CPT | Mod: CPTII,S$GLB,, | Performed by: NURSE PRACTITIONER

## 2021-07-29 PROCEDURE — 1126F AMNT PAIN NOTED NONE PRSNT: CPT | Mod: CPTII,S$GLB,, | Performed by: NURSE PRACTITIONER

## 2021-07-29 PROCEDURE — 1101F PT FALLS ASSESS-DOCD LE1/YR: CPT | Mod: CPTII,S$GLB,, | Performed by: NURSE PRACTITIONER

## 2021-07-29 PROCEDURE — 3008F PR BODY MASS INDEX (BMI) DOCUMENTED: ICD-10-PCS | Mod: CPTII,S$GLB,, | Performed by: NURSE PRACTITIONER

## 2021-07-29 PROCEDURE — 99024 POSTOP FOLLOW-UP VISIT: CPT | Mod: S$GLB,,, | Performed by: NURSE PRACTITIONER

## 2021-07-29 PROCEDURE — 1160F RVW MEDS BY RX/DR IN RCRD: CPT | Mod: CPTII,S$GLB,, | Performed by: NURSE PRACTITIONER

## 2021-07-29 PROCEDURE — 99999 PR PBB SHADOW E&M-EST. PATIENT-LVL III: CPT | Mod: PBBFAC,,, | Performed by: NURSE PRACTITIONER

## 2021-07-29 PROCEDURE — 3077F PR MOST RECENT SYSTOLIC BLOOD PRESSURE >= 140 MM HG: ICD-10-PCS | Mod: CPTII,S$GLB,, | Performed by: NURSE PRACTITIONER

## 2021-07-29 PROCEDURE — 1159F MED LIST DOCD IN RCRD: CPT | Mod: CPTII,S$GLB,, | Performed by: NURSE PRACTITIONER

## 2021-07-29 PROCEDURE — 99999 PR PBB SHADOW E&M-EST. PATIENT-LVL III: ICD-10-PCS | Mod: PBBFAC,,, | Performed by: NURSE PRACTITIONER

## 2021-07-29 PROCEDURE — 1126F PR PAIN SEVERITY QUANTIFIED, NO PAIN PRESENT: ICD-10-PCS | Mod: CPTII,S$GLB,, | Performed by: NURSE PRACTITIONER

## 2021-07-29 PROCEDURE — 3077F SYST BP >= 140 MM HG: CPT | Mod: CPTII,S$GLB,, | Performed by: NURSE PRACTITIONER

## 2021-07-29 PROCEDURE — 3008F BODY MASS INDEX DOCD: CPT | Mod: CPTII,S$GLB,, | Performed by: NURSE PRACTITIONER

## 2021-07-29 PROCEDURE — 99024 PR POST-OP FOLLOW-UP VISIT: ICD-10-PCS | Mod: S$GLB,,, | Performed by: NURSE PRACTITIONER

## 2021-07-29 PROCEDURE — 3288F PR FALLS RISK ASSESSMENT DOCUMENTED: ICD-10-PCS | Mod: CPTII,S$GLB,, | Performed by: NURSE PRACTITIONER

## 2021-07-29 PROCEDURE — 1101F PR PT FALLS ASSESS DOC 0-1 FALLS W/OUT INJ PAST YR: ICD-10-PCS | Mod: CPTII,S$GLB,, | Performed by: NURSE PRACTITIONER

## 2021-07-29 PROCEDURE — 3078F PR MOST RECENT DIASTOLIC BLOOD PRESSURE < 80 MM HG: ICD-10-PCS | Mod: CPTII,S$GLB,, | Performed by: NURSE PRACTITIONER

## 2021-07-29 PROCEDURE — 1159F PR MEDICATION LIST DOCUMENTED IN MEDICAL RECORD: ICD-10-PCS | Mod: CPTII,S$GLB,, | Performed by: NURSE PRACTITIONER

## 2021-07-29 PROCEDURE — 3078F DIAST BP <80 MM HG: CPT | Mod: CPTII,S$GLB,, | Performed by: NURSE PRACTITIONER

## 2021-07-30 ENCOUNTER — TELEPHONE (OUTPATIENT)
Dept: UROLOGY | Facility: CLINIC | Age: 71
End: 2021-07-30

## 2021-08-02 ENCOUNTER — TELEPHONE (OUTPATIENT)
Dept: HEPATOLOGY | Facility: CLINIC | Age: 71
End: 2021-08-02

## 2021-08-02 ENCOUNTER — TELEPHONE (OUTPATIENT)
Dept: PHARMACY | Facility: CLINIC | Age: 71
End: 2021-08-02

## 2021-08-03 ENCOUNTER — OFFICE VISIT (OUTPATIENT)
Dept: URGENT CARE | Facility: CLINIC | Age: 71
End: 2021-08-03
Payer: MEDICARE

## 2021-08-03 ENCOUNTER — TELEPHONE (OUTPATIENT)
Dept: HEPATOLOGY | Facility: CLINIC | Age: 71
End: 2021-08-03

## 2021-08-03 VITALS
DIASTOLIC BLOOD PRESSURE: 81 MMHG | SYSTOLIC BLOOD PRESSURE: 181 MMHG | WEIGHT: 172 LBS | TEMPERATURE: 97 F | RESPIRATION RATE: 18 BRPM | HEIGHT: 70 IN | BODY MASS INDEX: 24.62 KG/M2 | HEART RATE: 80 BPM | OXYGEN SATURATION: 96 %

## 2021-08-03 DIAGNOSIS — N30.01 ACUTE CYSTITIS WITH HEMATURIA: Primary | ICD-10-CM

## 2021-08-03 DIAGNOSIS — K92.1 BLACK TARRY STOOLS: ICD-10-CM

## 2021-08-03 LAB
BILIRUB UR QL STRIP: NEGATIVE
CTP QC/QA: YES
GLUCOSE UR QL STRIP: NEGATIVE
KETONES UR QL STRIP: NEGATIVE
LEUKOCYTE ESTERASE UR QL STRIP: POSITIVE
PH, POC UA: 6 (ref 5–8)
POC BLOOD, URINE: POSITIVE
POC NITRATES, URINE: POSITIVE
PROT UR QL STRIP: POSITIVE
SARS-COV-2 RDRP RESP QL NAA+PROBE: NEGATIVE
SP GR UR STRIP: 1.02 (ref 1–1.03)
UROBILINOGEN UR STRIP-ACNC: NORMAL (ref 0.3–2.2)

## 2021-08-03 PROCEDURE — 3079F PR MOST RECENT DIASTOLIC BLOOD PRESSURE 80-89 MM HG: ICD-10-PCS | Mod: CPTII,S$GLB,, | Performed by: INTERNAL MEDICINE

## 2021-08-03 PROCEDURE — 3008F PR BODY MASS INDEX (BMI) DOCUMENTED: ICD-10-PCS | Mod: CPTII,S$GLB,, | Performed by: INTERNAL MEDICINE

## 2021-08-03 PROCEDURE — 81003 POCT URINALYSIS, DIPSTICK, AUTOMATED, W/O SCOPE: ICD-10-PCS | Mod: QW,S$GLB,, | Performed by: INTERNAL MEDICINE

## 2021-08-03 PROCEDURE — 3008F BODY MASS INDEX DOCD: CPT | Mod: CPTII,S$GLB,, | Performed by: INTERNAL MEDICINE

## 2021-08-03 PROCEDURE — 3077F PR MOST RECENT SYSTOLIC BLOOD PRESSURE >= 140 MM HG: ICD-10-PCS | Mod: CPTII,S$GLB,, | Performed by: INTERNAL MEDICINE

## 2021-08-03 PROCEDURE — 1159F PR MEDICATION LIST DOCUMENTED IN MEDICAL RECORD: ICD-10-PCS | Mod: CPTII,S$GLB,, | Performed by: INTERNAL MEDICINE

## 2021-08-03 PROCEDURE — 1159F MED LIST DOCD IN RCRD: CPT | Mod: CPTII,S$GLB,, | Performed by: INTERNAL MEDICINE

## 2021-08-03 PROCEDURE — 3079F DIAST BP 80-89 MM HG: CPT | Mod: CPTII,S$GLB,, | Performed by: INTERNAL MEDICINE

## 2021-08-03 PROCEDURE — U0002 COVID-19 LAB TEST NON-CDC: HCPCS | Mod: QW,S$GLB,, | Performed by: INTERNAL MEDICINE

## 2021-08-03 PROCEDURE — 1160F PR REVIEW ALL MEDS BY PRESCRIBER/CLIN PHARMACIST DOCUMENTED: ICD-10-PCS | Mod: CPTII,S$GLB,, | Performed by: INTERNAL MEDICINE

## 2021-08-03 PROCEDURE — 81003 URINALYSIS AUTO W/O SCOPE: CPT | Mod: QW,S$GLB,, | Performed by: INTERNAL MEDICINE

## 2021-08-03 PROCEDURE — U0002: ICD-10-PCS | Mod: QW,S$GLB,, | Performed by: INTERNAL MEDICINE

## 2021-08-03 PROCEDURE — 3077F SYST BP >= 140 MM HG: CPT | Mod: CPTII,S$GLB,, | Performed by: INTERNAL MEDICINE

## 2021-08-03 PROCEDURE — 1160F RVW MEDS BY RX/DR IN RCRD: CPT | Mod: CPTII,S$GLB,, | Performed by: INTERNAL MEDICINE

## 2021-08-03 PROCEDURE — 99214 OFFICE O/P EST MOD 30 MIN: CPT | Mod: 25,S$GLB,CS, | Performed by: INTERNAL MEDICINE

## 2021-08-03 PROCEDURE — 99214 PR OFFICE/OUTPT VISIT, EST, LEVL IV, 30-39 MIN: ICD-10-PCS | Mod: 25,S$GLB,CS, | Performed by: INTERNAL MEDICINE

## 2021-08-03 RX ORDER — CIPROFLOXACIN 500 MG/1
500 TABLET ORAL EVERY 12 HOURS
Qty: 28 TABLET | Refills: 0 | Status: SHIPPED | OUTPATIENT
Start: 2021-08-03 | End: 2021-08-17

## 2021-08-05 ENCOUNTER — SPECIALTY PHARMACY (OUTPATIENT)
Dept: PHARMACY | Facility: CLINIC | Age: 71
End: 2021-08-05

## 2021-08-05 ENCOUNTER — PES CALL (OUTPATIENT)
Dept: ADMINISTRATIVE | Facility: CLINIC | Age: 71
End: 2021-08-05

## 2021-08-05 ENCOUNTER — OFFICE VISIT (OUTPATIENT)
Dept: UROLOGY | Facility: CLINIC | Age: 71
End: 2021-08-05
Payer: MEDICARE

## 2021-08-05 VITALS
HEIGHT: 70 IN | WEIGHT: 169 LBS | RESPIRATION RATE: 20 BRPM | HEART RATE: 83 BPM | BODY MASS INDEX: 24.2 KG/M2 | DIASTOLIC BLOOD PRESSURE: 80 MMHG | SYSTOLIC BLOOD PRESSURE: 139 MMHG

## 2021-08-05 DIAGNOSIS — C61 PROSTATE CANCER: Primary | ICD-10-CM

## 2021-08-05 DIAGNOSIS — C61 MALIGNANT NEOPLASM OF PROSTATE: ICD-10-CM

## 2021-08-05 PROCEDURE — 3008F PR BODY MASS INDEX (BMI) DOCUMENTED: ICD-10-PCS | Mod: CPTII,S$GLB,, | Performed by: UROLOGY

## 2021-08-05 PROCEDURE — 1101F PR PT FALLS ASSESS DOC 0-1 FALLS W/OUT INJ PAST YR: ICD-10-PCS | Mod: CPTII,S$GLB,, | Performed by: UROLOGY

## 2021-08-05 PROCEDURE — 1126F PR PAIN SEVERITY QUANTIFIED, NO PAIN PRESENT: ICD-10-PCS | Mod: CPTII,S$GLB,, | Performed by: UROLOGY

## 2021-08-05 PROCEDURE — 3079F PR MOST RECENT DIASTOLIC BLOOD PRESSURE 80-89 MM HG: ICD-10-PCS | Mod: CPTII,S$GLB,, | Performed by: UROLOGY

## 2021-08-05 PROCEDURE — 3079F DIAST BP 80-89 MM HG: CPT | Mod: CPTII,S$GLB,, | Performed by: UROLOGY

## 2021-08-05 PROCEDURE — 1126F AMNT PAIN NOTED NONE PRSNT: CPT | Mod: CPTII,S$GLB,, | Performed by: UROLOGY

## 2021-08-05 PROCEDURE — 1159F MED LIST DOCD IN RCRD: CPT | Mod: CPTII,S$GLB,, | Performed by: UROLOGY

## 2021-08-05 PROCEDURE — 3288F FALL RISK ASSESSMENT DOCD: CPT | Mod: CPTII,S$GLB,, | Performed by: UROLOGY

## 2021-08-05 PROCEDURE — 1101F PT FALLS ASSESS-DOCD LE1/YR: CPT | Mod: CPTII,S$GLB,, | Performed by: UROLOGY

## 2021-08-05 PROCEDURE — 3075F SYST BP GE 130 - 139MM HG: CPT | Mod: CPTII,S$GLB,, | Performed by: UROLOGY

## 2021-08-05 PROCEDURE — 99999 PR PBB SHADOW E&M-EST. PATIENT-LVL III: CPT | Mod: PBBFAC,,, | Performed by: UROLOGY

## 2021-08-05 PROCEDURE — 1159F PR MEDICATION LIST DOCUMENTED IN MEDICAL RECORD: ICD-10-PCS | Mod: CPTII,S$GLB,, | Performed by: UROLOGY

## 2021-08-05 PROCEDURE — 3008F BODY MASS INDEX DOCD: CPT | Mod: CPTII,S$GLB,, | Performed by: UROLOGY

## 2021-08-05 PROCEDURE — 99499 NO LOS: ICD-10-PCS | Mod: S$GLB,,, | Performed by: UROLOGY

## 2021-08-05 PROCEDURE — 99999 PR PBB SHADOW E&M-EST. PATIENT-LVL III: ICD-10-PCS | Mod: PBBFAC,,, | Performed by: UROLOGY

## 2021-08-05 PROCEDURE — 3288F PR FALLS RISK ASSESSMENT DOCUMENTED: ICD-10-PCS | Mod: CPTII,S$GLB,, | Performed by: UROLOGY

## 2021-08-05 PROCEDURE — 3075F PR MOST RECENT SYSTOLIC BLOOD PRESS GE 130-139MM HG: ICD-10-PCS | Mod: CPTII,S$GLB,, | Performed by: UROLOGY

## 2021-08-05 PROCEDURE — 99499 UNLISTED E&M SERVICE: CPT | Mod: S$GLB,,, | Performed by: UROLOGY

## 2021-08-09 ENCOUNTER — OFFICE VISIT (OUTPATIENT)
Dept: FAMILY MEDICINE | Facility: CLINIC | Age: 71
End: 2021-08-09
Payer: MEDICARE

## 2021-08-09 VITALS
TEMPERATURE: 98 F | OXYGEN SATURATION: 99 % | RESPIRATION RATE: 18 BRPM | DIASTOLIC BLOOD PRESSURE: 80 MMHG | SYSTOLIC BLOOD PRESSURE: 138 MMHG | BODY MASS INDEX: 24.59 KG/M2 | HEIGHT: 70 IN | WEIGHT: 171.75 LBS | HEART RATE: 88 BPM

## 2021-08-09 DIAGNOSIS — B18.2 HEP C W/O COMA, CHRONIC: ICD-10-CM

## 2021-08-09 DIAGNOSIS — N40.1 BPH WITH OBSTRUCTION/LOWER URINARY TRACT SYMPTOMS: ICD-10-CM

## 2021-08-09 DIAGNOSIS — C61 PROSTATE CANCER: ICD-10-CM

## 2021-08-09 DIAGNOSIS — I10 ESSENTIAL HYPERTENSION: Primary | ICD-10-CM

## 2021-08-09 DIAGNOSIS — N13.8 BPH WITH OBSTRUCTION/LOWER URINARY TRACT SYMPTOMS: ICD-10-CM

## 2021-08-09 PROCEDURE — 1101F PT FALLS ASSESS-DOCD LE1/YR: CPT | Mod: CPTII,S$GLB,, | Performed by: FAMILY MEDICINE

## 2021-08-09 PROCEDURE — 3079F DIAST BP 80-89 MM HG: CPT | Mod: CPTII,S$GLB,, | Performed by: FAMILY MEDICINE

## 2021-08-09 PROCEDURE — 99999 PR PBB SHADOW E&M-EST. PATIENT-LVL IV: ICD-10-PCS | Mod: PBBFAC,,, | Performed by: FAMILY MEDICINE

## 2021-08-09 PROCEDURE — 3075F PR MOST RECENT SYSTOLIC BLOOD PRESS GE 130-139MM HG: ICD-10-PCS | Mod: CPTII,S$GLB,, | Performed by: FAMILY MEDICINE

## 2021-08-09 PROCEDURE — 3079F PR MOST RECENT DIASTOLIC BLOOD PRESSURE 80-89 MM HG: ICD-10-PCS | Mod: CPTII,S$GLB,, | Performed by: FAMILY MEDICINE

## 2021-08-09 PROCEDURE — 1101F PR PT FALLS ASSESS DOC 0-1 FALLS W/OUT INJ PAST YR: ICD-10-PCS | Mod: CPTII,S$GLB,, | Performed by: FAMILY MEDICINE

## 2021-08-09 PROCEDURE — 1160F RVW MEDS BY RX/DR IN RCRD: CPT | Mod: CPTII,S$GLB,, | Performed by: FAMILY MEDICINE

## 2021-08-09 PROCEDURE — 3075F SYST BP GE 130 - 139MM HG: CPT | Mod: CPTII,S$GLB,, | Performed by: FAMILY MEDICINE

## 2021-08-09 PROCEDURE — 3008F BODY MASS INDEX DOCD: CPT | Mod: CPTII,S$GLB,, | Performed by: FAMILY MEDICINE

## 2021-08-09 PROCEDURE — 99214 OFFICE O/P EST MOD 30 MIN: CPT | Mod: S$GLB,,, | Performed by: FAMILY MEDICINE

## 2021-08-09 PROCEDURE — 3008F PR BODY MASS INDEX (BMI) DOCUMENTED: ICD-10-PCS | Mod: CPTII,S$GLB,, | Performed by: FAMILY MEDICINE

## 2021-08-09 PROCEDURE — 1159F PR MEDICATION LIST DOCUMENTED IN MEDICAL RECORD: ICD-10-PCS | Mod: CPTII,S$GLB,, | Performed by: FAMILY MEDICINE

## 2021-08-09 PROCEDURE — 99214 PR OFFICE/OUTPT VISIT, EST, LEVL IV, 30-39 MIN: ICD-10-PCS | Mod: S$GLB,,, | Performed by: FAMILY MEDICINE

## 2021-08-09 PROCEDURE — 99999 PR PBB SHADOW E&M-EST. PATIENT-LVL IV: CPT | Mod: PBBFAC,,, | Performed by: FAMILY MEDICINE

## 2021-08-09 PROCEDURE — 1159F MED LIST DOCD IN RCRD: CPT | Mod: CPTII,S$GLB,, | Performed by: FAMILY MEDICINE

## 2021-08-09 PROCEDURE — 3288F PR FALLS RISK ASSESSMENT DOCUMENTED: ICD-10-PCS | Mod: CPTII,S$GLB,, | Performed by: FAMILY MEDICINE

## 2021-08-09 PROCEDURE — 1160F PR REVIEW ALL MEDS BY PRESCRIBER/CLIN PHARMACIST DOCUMENTED: ICD-10-PCS | Mod: CPTII,S$GLB,, | Performed by: FAMILY MEDICINE

## 2021-08-09 PROCEDURE — 3288F FALL RISK ASSESSMENT DOCD: CPT | Mod: CPTII,S$GLB,, | Performed by: FAMILY MEDICINE

## 2021-08-17 ENCOUNTER — LAB VISIT (OUTPATIENT)
Dept: LAB | Facility: HOSPITAL | Age: 71
End: 2021-08-17
Attending: UROLOGY
Payer: MEDICARE

## 2021-08-17 DIAGNOSIS — Z98.890 POST-OPERATIVE STATE: ICD-10-CM

## 2021-08-17 DIAGNOSIS — C61 PROSTATE CANCER: ICD-10-CM

## 2021-08-17 LAB — COMPLEXED PSA SERPL-MCNC: 3.6 NG/ML (ref 0–4)

## 2021-08-17 PROCEDURE — 36415 COLL VENOUS BLD VENIPUNCTURE: CPT | Mod: PO | Performed by: UROLOGY

## 2021-08-17 PROCEDURE — 84153 ASSAY OF PSA TOTAL: CPT | Performed by: UROLOGY

## 2021-08-19 ENCOUNTER — OFFICE VISIT (OUTPATIENT)
Dept: UROLOGY | Facility: CLINIC | Age: 71
End: 2021-08-19
Payer: MEDICARE

## 2021-08-19 ENCOUNTER — SPECIALTY PHARMACY (OUTPATIENT)
Dept: PHARMACY | Facility: CLINIC | Age: 71
End: 2021-08-19

## 2021-08-19 VITALS
SYSTOLIC BLOOD PRESSURE: 145 MMHG | DIASTOLIC BLOOD PRESSURE: 83 MMHG | WEIGHT: 169.75 LBS | BODY MASS INDEX: 24.3 KG/M2 | HEIGHT: 70 IN | HEART RATE: 76 BPM

## 2021-08-19 DIAGNOSIS — C61 PROSTATE CANCER: Primary | ICD-10-CM

## 2021-08-19 PROCEDURE — 3288F PR FALLS RISK ASSESSMENT DOCUMENTED: ICD-10-PCS | Mod: CPTII,S$GLB,, | Performed by: UROLOGY

## 2021-08-19 PROCEDURE — 1159F PR MEDICATION LIST DOCUMENTED IN MEDICAL RECORD: ICD-10-PCS | Mod: CPTII,S$GLB,, | Performed by: UROLOGY

## 2021-08-19 PROCEDURE — 1126F AMNT PAIN NOTED NONE PRSNT: CPT | Mod: CPTII,S$GLB,, | Performed by: UROLOGY

## 2021-08-19 PROCEDURE — 3288F FALL RISK ASSESSMENT DOCD: CPT | Mod: CPTII,S$GLB,, | Performed by: UROLOGY

## 2021-08-19 PROCEDURE — 1160F PR REVIEW ALL MEDS BY PRESCRIBER/CLIN PHARMACIST DOCUMENTED: ICD-10-PCS | Mod: CPTII,S$GLB,, | Performed by: UROLOGY

## 2021-08-19 PROCEDURE — 99499 NO LOS: ICD-10-PCS | Mod: S$GLB,,, | Performed by: UROLOGY

## 2021-08-19 PROCEDURE — 1159F MED LIST DOCD IN RCRD: CPT | Mod: CPTII,S$GLB,, | Performed by: UROLOGY

## 2021-08-19 PROCEDURE — 3077F PR MOST RECENT SYSTOLIC BLOOD PRESSURE >= 140 MM HG: ICD-10-PCS | Mod: CPTII,S$GLB,, | Performed by: UROLOGY

## 2021-08-19 PROCEDURE — 1126F PR PAIN SEVERITY QUANTIFIED, NO PAIN PRESENT: ICD-10-PCS | Mod: CPTII,S$GLB,, | Performed by: UROLOGY

## 2021-08-19 PROCEDURE — 1101F PR PT FALLS ASSESS DOC 0-1 FALLS W/OUT INJ PAST YR: ICD-10-PCS | Mod: CPTII,S$GLB,, | Performed by: UROLOGY

## 2021-08-19 PROCEDURE — 99999 PR PBB SHADOW E&M-EST. PATIENT-LVL III: CPT | Mod: PBBFAC,,, | Performed by: UROLOGY

## 2021-08-19 PROCEDURE — 99999 PR PBB SHADOW E&M-EST. PATIENT-LVL III: ICD-10-PCS | Mod: PBBFAC,,, | Performed by: UROLOGY

## 2021-08-19 PROCEDURE — 3008F PR BODY MASS INDEX (BMI) DOCUMENTED: ICD-10-PCS | Mod: CPTII,S$GLB,, | Performed by: UROLOGY

## 2021-08-19 PROCEDURE — 3079F DIAST BP 80-89 MM HG: CPT | Mod: CPTII,S$GLB,, | Performed by: UROLOGY

## 2021-08-19 PROCEDURE — 3079F PR MOST RECENT DIASTOLIC BLOOD PRESSURE 80-89 MM HG: ICD-10-PCS | Mod: CPTII,S$GLB,, | Performed by: UROLOGY

## 2021-08-19 PROCEDURE — 3008F BODY MASS INDEX DOCD: CPT | Mod: CPTII,S$GLB,, | Performed by: UROLOGY

## 2021-08-19 PROCEDURE — 99499 UNLISTED E&M SERVICE: CPT | Mod: S$GLB,,, | Performed by: UROLOGY

## 2021-08-19 PROCEDURE — 1160F RVW MEDS BY RX/DR IN RCRD: CPT | Mod: CPTII,S$GLB,, | Performed by: UROLOGY

## 2021-08-19 PROCEDURE — 1101F PT FALLS ASSESS-DOCD LE1/YR: CPT | Mod: CPTII,S$GLB,, | Performed by: UROLOGY

## 2021-08-19 PROCEDURE — 3077F SYST BP >= 140 MM HG: CPT | Mod: CPTII,S$GLB,, | Performed by: UROLOGY

## 2021-09-16 ENCOUNTER — OFFICE VISIT (OUTPATIENT)
Dept: OPTOMETRY | Facility: CLINIC | Age: 71
End: 2021-09-16
Payer: MEDICARE

## 2021-09-16 ENCOUNTER — SPECIALTY PHARMACY (OUTPATIENT)
Dept: PHARMACY | Facility: CLINIC | Age: 71
End: 2021-09-16

## 2021-09-16 DIAGNOSIS — H25.13 NUCLEAR SCLEROSIS, BILATERAL: ICD-10-CM

## 2021-09-16 DIAGNOSIS — H25.013 CORTICAL AGE-RELATED CATARACT OF BOTH EYES: ICD-10-CM

## 2021-09-16 DIAGNOSIS — H04.123 DRY EYE SYNDROME, BILATERAL: Primary | ICD-10-CM

## 2021-09-16 PROCEDURE — 1159F MED LIST DOCD IN RCRD: CPT | Mod: HCNC,CPTII,S$GLB, | Performed by: OPTOMETRIST

## 2021-09-16 PROCEDURE — 1159F PR MEDICATION LIST DOCUMENTED IN MEDICAL RECORD: ICD-10-PCS | Mod: HCNC,CPTII,S$GLB, | Performed by: OPTOMETRIST

## 2021-09-16 PROCEDURE — 99999 PR PBB SHADOW E&M-EST. PATIENT-LVL II: ICD-10-PCS | Mod: PBBFAC,HCNC,, | Performed by: OPTOMETRIST

## 2021-09-16 PROCEDURE — 1101F PR PT FALLS ASSESS DOC 0-1 FALLS W/OUT INJ PAST YR: ICD-10-PCS | Mod: HCNC,CPTII,S$GLB, | Performed by: OPTOMETRIST

## 2021-09-16 PROCEDURE — 3288F PR FALLS RISK ASSESSMENT DOCUMENTED: ICD-10-PCS | Mod: HCNC,CPTII,S$GLB, | Performed by: OPTOMETRIST

## 2021-09-16 PROCEDURE — 1160F RVW MEDS BY RX/DR IN RCRD: CPT | Mod: HCNC,CPTII,S$GLB, | Performed by: OPTOMETRIST

## 2021-09-16 PROCEDURE — 1101F PT FALLS ASSESS-DOCD LE1/YR: CPT | Mod: HCNC,CPTII,S$GLB, | Performed by: OPTOMETRIST

## 2021-09-16 PROCEDURE — 1160F PR REVIEW ALL MEDS BY PRESCRIBER/CLIN PHARMACIST DOCUMENTED: ICD-10-PCS | Mod: HCNC,CPTII,S$GLB, | Performed by: OPTOMETRIST

## 2021-09-16 PROCEDURE — 92014 PR EYE EXAM, EST PATIENT,COMPREHESV: ICD-10-PCS | Mod: HCNC,S$GLB,, | Performed by: OPTOMETRIST

## 2021-09-16 PROCEDURE — 99999 PR PBB SHADOW E&M-EST. PATIENT-LVL II: CPT | Mod: PBBFAC,HCNC,, | Performed by: OPTOMETRIST

## 2021-09-16 PROCEDURE — 92014 COMPRE OPH EXAM EST PT 1/>: CPT | Mod: HCNC,S$GLB,, | Performed by: OPTOMETRIST

## 2021-09-16 PROCEDURE — 3288F FALL RISK ASSESSMENT DOCD: CPT | Mod: HCNC,CPTII,S$GLB, | Performed by: OPTOMETRIST

## 2021-09-29 ENCOUNTER — PATIENT MESSAGE (OUTPATIENT)
Dept: HEMATOLOGY/ONCOLOGY | Facility: CLINIC | Age: 71
End: 2021-09-29

## 2021-09-29 ENCOUNTER — OFFICE VISIT (OUTPATIENT)
Dept: HEMATOLOGY/ONCOLOGY | Facility: CLINIC | Age: 71
End: 2021-09-29
Payer: MEDICARE

## 2021-09-29 DIAGNOSIS — Z71.83 ENCOUNTER FOR NONPROCREATIVE GENETIC COUNSELING: Primary | ICD-10-CM

## 2021-09-29 DIAGNOSIS — C61 PROSTATE CANCER: ICD-10-CM

## 2021-09-29 PROCEDURE — 1125F PR PAIN SEVERITY QUANTIFIED, PAIN PRESENT: ICD-10-PCS | Mod: HCNC,CPTII,S$GLB, | Performed by: NURSE PRACTITIONER

## 2021-09-29 PROCEDURE — 1125F AMNT PAIN NOTED PAIN PRSNT: CPT | Mod: HCNC,CPTII,S$GLB, | Performed by: NURSE PRACTITIONER

## 2021-09-29 PROCEDURE — 3288F FALL RISK ASSESSMENT DOCD: CPT | Mod: HCNC,CPTII,S$GLB, | Performed by: NURSE PRACTITIONER

## 2021-09-29 PROCEDURE — 1101F PT FALLS ASSESS-DOCD LE1/YR: CPT | Mod: HCNC,CPTII,S$GLB, | Performed by: NURSE PRACTITIONER

## 2021-09-29 PROCEDURE — 3288F PR FALLS RISK ASSESSMENT DOCUMENTED: ICD-10-PCS | Mod: HCNC,CPTII,S$GLB, | Performed by: NURSE PRACTITIONER

## 2021-09-29 PROCEDURE — 99999 PR PBB SHADOW E&M-EST. PATIENT-LVL II: ICD-10-PCS | Mod: PBBFAC,HCNC,, | Performed by: NURSE PRACTITIONER

## 2021-09-29 PROCEDURE — 1101F PR PT FALLS ASSESS DOC 0-1 FALLS W/OUT INJ PAST YR: ICD-10-PCS | Mod: HCNC,CPTII,S$GLB, | Performed by: NURSE PRACTITIONER

## 2021-09-29 PROCEDURE — 99204 PR OFFICE/OUTPT VISIT, NEW, LEVL IV, 45-59 MIN: ICD-10-PCS | Mod: HCNC,S$GLB,, | Performed by: NURSE PRACTITIONER

## 2021-09-29 PROCEDURE — 99204 OFFICE O/P NEW MOD 45 MIN: CPT | Mod: HCNC,S$GLB,, | Performed by: NURSE PRACTITIONER

## 2021-09-29 PROCEDURE — 99999 PR PBB SHADOW E&M-EST. PATIENT-LVL II: CPT | Mod: PBBFAC,HCNC,, | Performed by: NURSE PRACTITIONER

## 2022-01-12 ENCOUNTER — LAB VISIT (OUTPATIENT)
Dept: LAB | Facility: HOSPITAL | Age: 72
End: 2022-01-12
Payer: MEDICARE

## 2022-01-12 DIAGNOSIS — B18.2 CHRONIC HEPATITIS C WITHOUT HEPATIC COMA: ICD-10-CM

## 2022-01-12 LAB
ALBUMIN SERPL BCP-MCNC: 4.2 G/DL (ref 3.5–5.2)
ALP SERPL-CCNC: 61 U/L (ref 55–135)
ALT SERPL W/O P-5'-P-CCNC: 16 U/L (ref 10–44)
AST SERPL-CCNC: 26 U/L (ref 10–40)
BILIRUB DIRECT SERPL-MCNC: 0.5 MG/DL (ref 0.1–0.3)
BILIRUB SERPL-MCNC: 1.3 MG/DL (ref 0.1–1)
PROT SERPL-MCNC: 7.3 G/DL (ref 6–8.4)

## 2022-01-12 PROCEDURE — 36415 COLL VENOUS BLD VENIPUNCTURE: CPT | Mod: HCNC,PO | Performed by: PHYSICIAN ASSISTANT

## 2022-01-12 PROCEDURE — 87522 HEPATITIS C REVRS TRNSCRPJ: CPT | Mod: HCNC | Performed by: PHYSICIAN ASSISTANT

## 2022-01-12 PROCEDURE — 80076 HEPATIC FUNCTION PANEL: CPT | Mod: HCNC | Performed by: PHYSICIAN ASSISTANT

## 2022-01-14 LAB — HEPATITIS C VIRUS (HCV) RNA DETECTION/QUANTIFICATION RT-PCR: NORMAL IU/ML

## 2022-01-18 ENCOUNTER — TELEPHONE (OUTPATIENT)
Dept: HEPATOLOGY | Facility: CLINIC | Age: 72
End: 2022-01-18
Payer: MEDICARE

## 2022-01-18 DIAGNOSIS — Z86.19 HEPATITIS C VIRUS INFECTION CURED AFTER ANTIVIRAL DRUG THERAPY: ICD-10-CM

## 2022-01-18 NOTE — TELEPHONE ENCOUNTER
Pt began 12 weeks of Epclusa on 7/29/21  Anticipated treatment end date: 10/20/21  Aliza 1a  Treatment naive  F2      These labs document SVR12 following successful HCV treatment with Epclusa  This is consistent with a cure. Relapse is not expected.   No immunity is conferred and patient could be reinfected.       Pt has been notified by [facility]    Please schedule   - HCV RNA in 6 months

## 2022-02-22 ENCOUNTER — HOSPITAL ENCOUNTER (OUTPATIENT)
Dept: RADIOLOGY | Facility: HOSPITAL | Age: 72
Discharge: HOME | End: 2022-02-22
Attending: STUDENT IN AN ORGANIZED HEALTH CARE EDUCATION/TRAINING PROGRAM
Payer: MEDICARE

## 2022-02-22 DIAGNOSIS — C61 MALIGNANT NEOPLASM OF PROSTATE: ICD-10-CM

## 2022-02-22 PROCEDURE — 25500020 PHARM REV CODE 255: Mod: HCNC | Performed by: STUDENT IN AN ORGANIZED HEALTH CARE EDUCATION/TRAINING PROGRAM

## 2022-02-22 PROCEDURE — 72197 MRI PELVIS W/O & W/DYE: CPT | Mod: TC,HCNC

## 2022-02-22 PROCEDURE — 72197 MRI PELVIS W/O & W/DYE: CPT | Mod: 26,HCNC,, | Performed by: RADIOLOGY

## 2022-02-22 PROCEDURE — A9585 GADOBUTROL INJECTION: HCPCS | Mod: HCNC | Performed by: STUDENT IN AN ORGANIZED HEALTH CARE EDUCATION/TRAINING PROGRAM

## 2022-02-22 PROCEDURE — 72197 MRI PROSTATE W W/O CONTRAST: ICD-10-PCS | Mod: 26,HCNC,, | Performed by: RADIOLOGY

## 2022-02-22 RX ORDER — GADOBUTROL 604.72 MG/ML
10 INJECTION INTRAVENOUS
Status: COMPLETED | OUTPATIENT
Start: 2022-02-22 | End: 2022-02-22

## 2022-02-22 RX ADMIN — GADOBUTROL 10 ML: 604.72 INJECTION INTRAVENOUS at 09:02

## 2022-02-24 ENCOUNTER — OFFICE VISIT (OUTPATIENT)
Dept: UROLOGY | Facility: CLINIC | Age: 72
End: 2022-02-24
Payer: MEDICARE

## 2022-02-24 VITALS
SYSTOLIC BLOOD PRESSURE: 160 MMHG | BODY MASS INDEX: 26.2 KG/M2 | WEIGHT: 183 LBS | HEART RATE: 66 BPM | HEIGHT: 70 IN | DIASTOLIC BLOOD PRESSURE: 87 MMHG

## 2022-02-24 DIAGNOSIS — C61 PROSTATE CANCER: Primary | ICD-10-CM

## 2022-02-24 PROCEDURE — 3079F PR MOST RECENT DIASTOLIC BLOOD PRESSURE 80-89 MM HG: ICD-10-PCS | Mod: HCNC,CPTII,S$GLB, | Performed by: UROLOGY

## 2022-02-24 PROCEDURE — 1159F PR MEDICATION LIST DOCUMENTED IN MEDICAL RECORD: ICD-10-PCS | Mod: HCNC,CPTII,S$GLB, | Performed by: UROLOGY

## 2022-02-24 PROCEDURE — 3008F PR BODY MASS INDEX (BMI) DOCUMENTED: ICD-10-PCS | Mod: HCNC,CPTII,S$GLB, | Performed by: UROLOGY

## 2022-02-24 PROCEDURE — 3079F DIAST BP 80-89 MM HG: CPT | Mod: HCNC,CPTII,S$GLB, | Performed by: UROLOGY

## 2022-02-24 PROCEDURE — 3288F FALL RISK ASSESSMENT DOCD: CPT | Mod: HCNC,CPTII,S$GLB, | Performed by: UROLOGY

## 2022-02-24 PROCEDURE — 99213 OFFICE O/P EST LOW 20 MIN: CPT | Mod: HCNC,S$GLB,, | Performed by: UROLOGY

## 2022-02-24 PROCEDURE — 3008F BODY MASS INDEX DOCD: CPT | Mod: HCNC,CPTII,S$GLB, | Performed by: UROLOGY

## 2022-02-24 PROCEDURE — 3288F PR FALLS RISK ASSESSMENT DOCUMENTED: ICD-10-PCS | Mod: HCNC,CPTII,S$GLB, | Performed by: UROLOGY

## 2022-02-24 PROCEDURE — 1126F AMNT PAIN NOTED NONE PRSNT: CPT | Mod: HCNC,CPTII,S$GLB, | Performed by: UROLOGY

## 2022-02-24 PROCEDURE — 1101F PR PT FALLS ASSESS DOC 0-1 FALLS W/OUT INJ PAST YR: ICD-10-PCS | Mod: HCNC,CPTII,S$GLB, | Performed by: UROLOGY

## 2022-02-24 PROCEDURE — 1101F PT FALLS ASSESS-DOCD LE1/YR: CPT | Mod: HCNC,CPTII,S$GLB, | Performed by: UROLOGY

## 2022-02-24 PROCEDURE — 99999 PR PBB SHADOW E&M-EST. PATIENT-LVL III: CPT | Mod: PBBFAC,HCNC,, | Performed by: UROLOGY

## 2022-02-24 PROCEDURE — 3077F PR MOST RECENT SYSTOLIC BLOOD PRESSURE >= 140 MM HG: ICD-10-PCS | Mod: HCNC,CPTII,S$GLB, | Performed by: UROLOGY

## 2022-02-24 PROCEDURE — 1159F MED LIST DOCD IN RCRD: CPT | Mod: HCNC,CPTII,S$GLB, | Performed by: UROLOGY

## 2022-02-24 PROCEDURE — 3077F SYST BP >= 140 MM HG: CPT | Mod: HCNC,CPTII,S$GLB, | Performed by: UROLOGY

## 2022-02-24 PROCEDURE — 99213 PR OFFICE/OUTPT VISIT, EST, LEVL III, 20-29 MIN: ICD-10-PCS | Mod: HCNC,S$GLB,, | Performed by: UROLOGY

## 2022-02-24 PROCEDURE — 99999 PR PBB SHADOW E&M-EST. PATIENT-LVL III: ICD-10-PCS | Mod: PBBFAC,HCNC,, | Performed by: UROLOGY

## 2022-02-24 PROCEDURE — 1126F PR PAIN SEVERITY QUANTIFIED, NO PAIN PRESENT: ICD-10-PCS | Mod: HCNC,CPTII,S$GLB, | Performed by: UROLOGY

## 2022-02-24 NOTE — PROGRESS NOTES
Clinic Note  2/24/2022      Subjective:         Chief Complaint:   HPI  Sha Triplett is a 71 y.o. male with prostate cancer.  [place] vet, retired from [employer]  HIFU (high intensity focused ultrasound) 7/6/2021- right theodora-ablation. PSA has declined 9.3 to 3.6.      MRI 2/22/2022- 21 ccs, HIFU (high intensity focused ultrasound) post treatment effect, 0.7 cm RAantPZ PI-RADS 3.    Doing well s/p HIFU. Had some irritative voiding symptoms and foul smelling around 8/3, treated empirically with cipro.     FH - paternal cousin, uncle, paternal grandfather  PSA - 9.3  Stage - T1C  Volume - 38 ccs  MRI - 1/24/2020- L1- right apex 0.8 cm PI-RADS 4 lesion, L2 right apex/M 1.2 cm PI-RADS 3 lesion. Negative extra prostatic extension, neurovascular bundle involvement, SVI.  Biopsy - 11/26/2018 [place] 6 right apex 1/1 5%. 1/12 cores positive.  11/25/2019- [place] 4+3 RML 1/1 5%, right middle 1/1 1%, RAL 1/1 2%, right apex 1/1 2%. Overall 4/12 cores positive.  1/4/2021 [place] 3+4 RAL 1/1 20%, right apex 1/1 35%; [place] 6 RBL 1/1 10%, right base 1%, RML 10 %, right mid  20 %. Overall 6/12+.  CAMILLE Score - 6 high risk  NCCN - unfavorable intermediate  Nodes positive risk MSKCC-13%  Germline testing -discussed, interested  Somatic testing -discussed         Lab Results   Component Value Date    PSADIAG 3.3 02/22/2022    PSADIAG 3.6 08/17/2021    PSADIAG 9.3 (H) 05/04/2021    PSADIAG 6.8 (H) 11/10/2020    PSADIAG 5.7 (H) 08/19/2020    PSADIAG 6.7 (H) 06/05/2020    PSADIAG 6.3 (H) 09/04/2019    PSADIAG 4.6 (H) 06/12/2019    PSADIAG 5.4 (H) 03/07/2019    PSADIAG 4.4 (H) 10/02/2018      Past Medical History:   Diagnosis Date    Hepatitis C virus infection cured after antiviral drug therapy     s/p Rx (treated / cured ) - SVR12 - 1/2022 (g1a, F2-3)    History of posttraumatic stress disorder (PTSD)     [facility]  - 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    Other Maternal Grandfather         "diseases in his body"    Other Paternal Grandmother         hx?    Prostate cancer Paternal Grandfather         POSSIBLE (heard "rumors")    Other Paternal Uncle         possibly 1-2 with prostate CA    Prostate cancer Paternal Cousin 62        s/p radioactive pellets    Amblyopia Neg Hx     Diabetes Neg Hx     Glaucoma Neg Hx     Hypertension Neg Hx     Retinal detachment Neg Hx     Strabismus Neg Hx     Stroke Neg Hx     Thyroid disease Neg Hx     Colon polyps Neg Hx      Social History     Socioeconomic History    Marital status:    Tobacco Use    Smoking status: Former Smoker     Packs/day: 0.00    Smokeless tobacco: Never Used   Substance and Sexual Activity    Alcohol use: No    Drug use: Not Currently     Comment: past use of pot and IV drug use after [place]    Sexual activity: Not Currently   Social History Narrative     x 2.  Two bio kids. One adopted.   Retired [employer].  Now a non smoker.  [employer] .       Past Surgical History:   Procedure Laterality Date    ABLATION, PROSTATE, HIGH INTENSITY FOCUSED ULTRASOUND (HIFU)  2021    Procedure: ABLATION,PROSTATE,HIGH INTENSITY FOCUSED ULTRASOUND (HIFU);  Surgeon: David Rhodes MD;  Location: [facility] OR [address] [place] [address];  Service: Urology;;    COLONOSCOPY W/ BIOPSIES  3/4/15    / Dr. Ellis [facility].    LIVER BIOPSY      trus/bx       trus/bx        Patient Active Problem List   Diagnosis    Hypertension - diet controlled    Nocturia    Prostate cancer - s/p Prostate Bx 19 - s/p HIFU 21    Decreased ROM of " "intervertebral discs of thoracic spine    Thoracic spine pain    Overweight (BMI 25.0-29.9)    Other male erectile dysfunction - controlled on PRN Viagra    Refractive error    Nuclear sclerosis, bilateral    Cortical age-related cataract of both eyes    Hepatitis C virus infection cured after antiviral drug therapy     Review of Systems   Constitutional: Negative for appetite change, chills, fatigue, fever and unexpected weight change.   HENT: Negative for nosebleeds.    Respiratory: Negative for shortness of breath and wheezing.    Cardiovascular: Negative for chest pain, palpitations and leg swelling.   Gastrointestinal: Negative for abdominal distention, abdominal pain, constipation, diarrhea, nausea and vomiting.   Genitourinary: Negative for dysuria and hematuria.   Musculoskeletal: Negative for arthralgias and back pain.   Skin: Negative for pallor.   Neurological: Negative for dizziness, seizures and syncope.   Hematological: Negative for adenopathy.   Psychiatric/Behavioral: Negative for dysphoric mood.         Objective:      There were no vitals taken for this visit.  Estimated body mass index is 24.36 kg/m² as calculated from the following:    Height as of 8/19/21: 5' 10" (1.778 m).    Weight as of 8/19/21: 77 kg (169 lb 12.1 oz).  Physical Exam  Vitals and nursing note reviewed.   Constitutional:       General: He is not in acute distress.     Appearance: He is well-developed.   HENT:      Head: Normocephalic and atraumatic.   Eyes:      Pupils: Pupils are equal, round, and reactive to light.   Neck:      Thyroid: No thyromegaly.   Cardiovascular:      Rate and Rhythm: Normal rate.   Pulmonary:      Effort: Pulmonary effort is normal.      Breath sounds: No wheezing.   Abdominal:      General: There is no distension.      Palpations: Abdomen is soft. There is no mass.      Tenderness: There is no abdominal tenderness. There is no guarding.   Genitourinary:     Testes:         Right: Mass not " present.         Left: Mass not present.   Lymphadenopathy:      Cervical: No cervical adenopathy.   Skin:     General: Skin is warm and dry.   Neurological:      Mental Status: He is alert and oriented to person, place, and time.   Psychiatric:         Behavior: Behavior normal.         Thought Content: Thought content normal.         Judgment: Judgment normal.           Assessment and Plan:           Problem List Items Addressed This Visit    None         Follow up:   Reviewed PSA, MRI results.  Recommend 6 months with PSA and MRI.    David Rhodes

## 2022-03-16 ENCOUNTER — OFFICE VISIT (OUTPATIENT)
Dept: FAMILY MEDICINE | Facility: CLINIC | Age: 72
End: 2022-03-16
Payer: MEDICARE

## 2022-03-16 VITALS
HEART RATE: 83 BPM | BODY MASS INDEX: 25.81 KG/M2 | DIASTOLIC BLOOD PRESSURE: 85 MMHG | SYSTOLIC BLOOD PRESSURE: 134 MMHG | TEMPERATURE: 98 F | HEIGHT: 70 IN | WEIGHT: 180.31 LBS | OXYGEN SATURATION: 97 %

## 2022-03-16 DIAGNOSIS — G47.09 OTHER INSOMNIA: Primary | ICD-10-CM

## 2022-03-16 DIAGNOSIS — Z23 ENCOUNTER FOR ADMINISTRATION OF VACCINE: ICD-10-CM

## 2022-03-16 DIAGNOSIS — I10 ESSENTIAL HYPERTENSION: ICD-10-CM

## 2022-03-16 DIAGNOSIS — N40.1 BPH WITH OBSTRUCTION/LOWER URINARY TRACT SYMPTOMS: ICD-10-CM

## 2022-03-16 DIAGNOSIS — C61 PROSTATE CANCER: ICD-10-CM

## 2022-03-16 DIAGNOSIS — N13.8 BPH WITH OBSTRUCTION/LOWER URINARY TRACT SYMPTOMS: ICD-10-CM

## 2022-03-16 DIAGNOSIS — E66.3 OVERWEIGHT (BMI 25.0-29.9): ICD-10-CM

## 2022-03-16 PROCEDURE — 1159F MED LIST DOCD IN RCRD: CPT | Mod: HCNC,CPTII,S$GLB, | Performed by: FAMILY MEDICINE

## 2022-03-16 PROCEDURE — 99999 PR PBB SHADOW E&M-EST. PATIENT-LVL III: CPT | Mod: PBBFAC,HCNC,, | Performed by: FAMILY MEDICINE

## 2022-03-16 PROCEDURE — 3079F PR MOST RECENT DIASTOLIC BLOOD PRESSURE 80-89 MM HG: ICD-10-PCS | Mod: HCNC,CPTII,S$GLB, | Performed by: FAMILY MEDICINE

## 2022-03-16 PROCEDURE — 99999 PR PBB SHADOW E&M-EST. PATIENT-LVL III: ICD-10-PCS | Mod: PBBFAC,HCNC,, | Performed by: FAMILY MEDICINE

## 2022-03-16 PROCEDURE — 3288F FALL RISK ASSESSMENT DOCD: CPT | Mod: HCNC,CPTII,S$GLB, | Performed by: FAMILY MEDICINE

## 2022-03-16 PROCEDURE — 99499 UNLISTED E&M SERVICE: CPT | Mod: HCNC,S$GLB,, | Performed by: FAMILY MEDICINE

## 2022-03-16 PROCEDURE — 1101F PR PT FALLS ASSESS DOC 0-1 FALLS W/OUT INJ PAST YR: ICD-10-PCS | Mod: HCNC,CPTII,S$GLB, | Performed by: FAMILY MEDICINE

## 2022-03-16 PROCEDURE — 1101F PT FALLS ASSESS-DOCD LE1/YR: CPT | Mod: HCNC,CPTII,S$GLB, | Performed by: FAMILY MEDICINE

## 2022-03-16 PROCEDURE — 3008F BODY MASS INDEX DOCD: CPT | Mod: HCNC,CPTII,S$GLB, | Performed by: FAMILY MEDICINE

## 2022-03-16 PROCEDURE — 1125F PR PAIN SEVERITY QUANTIFIED, PAIN PRESENT: ICD-10-PCS | Mod: HCNC,CPTII,S$GLB, | Performed by: FAMILY MEDICINE

## 2022-03-16 PROCEDURE — 3079F DIAST BP 80-89 MM HG: CPT | Mod: HCNC,CPTII,S$GLB, | Performed by: FAMILY MEDICINE

## 2022-03-16 PROCEDURE — 1160F RVW MEDS BY RX/DR IN RCRD: CPT | Mod: HCNC,CPTII,S$GLB, | Performed by: FAMILY MEDICINE

## 2022-03-16 PROCEDURE — 99499 RISK ADDL DX/OHS AUDIT: ICD-10-PCS | Mod: HCNC,S$GLB,, | Performed by: FAMILY MEDICINE

## 2022-03-16 PROCEDURE — 3288F PR FALLS RISK ASSESSMENT DOCUMENTED: ICD-10-PCS | Mod: HCNC,CPTII,S$GLB, | Performed by: FAMILY MEDICINE

## 2022-03-16 PROCEDURE — 1159F PR MEDICATION LIST DOCUMENTED IN MEDICAL RECORD: ICD-10-PCS | Mod: HCNC,CPTII,S$GLB, | Performed by: FAMILY MEDICINE

## 2022-03-16 PROCEDURE — 3075F PR MOST RECENT SYSTOLIC BLOOD PRESS GE 130-139MM HG: ICD-10-PCS | Mod: HCNC,CPTII,S$GLB, | Performed by: FAMILY MEDICINE

## 2022-03-16 PROCEDURE — 99214 OFFICE O/P EST MOD 30 MIN: CPT | Mod: HCNC,S$GLB,, | Performed by: FAMILY MEDICINE

## 2022-03-16 PROCEDURE — 3075F SYST BP GE 130 - 139MM HG: CPT | Mod: HCNC,CPTII,S$GLB, | Performed by: FAMILY MEDICINE

## 2022-03-16 PROCEDURE — 99214 PR OFFICE/OUTPT VISIT, EST, LEVL IV, 30-39 MIN: ICD-10-PCS | Mod: HCNC,S$GLB,, | Performed by: FAMILY MEDICINE

## 2022-03-16 PROCEDURE — 1160F PR REVIEW ALL MEDS BY PRESCRIBER/CLIN PHARMACIST DOCUMENTED: ICD-10-PCS | Mod: HCNC,CPTII,S$GLB, | Performed by: FAMILY MEDICINE

## 2022-03-16 PROCEDURE — 1125F AMNT PAIN NOTED PAIN PRSNT: CPT | Mod: HCNC,CPTII,S$GLB, | Performed by: FAMILY MEDICINE

## 2022-03-16 PROCEDURE — 3008F PR BODY MASS INDEX (BMI) DOCUMENTED: ICD-10-PCS | Mod: HCNC,CPTII,S$GLB, | Performed by: FAMILY MEDICINE

## 2022-03-16 RX ORDER — TRAZODONE HYDROCHLORIDE 50 MG/1
50 TABLET ORAL NIGHTLY PRN
Qty: 60 TABLET | Refills: 3 | Status: SHIPPED | OUTPATIENT
Start: 2022-03-16 | End: 2022-05-26

## 2022-03-16 RX ORDER — AMLODIPINE BESYLATE 5 MG/1
5 TABLET ORAL DAILY
Qty: 90 TABLET | Refills: 1 | Status: SHIPPED | OUTPATIENT
Start: 2022-03-16 | End: 2022-08-25 | Stop reason: SDUPTHER

## 2022-03-16 RX ORDER — AMLODIPINE BESYLATE 5 MG/1
5 TABLET ORAL DAILY
Qty: 90 TABLET | Refills: 0 | Status: SHIPPED | OUTPATIENT
Start: 2022-03-16 | End: 2022-03-16 | Stop reason: SDUPTHER

## 2022-03-16 NOTE — PROGRESS NOTES
"Physical Exam  /85 (BP Location: Left arm, Patient Position: Sitting, BP Method: Medium (Manual))   Pulse 83   Temp 98 °F (36.7 °C) (Oral)   Ht 5' 10" (1.778 m)   Wt 81.8 kg (180 lb 5.4 oz)   SpO2 97%   BMI 25.88 kg/m²      Office Visit    Patient Name: Sha Triplett    : 1950  MRN: [id number]      Assessment/Plan:  Sha Triplett is a 71 y.o. male who presents today for :    Other insomnia  -     traZODone (DESYREL) 50 MG tablet; Take 1 tablet (50 mg total) by mouth nightly as needed for Insomnia.  Dispense: 60 tablet; Refill: 3  -we again discussed at length regarding the risks and benefits of sleeping medications. We reviewed the need to cut down caffeine intake, as well as limit fluids in the evening times and stress management. Will also start on a trial of PRN Trazodone - potential side effects associated with medication reviewed   with patient, which patient voices understanding.    -HTN  -     amLODIPine (NORVASC) 5 MG tablet; Take 1 tablet (5 mg total) by mouth once daily. Followup Dr.T Kee 2022 for more refills.  Dispense: 90 tablet; Refill: 1  -     Hemoglobin A1C; Future; Expected date: 2022  -     CBC Without Differential; Future; Expected date: 2022  -     Comprehensive Metabolic Panel; Future; Expected date: 2022  -     Lipid Panel; Future; Expected date: 2022  Overweight (BMI 25.0-29.9)  -restart antihypertensive medication based on home readings.  -DASH diet, continue regular cardiovascular exercises    BPH  Prostate cancer - s/p Prostate Bx 19 - s/p HIFU 21  -stable, limit evening fluids  -repeat PSA and f/u urology regularly as scheduled        Follow up 6mo    This note was created by combination of typed  and MModal dictation.  Transcription errors may be present.  If there are any questions, please contact " me.            ----------------------------------------------------------------------------------------------------------------------      HPI:  Patient Care Team:  Jude Kee MD as PCP - General (Family Medicine)  HARRIET Vazquez MD as Consulting Physician (Urology)  Filiberto Varela OD as Consulting Physician (Optometry)  Eli Caicedo MA (Inactive) as Care Coordinator    Sha is a 71 y.o. male with      Patient Active Problem List   Diagnosis    Essential hypertension    Nocturia    Prostate cancer - s/p Prostate Bx 11/25/19 - s/p HIFU 7/6/21    Decreased ROM of intervertebral discs of thoracic spine    Thoracic spine pain    Overweight (BMI 25.0-29.9)    Other male erectile dysfunction - controlled on PRN Viagra    Refractive error    Nuclear sclerosis, bilateral    Cortical age-related cataract of both eyes    Hepatitis C virus infection - cured s/p antiviral treatment - SVR12 - 1/2022 (g1a, F2-3)       Patient with PMHx as above presents today for :  Insomnia    The past few weeks, with only being able to stay asleep for 3-4 hours before waking up in the middle of the night and not able to get back to sleep. Of note, he has had on/off insomnia the past year, for which he had tried Melatonin and Benadryl as needed with some success, but they dont' seem effective the past few weeks. He admits that he has increased his coffee consumption to about 2-3 large cups the past 2 months. He also feels slightly stressed with having to handle his late brother's property - though he doesn't feel that he needs to take any medication for anxiety. He denies depression/SI/HI. His BP has previously been diet controlled but recently has climbed into the 150s/90s at home. He denies CP/SOB/HAINES/palpitations.       Additional ROS      CONST: no fever, +activity change  EYES: no vision change.   ENT: no sore throat. No dysphagia.   CV: no CP with exertion  RESP: no SOB  GI: no N/V/diarrhea/constipation  : no  urinary concerns  MSK: no new myalgias or arthralgias.   SKIN: no new rashes  NEURO: no focal deficits.   PSYCH: see HPI                Patient Active Problem List   Diagnosis    Essential hypertension    Nocturia    Prostate cancer - s/p Prostate Bx 11/25/19 - s/p HIFU 7/6/21    Decreased ROM of intervertebral discs of thoracic spine    Thoracic spine pain    Overweight (BMI 25.0-29.9)    Other male erectile dysfunction - controlled on PRN Viagra    Refractive error    Nuclear sclerosis, bilateral    Cortical age-related cataract of both eyes    Hepatitis C virus infection - cured s/p antiviral treatment - SVR12 - 1/2022 (g1a, F2-3)       Current Medications  Medications reviewed/updated.     Current Outpatient Medications on File Prior to Visit   Medication Sig Dispense Refill    b complex vitamins tablet Take 1 tablet by mouth once daily.      cyanocobalamin, vitamin B-12, 50 mcg tablet Take 50 mcg by mouth once daily.      diphenhydrAMINE (BENADRYL) 50 MG tablet Take 50 mg by mouth nightly as needed for Itching.      fish oil-omega-3 fatty acids 300-1,000 mg capsule Take 2 g by mouth once daily.      milk thistle 175 mg tablet Take 175 mg by mouth once daily.      multivitamin capsule Take 1 capsule by mouth once daily.      PSYLLIUM SEED, WITH DEXTROSE, (CILIUM ORAL) Take by mouth.      tamsulosin (FLOMAX) 0.4 mg Cap Take 1 capsule (0.4 mg total) by mouth once daily. (Patient not taking: Reported on 8/5/2021) 30 capsule 0     No current facility-administered medications on file prior to visit.           Past Surgical History:   Procedure Laterality Date    ABLATION, PROSTATE, HIGH INTENSITY FOCUSED ULTRASOUND (HIFU)  7/6/2021    Procedure: ABLATION,PROSTATE,HIGH INTENSITY FOCUSED ULTRASOUND (HIFU);  Surgeon: David Rhodes MD;  Location: [facility] OR [address] [place] [address];  Service: Urology;;    COLONOSCOPY W/ BIOPSIES  3/4/15    / [facility].    LIVER BIOPSY  1998    trus/bx 2018      trus/bx  "2019         Family History   Problem Relation Age of Onset    Alzheimer's disease Mother     Macular degeneration Father     Cataracts Father     Blindness Father     Alzheimer's disease Father          35 days after pt's mother    Pulmonary fibrosis Brother     Pneumonia Brother 3    Other Maternal Grandmother         hx?    Other Maternal Grandfather         "diseases in his body"    Other Paternal Grandmother         hx?    Prostate cancer Paternal Grandfather         POSSIBLE (heard "rumors")    Other Paternal Uncle         possibly 1-2 with prostate CA    Prostate cancer Paternal Cousin 62        s/p radioactive pellets    Amblyopia Neg Hx     Diabetes Neg Hx     Glaucoma Neg Hx     Hypertension Neg Hx     Retinal detachment Neg Hx     Strabismus Neg Hx     Stroke Neg Hx     Thyroid disease Neg Hx     Colon polyps Neg Hx        Social History     Socioeconomic History    Marital status:    Tobacco Use    Smoking status: Former Smoker     Packs/day: 0.00    Smokeless tobacco: Never Used   Substance and Sexual Activity    Alcohol use: No    Drug use: Not Currently     Comment: past use of pot and IV drug use after [place]    Sexual activity: Not Currently   Social History Narrative     x 2.  Two bio kids. One adopted.   Retired [employer].  Now a non smoker.  Chris Nam .               Allergies   Review of patient's allergies indicates:  No Known Allergies          Review of Systems  See HPI      [unfilled]  /85 (BP Location: Left arm, Patient Position: Sitting, BP Method: Medium (Manual))   Pulse 83   Temp 98 °F (36.7 °C) (Oral)   Ht 5' 10" (1.778 m)   Wt 81.8 kg (180 lb 5.4 oz)   SpO2 97%   BMI 25.88 kg/m²       GEN: NAD, pleasant  HEENT: NCAT, PERRLA, EOMI, sclera clear, anicteric  NECK: normal, supple  LUNGS: CTAB, no w/r/r, normal respiratory effort  HEART: RRR, normal S1 and S2, no m/r/g, no palpitations, no edema  ABD: s/nt/nd, NABS, no " organomegaly  SKIN: warm and dry with normal turgor, no rashes, no other lesions.   PSYCH: AOx3, appropriate mood and affect.   MSK: extremities warm/well perfused, normal ROM in all 4 extremities, no c/c/e.   NEURO: normal without focal findings, CN II-XII are intact

## 2022-03-31 ENCOUNTER — PATIENT MESSAGE (OUTPATIENT)
Dept: FAMILY MEDICINE | Facility: CLINIC | Age: 72
End: 2022-03-31
Payer: MEDICARE

## 2022-03-31 DIAGNOSIS — G47.09 OTHER INSOMNIA: Primary | ICD-10-CM

## 2022-03-31 RX ORDER — ZOLPIDEM TARTRATE 5 MG/1
5 TABLET ORAL NIGHTLY PRN
Qty: 30 TABLET | Refills: 3 | Status: SHIPPED | OUTPATIENT
Start: 2022-03-31 | End: 2022-05-06 | Stop reason: SDUPTHER

## 2022-04-14 ENCOUNTER — PES CALL (OUTPATIENT)
Dept: ADMINISTRATIVE | Facility: CLINIC | Age: 72
End: 2022-04-14
Payer: MEDICARE

## 2022-05-06 DIAGNOSIS — G47.09 OTHER INSOMNIA: ICD-10-CM

## 2022-05-06 NOTE — TELEPHONE ENCOUNTER
No new care gaps identified.  [facility] Embedded Care Gaps. Reference number: [id number]. 5/06/2022   2:31:07 PM CDT

## 2022-05-09 RX ORDER — ZOLPIDEM TARTRATE 5 MG/1
5 TABLET ORAL NIGHTLY PRN
Qty: 30 TABLET | Refills: 3 | Status: SHIPPED | OUTPATIENT
Start: 2022-05-09 | End: 2022-05-23 | Stop reason: SDUPTHER

## 2022-05-21 ENCOUNTER — PATIENT MESSAGE (OUTPATIENT)
Dept: FAMILY MEDICINE | Facility: CLINIC | Age: 72
End: 2022-05-21
Payer: MEDICARE

## 2022-05-21 DIAGNOSIS — G47.09 OTHER INSOMNIA: ICD-10-CM

## 2022-05-23 NOTE — TELEPHONE ENCOUNTER
No new care gaps identified.  [facility] Embedded Care Gaps. Reference number: [id number]. 5/23/2022   7:52:01 AM CARLOST

## 2022-05-24 RX ORDER — ZOLPIDEM TARTRATE 5 MG/1
5 TABLET ORAL NIGHTLY PRN
Qty: 30 TABLET | Refills: 3 | Status: SHIPPED | OUTPATIENT
Start: 2022-05-24 | End: 2022-08-25 | Stop reason: SDUPTHER

## 2022-05-26 ENCOUNTER — OFFICE VISIT (OUTPATIENT)
Dept: FAMILY MEDICINE | Facility: CLINIC | Age: 72
End: 2022-05-26
Payer: MEDICARE

## 2022-05-26 VITALS
TEMPERATURE: 98 F | DIASTOLIC BLOOD PRESSURE: 79 MMHG | BODY MASS INDEX: 25.44 KG/M2 | HEIGHT: 70 IN | SYSTOLIC BLOOD PRESSURE: 133 MMHG | HEART RATE: 75 BPM | WEIGHT: 177.69 LBS | OXYGEN SATURATION: 100 %

## 2022-05-26 DIAGNOSIS — R09.81 SINUS CONGESTION: ICD-10-CM

## 2022-05-26 DIAGNOSIS — J30.89 NON-SEASONAL ALLERGIC RHINITIS DUE TO OTHER ALLERGIC TRIGGER: Primary | ICD-10-CM

## 2022-05-26 DIAGNOSIS — R09.82 POST-NASAL DRIP: ICD-10-CM

## 2022-05-26 PROCEDURE — 3008F BODY MASS INDEX DOCD: CPT | Mod: CPTII,S$GLB,, | Performed by: FAMILY MEDICINE

## 2022-05-26 PROCEDURE — 3078F DIAST BP <80 MM HG: CPT | Mod: CPTII,S$GLB,, | Performed by: FAMILY MEDICINE

## 2022-05-26 PROCEDURE — 3078F PR MOST RECENT DIASTOLIC BLOOD PRESSURE < 80 MM HG: ICD-10-PCS | Mod: CPTII,S$GLB,, | Performed by: FAMILY MEDICINE

## 2022-05-26 PROCEDURE — 99214 OFFICE O/P EST MOD 30 MIN: CPT | Mod: S$GLB,,, | Performed by: FAMILY MEDICINE

## 2022-05-26 PROCEDURE — 99214 PR OFFICE/OUTPT VISIT, EST, LEVL IV, 30-39 MIN: ICD-10-PCS | Mod: S$GLB,,, | Performed by: FAMILY MEDICINE

## 2022-05-26 PROCEDURE — 1101F PT FALLS ASSESS-DOCD LE1/YR: CPT | Mod: CPTII,S$GLB,, | Performed by: FAMILY MEDICINE

## 2022-05-26 PROCEDURE — 1159F PR MEDICATION LIST DOCUMENTED IN MEDICAL RECORD: ICD-10-PCS | Mod: CPTII,S$GLB,, | Performed by: FAMILY MEDICINE

## 2022-05-26 PROCEDURE — 1160F RVW MEDS BY RX/DR IN RCRD: CPT | Mod: CPTII,S$GLB,, | Performed by: FAMILY MEDICINE

## 2022-05-26 PROCEDURE — 3075F SYST BP GE 130 - 139MM HG: CPT | Mod: CPTII,S$GLB,, | Performed by: FAMILY MEDICINE

## 2022-05-26 PROCEDURE — 99999 PR PBB SHADOW E&M-EST. PATIENT-LVL III: CPT | Mod: PBBFAC,,, | Performed by: FAMILY MEDICINE

## 2022-05-26 PROCEDURE — 3075F PR MOST RECENT SYSTOLIC BLOOD PRESS GE 130-139MM HG: ICD-10-PCS | Mod: CPTII,S$GLB,, | Performed by: FAMILY MEDICINE

## 2022-05-26 PROCEDURE — 3288F PR FALLS RISK ASSESSMENT DOCUMENTED: ICD-10-PCS | Mod: CPTII,S$GLB,, | Performed by: FAMILY MEDICINE

## 2022-05-26 PROCEDURE — 1101F PR PT FALLS ASSESS DOC 0-1 FALLS W/OUT INJ PAST YR: ICD-10-PCS | Mod: CPTII,S$GLB,, | Performed by: FAMILY MEDICINE

## 2022-05-26 PROCEDURE — 1126F AMNT PAIN NOTED NONE PRSNT: CPT | Mod: CPTII,S$GLB,, | Performed by: FAMILY MEDICINE

## 2022-05-26 PROCEDURE — 1160F PR REVIEW ALL MEDS BY PRESCRIBER/CLIN PHARMACIST DOCUMENTED: ICD-10-PCS | Mod: CPTII,S$GLB,, | Performed by: FAMILY MEDICINE

## 2022-05-26 PROCEDURE — 1159F MED LIST DOCD IN RCRD: CPT | Mod: CPTII,S$GLB,, | Performed by: FAMILY MEDICINE

## 2022-05-26 PROCEDURE — 3288F FALL RISK ASSESSMENT DOCD: CPT | Mod: CPTII,S$GLB,, | Performed by: FAMILY MEDICINE

## 2022-05-26 PROCEDURE — 3008F PR BODY MASS INDEX (BMI) DOCUMENTED: ICD-10-PCS | Mod: CPTII,S$GLB,, | Performed by: FAMILY MEDICINE

## 2022-05-26 PROCEDURE — 99999 PR PBB SHADOW E&M-EST. PATIENT-LVL III: ICD-10-PCS | Mod: PBBFAC,,, | Performed by: FAMILY MEDICINE

## 2022-05-26 PROCEDURE — 1126F PR PAIN SEVERITY QUANTIFIED, NO PAIN PRESENT: ICD-10-PCS | Mod: CPTII,S$GLB,, | Performed by: FAMILY MEDICINE

## 2022-05-26 RX ORDER — CETIRIZINE HYDROCHLORIDE 10 MG/1
10 TABLET ORAL NIGHTLY
Qty: 60 TABLET | Refills: 2 | Status: SHIPPED | OUTPATIENT
Start: 2022-05-26

## 2022-05-26 RX ORDER — FLUTICASONE PROPIONATE 50 MCG
1 SPRAY, SUSPENSION (ML) NASAL 2 TIMES DAILY PRN
Qty: 16 ML | Refills: 1 | Status: SHIPPED | OUTPATIENT
Start: 2022-05-26 | End: 2022-05-26

## 2022-05-26 NOTE — PROGRESS NOTES
"  Physical Exam  /79   Pulse 75   Temp 98.3 °F (36.8 °C) (Oral)   Ht 5' 10" (1.778 m)   Wt 80.6 kg (177 lb 11.1 oz)   SpO2 100%   BMI 25.50 kg/m²      Office Visit    Patient Name: Sha Triplett    : 1950  MRN: [id number]      Assessment/Plan:  Sha Triplett is a 71 y.o. male who presents today for :    Non-seasonal allergic rhinitis due to other allergic trigger  -     cetirizine (ZYRTEC) 10 MG tablet; Take 1 tablet (10 mg total) by mouth every evening.  Dispense: 60 tablet; Refill: 2  Sinus congestion  Post-nasal drip  -     fluticasone propionate (FLONASE) 50 mcg/actuation nasal spray; 1 spray (50 mcg total) by Each Nostril route 2 (two) times daily as needed for Rhinitis.  Dispense: 16 mL; Refill: 1  -improving, start anti-histamine PRN daily as well as Flonase.  -continue with nasal saline rinses twice daily as needed.  -discussed use of air humidifier/filter at home, changing AC filters regularly, avoid second-hand smoking.   -supportive therapy and symptomatic management.   -f/u as needed             Follow up for worsening Sx. Urgent care/ED precautions provided.        This note was created by combination of typed  and MModal dictation.  Transcription errors may be present.  If there are any questions, please contact me.      ----------------------------------------------------------------------------------------------------------------------      HPI:  Patient Care Team:  Jude Kee MD as PCP - General (Family Medicine)  HARRIET Vazquez MD as Consulting Physician (Urology)  Filiberto Varela OD as Consulting Physician (Optometry)  Eli Caicedo MA (Inactive) as Care Coordinator    Sha is a 71 y.o. male with      Patient Active Problem List   Diagnosis    Essential hypertension    Nocturia    Prostate cancer - s/p Prostate Bx 19 - s/p HIFU 21    Decreased ROM of intervertebral discs of thoracic spine    Thoracic spine pain    Overweight (BMI " 25.0-29.9)    Other male erectile dysfunction - controlled on PRN Viagra    Refractive error    Nuclear sclerosis, bilateral    Cortical age-related cataract of both eyes    Hepatitis C virus infection - cured s/p antiviral treatment - SVR12 - 1/2022 (g1a, F2-3)       Patient with PMHx as above presents today for a sinus congestion and runny nose, as well as frequent sneezing the past few days. Initially he has throat irritation and coughing, which has mostly resolve. He denies any sick contacts, but admits that he has had some recurring sinus Sx the past few weeks as the weather has gotten warmer, and gets allergy Sx around this time each year. He denies ear pain/chills/CP/SOB/body aches/loss of taste nor smell.        Additional ROS    No dysphagia  No CP/palpitations/swelling  No abd pain/no diarrhea  No rashes      Patient Active Problem List   Diagnosis    Essential hypertension    Nocturia    Prostate cancer - s/p Prostate Bx 11/25/19 - s/p HIFU 7/6/21    Decreased ROM of intervertebral discs of thoracic spine    Thoracic spine pain    Overweight (BMI 25.0-29.9)    Other male erectile dysfunction - controlled on PRN Viagra    Refractive error    Nuclear sclerosis, bilateral    Cortical age-related cataract of both eyes    Hepatitis C virus infection - cured s/p antiviral treatment - SVR12 - 1/2022 (g1a, F2-3)       Current Medications  Medications reviewed and updated.       Current Outpatient Medications:     b complex vitamins tablet, Take 1 tablet by mouth once daily., Disp: , Rfl:     cyanocobalamin, vitamin B-12, 50 mcg tablet, Take 50 mcg by mouth once daily., Disp: , Rfl:     fish oil-omega-3 fatty acids 300-1,000 mg capsule, Take 2 g by mouth once daily., Disp: , Rfl:     milk thistle 175 mg tablet, Take 175 mg by mouth once daily., Disp: , Rfl:     multivitamin capsule, Take 1 capsule by mouth once daily., Disp: , Rfl:     PSYLLIUM SEED, WITH DEXTROSE, (CILIUM ORAL), Take by  "mouth., Disp: , Rfl:     zolpidem (AMBIEN) 5 MG Tab, Take 1 tablet (5 mg total) by mouth nightly as needed (insomnia)., Disp: 30 tablet, Rfl: 3    amLODIPine (NORVASC) 5 MG tablet, Take 1 tablet (5 mg total) by mouth once daily. Followup Dr.T Kee 2022 for more refills. (Patient not taking: Reported on 2022), Disp: 90 tablet, Rfl: 1    cetirizine (ZYRTEC) 10 MG tablet, Take 1 tablet (10 mg total) by mouth every evening., Disp: 60 tablet, Rfl: 2    fluticasone propionate (FLONASE) 50 mcg/actuation nasal spray, 1 spray (50 mcg total) by Each Nostril route 2 (two) times daily as needed for Rhinitis., Disp: 16 mL, Rfl: 1    tamsulosin (FLOMAX) 0.4 mg Cap, Take 1 capsule (0.4 mg total) by mouth once daily. (Patient not taking: No sig reported), Disp: 30 capsule, Rfl: 0    Past Surgical History:   Procedure Laterality Date    ABLATION, PROSTATE, HIGH INTENSITY FOCUSED ULTRASOUND (HIFU)  2021    Procedure: ABLATION,PROSTATE,HIGH INTENSITY FOCUSED ULTRASOUND (HIFU);  Surgeon: David Rhodes MD;  Location: [facility] OR [address] [place] [address];  Service: Urology;;    COLONOSCOPY W/ BIOPSIES  3/4/15    / Dr[facility].    LIVER BIOPSY      trus/bx       trus/bx          Family History   Problem Relation Age of Onset    Alzheimer's disease Mother     Macular degeneration Father     Cataracts Father     Blindness Father     Alzheimer's disease Father          35 days after pt's mother    Pulmonary fibrosis Brother     Pneumonia Brother 3    Macular degeneration Paternal Aunt     Other Paternal Uncle         possibly 1-2 with prostate CA    Other Maternal Grandmother         hx?    Other Maternal Grandfather         "diseases in his body"    Other Paternal Grandmother         hx?    Prostate cancer Paternal Grandfather         POSSIBLE (heard "rumors")    Prostate cancer Paternal Cousin 62        s/p radioactive pellets    Amblyopia Neg Hx     Diabetes Neg Hx     Glaucoma Neg Hx " "    Hypertension Neg Hx     Retinal detachment Neg Hx     Strabismus Neg Hx     Stroke Neg Hx     Thyroid disease Neg Hx     Colon polyps Neg Hx        Social History     Socioeconomic History    Marital status:    Tobacco Use    Smoking status: Former Smoker     Packs/day: 0.00    Smokeless tobacco: Never Used   Substance and Sexual Activity    Alcohol use: No    Drug use: Not Currently     Comment: past use of pot and IV drug use after [place]    Sexual activity: Not Currently   Social History Narrative     x 2.  Two bio kids. One adopted.   Retired [employer].  Now a non smoker.  Chris Nam .               Allergies   Review of patient's allergies indicates:   Allergen Reactions    Sinus allergy Shortness Of Breath             Review of Systems  See HPI      [unfilled]  /79   Pulse 75   Temp 98.3 °F (36.8 °C) (Oral)   Ht 5' 10" (1.778 m)   Wt 80.6 kg (177 lb 11.1 oz)   SpO2 100%   BMI 25.50 kg/m²     GEN: NAD, well developed, pleasant, well nourished  HEENT: NCAT, PERRLA, EOMI, sclera clear, anicteric, TM clear bilaterally, O/P with copious PND but otherwise normal, MMM with +mild cobblestoning, +boggy nasal mucosa with clear nasal discharge, no maxillary/frontal TTP   NECK: normal, supple with midline trachea, no LAD, no thyromegaly  LUNGS: CTAB, no w/r/r, no respiratory distress, no increased work of breathing  HEART: RRR, normal S1 and S2, no m/r/g, no palpitations    ABD: s/nt/nd, NABS, no organomegaly  SKIN: normal turgor, no rashes, no other lesions.   "

## 2022-05-31 ENCOUNTER — PATIENT MESSAGE (OUTPATIENT)
Dept: FAMILY MEDICINE | Facility: CLINIC | Age: 72
End: 2022-05-31
Payer: MEDICARE

## 2022-06-09 ENCOUNTER — PATIENT MESSAGE (OUTPATIENT)
Dept: FAMILY MEDICINE | Facility: CLINIC | Age: 72
End: 2022-06-09
Payer: MEDICARE

## 2022-06-09 ENCOUNTER — NURSE TRIAGE (OUTPATIENT)
Dept: ADMINISTRATIVE | Facility: CLINIC | Age: 72
End: 2022-06-09
Payer: MEDICARE

## 2022-06-09 DIAGNOSIS — R09.81 SINUS CONGESTION: Primary | ICD-10-CM

## 2022-06-10 NOTE — TELEPHONE ENCOUNTER
"Patient states he saw Dr. Roque on 5/26/22 and was diagnosed with a sinus infection and was prescribed Flonase and Cetirizine. Patient states he is taking prescriptions as prescribed.Patient c/o "equilibrium messed up" and "dizziness". Patient also states he veers toward the left and balance/equilibrium is a new onset.     Care Advice to given to Go To ED Now. Patient states that he lives alone and family lives out of state and in another city in [place]. Triage RN advised to activate EMS for transport to ED. Patient stated understanding of Care Advice and cites no additional concerns at this time.    Reason for Disposition   [1] New onset of weakness AND [2] present now    Additional Information   Negative: Unconscious (can't be awakened)   Negative: Difficult to awaken or to keep awake  (Exception: child needs normal sleep)   Negative: Awake but can't move   Negative: Shock suspected (very weak, limp, not moving, too weak to stand, pale cool skin)   Negative: Difficulty breathing or slow, weak breathing   Negative: Followed a head injury   Negative: Followed a neck injury   Negative: Sounds like a life-threatening emergency to the triager   Negative: Weakness only on one side of the body   Negative: Feeling like going to pass out is the main symptom   Negative: Can't stand or walk   Negative: Stiff neck (can't touch chin to chest)   Negative: Confused or not alert when awake    Protocols used: WEAKNESS (GENERALIZED) AND FATIGUE-P-AH      "

## 2022-06-10 NOTE — TELEPHONE ENCOUNTER
Pt was called to give him referral information to ENT. Pt expressed understanding of the directions.

## 2022-06-10 NOTE — TELEPHONE ENCOUNTER
-please let patient know that I have made a referral for patient to see an ENT  doctor for further evaluation and treatment of his persistent sinus Sx, as well as equilibrium issues.    Have patient contact our Referral Coordinator Team at [phone] to schedule an appointment.      Thanks

## 2022-06-19 ENCOUNTER — PATIENT MESSAGE (OUTPATIENT)
Dept: FAMILY MEDICINE | Facility: CLINIC | Age: 72
End: 2022-06-19
Payer: MEDICARE

## 2022-06-20 ENCOUNTER — OFFICE VISIT (OUTPATIENT)
Dept: FAMILY MEDICINE | Facility: CLINIC | Age: 72
End: 2022-06-20
Payer: MEDICARE

## 2022-06-20 ENCOUNTER — TELEPHONE (OUTPATIENT)
Dept: FAMILY MEDICINE | Facility: CLINIC | Age: 72
End: 2022-06-20
Payer: MEDICARE

## 2022-06-20 VITALS
DIASTOLIC BLOOD PRESSURE: 82 MMHG | TEMPERATURE: 99 F | SYSTOLIC BLOOD PRESSURE: 133 MMHG | OXYGEN SATURATION: 95 % | HEART RATE: 82 BPM | BODY MASS INDEX: 25.13 KG/M2 | HEIGHT: 70 IN | WEIGHT: 175.5 LBS

## 2022-06-20 DIAGNOSIS — R09.81 SINUS CONGESTION: ICD-10-CM

## 2022-06-20 DIAGNOSIS — C61 PROSTATE CANCER: ICD-10-CM

## 2022-06-20 DIAGNOSIS — E66.3 OVERWEIGHT (BMI 25.0-29.9): ICD-10-CM

## 2022-06-20 DIAGNOSIS — I10 ESSENTIAL HYPERTENSION: ICD-10-CM

## 2022-06-20 DIAGNOSIS — R09.82 POST-NASAL DRIP: ICD-10-CM

## 2022-06-20 DIAGNOSIS — N13.8 BPH WITH OBSTRUCTION/LOWER URINARY TRACT SYMPTOMS: ICD-10-CM

## 2022-06-20 DIAGNOSIS — N40.1 BPH WITH OBSTRUCTION/LOWER URINARY TRACT SYMPTOMS: ICD-10-CM

## 2022-06-20 DIAGNOSIS — Z77.120 SUSPECTED EXPOSURE TO MOLD: ICD-10-CM

## 2022-06-20 DIAGNOSIS — J30.89 NON-SEASONAL ALLERGIC RHINITIS DUE TO OTHER ALLERGIC TRIGGER: Primary | ICD-10-CM

## 2022-06-20 PROCEDURE — 1126F AMNT PAIN NOTED NONE PRSNT: CPT | Mod: CPTII,S$GLB,, | Performed by: FAMILY MEDICINE

## 2022-06-20 PROCEDURE — 99999 PR PBB SHADOW E&M-EST. PATIENT-LVL III: ICD-10-PCS | Mod: PBBFAC,,, | Performed by: FAMILY MEDICINE

## 2022-06-20 PROCEDURE — 99999 PR PBB SHADOW E&M-EST. PATIENT-LVL III: CPT | Mod: PBBFAC,,, | Performed by: FAMILY MEDICINE

## 2022-06-20 PROCEDURE — 1160F PR REVIEW ALL MEDS BY PRESCRIBER/CLIN PHARMACIST DOCUMENTED: ICD-10-PCS | Mod: CPTII,S$GLB,, | Performed by: FAMILY MEDICINE

## 2022-06-20 PROCEDURE — 1160F RVW MEDS BY RX/DR IN RCRD: CPT | Mod: CPTII,S$GLB,, | Performed by: FAMILY MEDICINE

## 2022-06-20 PROCEDURE — 1101F PR PT FALLS ASSESS DOC 0-1 FALLS W/OUT INJ PAST YR: ICD-10-PCS | Mod: CPTII,S$GLB,, | Performed by: FAMILY MEDICINE

## 2022-06-20 PROCEDURE — 3008F PR BODY MASS INDEX (BMI) DOCUMENTED: ICD-10-PCS | Mod: CPTII,S$GLB,, | Performed by: FAMILY MEDICINE

## 2022-06-20 PROCEDURE — 3288F FALL RISK ASSESSMENT DOCD: CPT | Mod: CPTII,S$GLB,, | Performed by: FAMILY MEDICINE

## 2022-06-20 PROCEDURE — 3008F BODY MASS INDEX DOCD: CPT | Mod: CPTII,S$GLB,, | Performed by: FAMILY MEDICINE

## 2022-06-20 PROCEDURE — 3079F DIAST BP 80-89 MM HG: CPT | Mod: CPTII,S$GLB,, | Performed by: FAMILY MEDICINE

## 2022-06-20 PROCEDURE — 99214 PR OFFICE/OUTPT VISIT, EST, LEVL IV, 30-39 MIN: ICD-10-PCS | Mod: S$GLB,,, | Performed by: FAMILY MEDICINE

## 2022-06-20 PROCEDURE — 3288F PR FALLS RISK ASSESSMENT DOCUMENTED: ICD-10-PCS | Mod: CPTII,S$GLB,, | Performed by: FAMILY MEDICINE

## 2022-06-20 PROCEDURE — 99214 OFFICE O/P EST MOD 30 MIN: CPT | Mod: S$GLB,,, | Performed by: FAMILY MEDICINE

## 2022-06-20 PROCEDURE — 3079F PR MOST RECENT DIASTOLIC BLOOD PRESSURE 80-89 MM HG: ICD-10-PCS | Mod: CPTII,S$GLB,, | Performed by: FAMILY MEDICINE

## 2022-06-20 PROCEDURE — 1159F MED LIST DOCD IN RCRD: CPT | Mod: CPTII,S$GLB,, | Performed by: FAMILY MEDICINE

## 2022-06-20 PROCEDURE — 3075F PR MOST RECENT SYSTOLIC BLOOD PRESS GE 130-139MM HG: ICD-10-PCS | Mod: CPTII,S$GLB,, | Performed by: FAMILY MEDICINE

## 2022-06-20 PROCEDURE — 1159F PR MEDICATION LIST DOCUMENTED IN MEDICAL RECORD: ICD-10-PCS | Mod: CPTII,S$GLB,, | Performed by: FAMILY MEDICINE

## 2022-06-20 PROCEDURE — 3075F SYST BP GE 130 - 139MM HG: CPT | Mod: CPTII,S$GLB,, | Performed by: FAMILY MEDICINE

## 2022-06-20 PROCEDURE — 1101F PT FALLS ASSESS-DOCD LE1/YR: CPT | Mod: CPTII,S$GLB,, | Performed by: FAMILY MEDICINE

## 2022-06-20 PROCEDURE — 1126F PR PAIN SEVERITY QUANTIFIED, NO PAIN PRESENT: ICD-10-PCS | Mod: CPTII,S$GLB,, | Performed by: FAMILY MEDICINE
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
## 2022-06-20 NOTE — LETTER
June 20, 2022      [facility]  [address]  [place] [address]  Phone: [phone]  Fax: [phone]       Patient: Sha Triplett   YOB: 1950  Date of Visit: 06/20/2022    To Whom It May Concern:    Alma Delia Triplett was at [facility] on 06/20/2022. He is a patient of mine for the past few years. Recently, he has been seeing me in the office for management of allergic rhinitis, which can be caused by environmental factors that may include, but are not limited to, dust/pollen/mold.  If you have any questions or concerns, or if I can be of further assistance, please do not hesitate to contact me.    Sincerely,    Jude Kee MD

## 2022-06-22 ENCOUNTER — PATIENT MESSAGE (OUTPATIENT)
Dept: FAMILY MEDICINE | Facility: CLINIC | Age: 72
End: 2022-06-22
Payer: MEDICARE

## 2022-06-22 ENCOUNTER — OFFICE VISIT (OUTPATIENT)
Dept: OTOLARYNGOLOGY | Facility: CLINIC | Age: 72
End: 2022-06-22
Payer: MEDICARE

## 2022-06-22 VITALS
DIASTOLIC BLOOD PRESSURE: 82 MMHG | HEIGHT: 70 IN | WEIGHT: 172.81 LBS | SYSTOLIC BLOOD PRESSURE: 160 MMHG | BODY MASS INDEX: 24.74 KG/M2

## 2022-06-22 DIAGNOSIS — U07.1 COVID-19: ICD-10-CM

## 2022-06-22 DIAGNOSIS — Z11.52 ENCOUNTER FOR SCREENING FOR COVID-19: Primary | ICD-10-CM

## 2022-06-22 DIAGNOSIS — R09.81 SINUS CONGESTION: ICD-10-CM

## 2022-06-22 LAB
CTP QC/QA: YES
SARS-COV-2 AG RESP QL IA.RAPID: POSITIVE

## 2022-06-22 PROCEDURE — 1159F MED LIST DOCD IN RCRD: CPT | Mod: CPTII,S$GLB,, | Performed by: STUDENT IN AN ORGANIZED HEALTH CARE EDUCATION/TRAINING PROGRAM

## 2022-06-22 PROCEDURE — 87811 SARS CORONAVIRUS 2 ANTIGEN POCT, MANUAL READ: ICD-10-PCS | Mod: QW,S$GLB,, | Performed by: STUDENT IN AN ORGANIZED HEALTH CARE EDUCATION/TRAINING PROGRAM

## 2022-06-22 PROCEDURE — 1125F PR PAIN SEVERITY QUANTIFIED, PAIN PRESENT: ICD-10-PCS | Mod: CPTII,S$GLB,, | Performed by: STUDENT IN AN ORGANIZED HEALTH CARE EDUCATION/TRAINING PROGRAM

## 2022-06-22 PROCEDURE — 1160F PR REVIEW ALL MEDS BY PRESCRIBER/CLIN PHARMACIST DOCUMENTED: ICD-10-PCS | Mod: CPTII,S$GLB,, | Performed by: STUDENT IN AN ORGANIZED HEALTH CARE EDUCATION/TRAINING PROGRAM

## 2022-06-22 PROCEDURE — 99203 OFFICE O/P NEW LOW 30 MIN: CPT | Mod: S$GLB,,, | Performed by: STUDENT IN AN ORGANIZED HEALTH CARE EDUCATION/TRAINING PROGRAM

## 2022-06-22 PROCEDURE — 3079F PR MOST RECENT DIASTOLIC BLOOD PRESSURE 80-89 MM HG: ICD-10-PCS | Mod: CPTII,S$GLB,, | Performed by: STUDENT IN AN ORGANIZED HEALTH CARE EDUCATION/TRAINING PROGRAM

## 2022-06-22 PROCEDURE — 1160F RVW MEDS BY RX/DR IN RCRD: CPT | Mod: CPTII,S$GLB,, | Performed by: STUDENT IN AN ORGANIZED HEALTH CARE EDUCATION/TRAINING PROGRAM

## 2022-06-22 PROCEDURE — 3008F BODY MASS INDEX DOCD: CPT | Mod: CPTII,S$GLB,, | Performed by: STUDENT IN AN ORGANIZED HEALTH CARE EDUCATION/TRAINING PROGRAM

## 2022-06-22 PROCEDURE — 3077F SYST BP >= 140 MM HG: CPT | Mod: CPTII,S$GLB,, | Performed by: STUDENT IN AN ORGANIZED HEALTH CARE EDUCATION/TRAINING PROGRAM

## 2022-06-22 PROCEDURE — 3077F PR MOST RECENT SYSTOLIC BLOOD PRESSURE >= 140 MM HG: ICD-10-PCS | Mod: CPTII,S$GLB,, | Performed by: STUDENT IN AN ORGANIZED HEALTH CARE EDUCATION/TRAINING PROGRAM

## 2022-06-22 PROCEDURE — 1125F AMNT PAIN NOTED PAIN PRSNT: CPT | Mod: CPTII,S$GLB,, | Performed by: STUDENT IN AN ORGANIZED HEALTH CARE EDUCATION/TRAINING PROGRAM

## 2022-06-22 PROCEDURE — 1101F PT FALLS ASSESS-DOCD LE1/YR: CPT | Mod: CPTII,S$GLB,, | Performed by: STUDENT IN AN ORGANIZED HEALTH CARE EDUCATION/TRAINING PROGRAM

## 2022-06-22 PROCEDURE — 1159F PR MEDICATION LIST DOCUMENTED IN MEDICAL RECORD: ICD-10-PCS | Mod: CPTII,S$GLB,, | Performed by: STUDENT IN AN ORGANIZED HEALTH CARE EDUCATION/TRAINING PROGRAM

## 2022-06-22 PROCEDURE — 3288F PR FALLS RISK ASSESSMENT DOCUMENTED: ICD-10-PCS | Mod: CPTII,S$GLB,, | Performed by: STUDENT IN AN ORGANIZED HEALTH CARE EDUCATION/TRAINING PROGRAM

## 2022-06-22 PROCEDURE — 3008F PR BODY MASS INDEX (BMI) DOCUMENTED: ICD-10-PCS | Mod: CPTII,S$GLB,, | Performed by: STUDENT IN AN ORGANIZED HEALTH CARE EDUCATION/TRAINING PROGRAM

## 2022-06-22 PROCEDURE — 3079F DIAST BP 80-89 MM HG: CPT | Mod: CPTII,S$GLB,, | Performed by: STUDENT IN AN ORGANIZED HEALTH CARE EDUCATION/TRAINING PROGRAM

## 2022-06-22 PROCEDURE — 87811 SARS-COV-2 COVID19 W/OPTIC: CPT | Mod: QW,S$GLB,, | Performed by: STUDENT IN AN ORGANIZED HEALTH CARE EDUCATION/TRAINING PROGRAM

## 2022-06-22 PROCEDURE — 3288F FALL RISK ASSESSMENT DOCD: CPT | Mod: CPTII,S$GLB,, | Performed by: STUDENT IN AN ORGANIZED HEALTH CARE EDUCATION/TRAINING PROGRAM

## 2022-06-22 PROCEDURE — 99203 PR OFFICE/OUTPT VISIT, NEW, LEVL III, 30-44 MIN: ICD-10-PCS | Mod: S$GLB,,, | Performed by: STUDENT IN AN ORGANIZED HEALTH CARE EDUCATION/TRAINING PROGRAM

## 2022-06-22 PROCEDURE — 1101F PR PT FALLS ASSESS DOC 0-1 FALLS W/OUT INJ PAST YR: ICD-10-PCS | Mod: CPTII,S$GLB,, | Performed by: STUDENT IN AN ORGANIZED HEALTH CARE EDUCATION/TRAINING PROGRAM

## 2022-06-22 NOTE — PATIENT INSTRUCTIONS
Instructions for Patients with Confirmed or Suspected COVID-19    If you are awaiting your test result, you will either be called or it will be released to the patient portal.  If you have any questions about your test, please visit [email or web address] or call our COVID-19 information line at [phone].      Please isolate yourself at home.  You may leave home and/or return to work once the following conditions are met:    If you have symptoms and tested positive:  More than 5 days since symptoms first appeared AND  More than 24 hours fever free without medications AND       symptoms have improved   For five days after ending isolation, masks are required.    If you had no symptoms but tested positive:  More than 5 days since the date of the first positive test. If you develop symptoms, then use the guidelines above  For five days after ending isolation, masks are required.      Testing is not recommended if you are symptom free after completing isolation.

## 2022-06-22 NOTE — PROGRESS NOTES
"  Otolaryngology - Head and Neck Surgery New Patient Visit    6/22/2022    Referring Provider: Jude Kee MD    Chief Complaint   Patient presents with    Headache    Cough    Sinus Problem     Nose always dripping        History of Present Illness, Otolaryngology Specialty-Specific Exam, and Assessment and Plan:     Sha Triplett is a 71 y.o. male who presents for evaluation of chronic sinus infections, which has been present for 1 month. He complains of bilateral nasal congestion, decreased sense of smell, PND, head aches, and facial pressure. He denies rhinitis, epistaxis, or significant visual changes. He has been treated with flonase and allegra in the past. He has never had allergy testing. He denies previous skullbase surgery. He denies snoring.     SNOT-22 score: : (P) 80  NOSE score:: (P) 90%  ETDQ-7 score:: (P) 3.9    Impression today is COVID-19 infection.     Discussed inoffice test and that he is required to self isolate.     You may leave home and/or return to work when the following conditions are met:  · 24 hours fever free without fever-reducing medications AND  · Improved symptoms  · You are fully vaccinated or have not had close contact with someone with COVID-19 (within 6 feet for 15 minutes or more)    If you are fully vaccinated and had a close contact, there is no need for quarantine, unless you develop symptoms.      If you are not fully vaccinated and had a close contact:  · Retest at 5 to 7 days post-exposure  · If possible, it is recommended that you quarantine for 5 days from the time of contact regardless of your test status.  A mask should be worn indoors post quarantine.     Thank you for allowing us to participate in the care of your patient. We will continue to keep you informed of his progress.    Sincerely yours,    Gallito Veras MD      Objective     Physical Examination  Vitals -  height is 5' 10" (1.778 m) and weight is 78.4 kg (172 lb 13.5 oz).   Constitutional - " "General appearance: Normal. Ability to communicate: Normal.  Head & Face - Overall appearance, scars, masses: Normal. Palpation &/or percussion of face: Normal. Salivary glands: Normal. Facial strength: Normal  ENMT - Otoscopic exam: Normal. Assessment of hearing: Normal. External inspection: Normal. Nasal mucosa, septum, turbinates: Abnormal see exam details. Lips, teeth, gums: Normal. Oropharynx: Normal. Pharyngeal walls/pyriform sinus: Normal. Larynx: Normal. Nasopharynx: Normal  Neck - Neck: Normal. Thyroid: Normal  Lymphatic - Palpation of lymph nodes: Normal  Eyes - Ocular mobility: Normal  Respiratory - Inspection of Chest: Normal  Cardiovascular - Peripheral vascular system: Normal  Neurological/Psychiatric - Orientation: Normal    Review of Systems  Review of Systems   Constitutional: Positive for malaise/fatigue.   Respiratory: Positive for cough.    Cardiovascular: Negative.    Gastrointestinal: Positive for abdominal pain and constipation.   Musculoskeletal: Positive for back pain.   Skin: Negative.    Neurological: Positive for headaches.   Psychiatric/Behavioral: Positive for depression. The patient is nervous/anxious.       Answers for HPI/ROS submitted by the patient on 6/22/2022  appetite change : Yes  sinus pressure : Yes  Sinus infection(s)?: Yes  postnasal drip: Yes  Voice Change?: Yes  Snoring?: Yes  Sleep Apnea?: Yes  Urinating too frequently?: Yes  Seasonal Allergies?: Yes  Cold all of the time? : Yes  Light-headedness: Yes  None of these: Yes  decreased concentration: Yes  sleep disturbance: Yes    A complete review of systems was obtained 06/22/2022 and reviewed.  The review of systems is negative for symptoms except as described above.    Ht 5' 10" (1.778 m)   Wt 78.4 kg (172 lb 13.5 oz)   BMI 24.80 kg/m²      Nasal Endoscopy:  6/22/2022    Deferred due to his COVID test.     Data Reviewed    WBC (K/uL)   Date Value   05/04/2021 4.97     Eosinophil % (%)   Date Value   03/27/2018 1.6 "     Eos # (K/uL)   Date Value   03/27/2018 0.1     Platelets (K/uL)   Date Value   05/04/2021 268     Glucose (mg/dL)   Date Value   05/04/2021 85   05/04/2021 85     No results found for: IGE    No sinus imaging available.

## 2022-07-13 ENCOUNTER — LAB VISIT (OUTPATIENT)
Dept: LAB | Facility: HOSPITAL | Age: 72
End: 2022-07-13
Attending: PHYSICIAN ASSISTANT
Payer: MEDICARE

## 2022-07-13 ENCOUNTER — OFFICE VISIT (OUTPATIENT)
Dept: OTOLARYNGOLOGY | Facility: CLINIC | Age: 72
End: 2022-07-13
Attending: PHYSICIAN ASSISTANT
Payer: MEDICARE

## 2022-07-13 VITALS — WEIGHT: 171.63 LBS | HEIGHT: 70 IN | BODY MASS INDEX: 24.57 KG/M2

## 2022-07-13 DIAGNOSIS — M95.0 NASAL VALVE COLLAPSE: ICD-10-CM

## 2022-07-13 DIAGNOSIS — J34.2 NASAL SEPTAL DEVIATION: Primary | ICD-10-CM

## 2022-07-13 DIAGNOSIS — Z86.19 HEPATITIS C VIRUS INFECTION CURED AFTER ANTIVIRAL DRUG THERAPY: ICD-10-CM

## 2022-07-13 DIAGNOSIS — J34.3 HYPERTROPHY OF BOTH INFERIOR NASAL TURBINATES: ICD-10-CM

## 2022-07-13 PROCEDURE — 31231 NASAL ENDOSCOPY DX: CPT | Mod: S$GLB,,, | Performed by: STUDENT IN AN ORGANIZED HEALTH CARE EDUCATION/TRAINING PROGRAM

## 2022-07-13 PROCEDURE — 3008F BODY MASS INDEX DOCD: CPT | Mod: CPTII,S$GLB,, | Performed by: STUDENT IN AN ORGANIZED HEALTH CARE EDUCATION/TRAINING PROGRAM

## 2022-07-13 PROCEDURE — 99213 OFFICE O/P EST LOW 20 MIN: CPT | Mod: 25,S$GLB,, | Performed by: STUDENT IN AN ORGANIZED HEALTH CARE EDUCATION/TRAINING PROGRAM

## 2022-07-13 PROCEDURE — 3288F FALL RISK ASSESSMENT DOCD: CPT | Mod: CPTII,S$GLB,, | Performed by: STUDENT IN AN ORGANIZED HEALTH CARE EDUCATION/TRAINING PROGRAM

## 2022-07-13 PROCEDURE — 1101F PT FALLS ASSESS-DOCD LE1/YR: CPT | Mod: CPTII,S$GLB,, | Performed by: STUDENT IN AN ORGANIZED HEALTH CARE EDUCATION/TRAINING PROGRAM

## 2022-07-13 PROCEDURE — 31231 PR NASAL ENDOSCOPY, DX: ICD-10-PCS | Mod: S$GLB,,, | Performed by: STUDENT IN AN ORGANIZED HEALTH CARE EDUCATION/TRAINING PROGRAM

## 2022-07-13 PROCEDURE — 36415 COLL VENOUS BLD VENIPUNCTURE: CPT | Mod: PO | Performed by: PHYSICIAN ASSISTANT

## 2022-07-13 PROCEDURE — 3288F PR FALLS RISK ASSESSMENT DOCUMENTED: ICD-10-PCS | Mod: CPTII,S$GLB,, | Performed by: STUDENT IN AN ORGANIZED HEALTH CARE EDUCATION/TRAINING PROGRAM

## 2022-07-13 PROCEDURE — 1101F PR PT FALLS ASSESS DOC 0-1 FALLS W/OUT INJ PAST YR: ICD-10-PCS | Mod: CPTII,S$GLB,, | Performed by: STUDENT IN AN ORGANIZED HEALTH CARE EDUCATION/TRAINING PROGRAM

## 2022-07-13 PROCEDURE — 1126F PR PAIN SEVERITY QUANTIFIED, NO PAIN PRESENT: ICD-10-PCS | Mod: CPTII,S$GLB,, | Performed by: STUDENT IN AN ORGANIZED HEALTH CARE EDUCATION/TRAINING PROGRAM

## 2022-07-13 PROCEDURE — 1126F AMNT PAIN NOTED NONE PRSNT: CPT | Mod: CPTII,S$GLB,, | Performed by: STUDENT IN AN ORGANIZED HEALTH CARE EDUCATION/TRAINING PROGRAM

## 2022-07-13 PROCEDURE — 1160F PR REVIEW ALL MEDS BY PRESCRIBER/CLIN PHARMACIST DOCUMENTED: ICD-10-PCS | Mod: CPTII,S$GLB,, | Performed by: STUDENT IN AN ORGANIZED HEALTH CARE EDUCATION/TRAINING PROGRAM

## 2022-07-13 PROCEDURE — 3008F PR BODY MASS INDEX (BMI) DOCUMENTED: ICD-10-PCS | Mod: CPTII,S$GLB,, | Performed by: STUDENT IN AN ORGANIZED HEALTH CARE EDUCATION/TRAINING PROGRAM

## 2022-07-13 PROCEDURE — 99213 PR OFFICE/OUTPT VISIT, EST, LEVL III, 20-29 MIN: ICD-10-PCS | Mod: 25,S$GLB,, | Performed by: STUDENT IN AN ORGANIZED HEALTH CARE EDUCATION/TRAINING PROGRAM

## 2022-07-13 PROCEDURE — 87522 HEPATITIS C REVRS TRNSCRPJ: CPT | Performed by: PHYSICIAN ASSISTANT

## 2022-07-13 PROCEDURE — 1159F MED LIST DOCD IN RCRD: CPT | Mod: CPTII,S$GLB,, | Performed by: STUDENT IN AN ORGANIZED HEALTH CARE EDUCATION/TRAINING PROGRAM

## 2022-07-13 PROCEDURE — 1159F PR MEDICATION LIST DOCUMENTED IN MEDICAL RECORD: ICD-10-PCS | Mod: CPTII,S$GLB,, | Performed by: STUDENT IN AN ORGANIZED HEALTH CARE EDUCATION/TRAINING PROGRAM

## 2022-07-13 PROCEDURE — 1160F RVW MEDS BY RX/DR IN RCRD: CPT | Mod: CPTII,S$GLB,, | Performed by: STUDENT IN AN ORGANIZED HEALTH CARE EDUCATION/TRAINING PROGRAM

## 2022-07-13 NOTE — PROGRESS NOTES
"    Subjective:      Sha is a 72 y.o. male who comes for follow-up of sinusitis.  His last visit with me was on 6/22/2022.  He was found to be COVID +ve. Has finished isolation period, feels much better today. Nasal congestion improved, no drainage from nose.     His current sinus regime consists of: Floanse.     SNOT-22 score: : (P) 82  NOSE score:: (P) 70%  ETDQ-7 score:: (P) 2    The patient's medications, allergies, past medical, surgical, social and family histories were reviewed and updated as appropriate.    Review of Systems   Constitutional: Positive for fever and malaise/fatigue.   HENT: Positive for sore throat.    Eyes: Negative.    Respiratory: Positive for cough and shortness of breath.    Cardiovascular: Negative.    Gastrointestinal: Negative.    Musculoskeletal: Positive for back pain.   Skin: Negative.    Neurological: Positive for headaches.   Psychiatric/Behavioral: Positive for depression. The patient is nervous/anxious.       Answers for HPI/ROS submitted by the patient on 7/6/2022  appetite change : Yes  sinus pressure : Yes  Sinus infection(s)?: Yes  postnasal drip: Yes  Snoring?: Yes  Sleep Apnea?: Yes  Difficulty urinating?: Yes  Seasonal Allergies?: Yes  Cold all of the time? : Yes  None of these: Yes  decreased concentration: Yes  sleep disturbance: Yes    A detailed review of systems was obtained with pertinent positives as per the above HPI, and otherwise negative.        Objective:     Ht 5' 10" (1.778 m)   Wt 77.9 kg (171 lb 10.1 oz)   BMI 24.63 kg/m²        Constitutional:   Vital signs are normal. He appears well-developed and well-nourished.     Head:  Normocephalic and atraumatic.     Ears:  Hearing normal to normal and whispered voice; external ear normal without scars, lesions, or masses; ear canal, tympanic membrane, and middle ear normal..   Right Ear: No swelling. Tympanic membrane is not perforated and not bulging. No middle ear effusion.   Left Ear: No swelling. " Tympanic membrane is not perforated and not bulging.  No middle ear effusion.     Nose:  Nose normal including turbinates, nasal mucosa, sinuses and nasal septum. No epistaxis.     Mouth/Throat  Oropharynx clear and moist without lesions or asymmetry and normal uvula midline. Normal dentition. No tonsillar abscesses. Tonsillar exudate.      Neck:  Neck normal without thyromegaly masses, asymmetry, normal tracheal structure, crepitus, and tenderness, thyroid normal, trachea normal, phonation normal, full range of motion with neck supple and no adenopathy. No stridor present.        Head (right side): No submental adenopathy present.        Head (left side): No submental adenopathy present.     He has no cervical adenopathy.     Pulmonary/Chest:   No stridor.     Positive modified Omari.     Procedure    Nasal endoscopy performed.  See procedure note.    Nasal Endoscopy:  7/13/2022    The use of diagnostic nasal endoscopy was considered medically necessary for the evaluation and visualization of the nasal anatomy for symptoms suggestive of nasal or sinus origin. Physical examination (including a nasal speculum evaluation) did not provide sufficient clinical information to establish a diagnosis, or symptoms did not improve or worsened following treatment.     The nasal cavity was decongested with topical 1% phenylephrine and anesthetized with 4% lidocaine.  A rigid 0-degree endoscope was introduced into the nasal cavity.    The patient was seated in the examination chair. After discussion of risks and benefits, a nasal endoscope was inserted into the nose the endoscope was passed along the left nasal floor to the nasopharynx. It was then passed between the middle and superior meatus, nasal turbinates, nasal septum, nasopharynx and sphenoethmoid region. The nasal endoscope was withdrawn and there was no complications. An identical procedure was performed on the right side. I was present for the entire procedure.The  patient tolerated the above procedure well. The findings of this procedure can be found in the dictated note from 7/13/2022 visit.                                  Left sided deviated nasal septum. Hypertrophic inferior turbinates. No nasal masses or nasal polyposis. Normal NP. Larynx WNL.     Data Reviewed    WBC (K/uL)   Date Value   05/04/2021 4.97     Eosinophil % (%)   Date Value   03/27/2018 1.6     Eos # (K/uL)   Date Value   03/27/2018 0.1     Platelets (K/uL)   Date Value   05/04/2021 268     Glucose (mg/dL)   Date Value   05/04/2021 85   05/04/2021 85     No results found for: IGE    No sinus imaging available.      Assessment:     1. Nasal septal deviation    2. Hypertrophy of both inferior nasal turbinates    3. Nasal valve collapse         Plan:     - Cont sinus irrigations  - Discussed breath right strips for his NVC  - RTC PRWILLIAM Veras MD

## 2022-07-15 LAB — HCV RNA SERPL NAA+PROBE-ACNC: NORMAL IU/ML

## 2022-08-22 ENCOUNTER — HOSPITAL ENCOUNTER (OUTPATIENT)
Dept: RADIOLOGY | Facility: HOSPITAL | Age: 72
Discharge: HOME | End: 2022-08-22
Attending: UROLOGY
Payer: MEDICARE

## 2022-08-22 DIAGNOSIS — C61 PROSTATE CANCER: ICD-10-CM

## 2022-08-22 PROCEDURE — 72197 MRI PELVIS W/O & W/DYE: CPT | Mod: TC

## 2022-08-22 PROCEDURE — A9585 GADOBUTROL INJECTION: HCPCS | Performed by: UROLOGY

## 2022-08-22 PROCEDURE — 72197 MRI PELVIS W/O & W/DYE: CPT | Mod: 26,,, | Performed by: STUDENT IN AN ORGANIZED HEALTH CARE EDUCATION/TRAINING PROGRAM

## 2022-08-22 PROCEDURE — 25500020 PHARM REV CODE 255: Performed by: UROLOGY

## 2022-08-22 PROCEDURE — 72197 MRI PROSTATE W W/O CONTRAST: ICD-10-PCS | Mod: 26,,, | Performed by: STUDENT IN AN ORGANIZED HEALTH CARE EDUCATION/TRAINING PROGRAM

## 2022-08-22 RX ORDER — GADOBUTROL 604.72 MG/ML
10 INJECTION INTRAVENOUS
Status: COMPLETED | OUTPATIENT
Start: 2022-08-22 | End: 2022-08-22

## 2022-08-22 RX ADMIN — GADOBUTROL 10 ML: 604.72 INJECTION INTRAVENOUS at 12:08

## 2022-08-25 ENCOUNTER — OFFICE VISIT (OUTPATIENT)
Dept: FAMILY MEDICINE | Facility: CLINIC | Age: 72
End: 2022-08-25
Payer: MEDICARE

## 2022-08-25 ENCOUNTER — OFFICE VISIT (OUTPATIENT)
Dept: UROLOGY | Facility: CLINIC | Age: 72
End: 2022-08-25
Payer: MEDICARE

## 2022-08-25 VITALS — HEIGHT: 70 IN | WEIGHT: 172 LBS | BODY MASS INDEX: 24.62 KG/M2 | TEMPERATURE: 98 F

## 2022-08-25 VITALS
HEIGHT: 70 IN | DIASTOLIC BLOOD PRESSURE: 80 MMHG | TEMPERATURE: 98 F | BODY MASS INDEX: 24.81 KG/M2 | WEIGHT: 173.31 LBS | SYSTOLIC BLOOD PRESSURE: 138 MMHG | HEART RATE: 76 BPM | OXYGEN SATURATION: 98 %

## 2022-08-25 DIAGNOSIS — N40.1 BPH WITH OBSTRUCTION/LOWER URINARY TRACT SYMPTOMS: ICD-10-CM

## 2022-08-25 DIAGNOSIS — G47.09 OTHER INSOMNIA: ICD-10-CM

## 2022-08-25 DIAGNOSIS — C61 PROSTATE CANCER: Primary | ICD-10-CM

## 2022-08-25 DIAGNOSIS — I10 ESSENTIAL HYPERTENSION: ICD-10-CM

## 2022-08-25 DIAGNOSIS — C61 PROSTATE CANCER: ICD-10-CM

## 2022-08-25 DIAGNOSIS — N52.8 OTHER MALE ERECTILE DYSFUNCTION: ICD-10-CM

## 2022-08-25 DIAGNOSIS — I73.9 PAD (PERIPHERAL ARTERY DISEASE): ICD-10-CM

## 2022-08-25 DIAGNOSIS — Z00.00 ANNUAL PHYSICAL EXAM: Primary | ICD-10-CM

## 2022-08-25 DIAGNOSIS — N13.8 BPH WITH OBSTRUCTION/LOWER URINARY TRACT SYMPTOMS: ICD-10-CM

## 2022-08-25 DIAGNOSIS — B18.2 HEP C W/O COMA, CHRONIC: ICD-10-CM

## 2022-08-25 PROCEDURE — 1126F PR PAIN SEVERITY QUANTIFIED, NO PAIN PRESENT: ICD-10-PCS | Mod: CPTII,S$GLB,, | Performed by: UROLOGY

## 2022-08-25 PROCEDURE — 3288F PR FALLS RISK ASSESSMENT DOCUMENTED: ICD-10-PCS | Mod: CPTII,S$GLB,, | Performed by: FAMILY MEDICINE

## 2022-08-25 PROCEDURE — 99999 PR PBB SHADOW E&M-EST. PATIENT-LVL IV: CPT | Mod: PBBFAC,,, | Performed by: FAMILY MEDICINE

## 2022-08-25 PROCEDURE — 99499 UNLISTED E&M SERVICE: CPT | Mod: S$GLB,,, | Performed by: FAMILY MEDICINE

## 2022-08-25 PROCEDURE — 3075F SYST BP GE 130 - 139MM HG: CPT | Mod: CPTII,S$GLB,, | Performed by: FAMILY MEDICINE

## 2022-08-25 PROCEDURE — 1159F MED LIST DOCD IN RCRD: CPT | Mod: CPTII,S$GLB,, | Performed by: UROLOGY

## 2022-08-25 PROCEDURE — 3008F BODY MASS INDEX DOCD: CPT | Mod: CPTII,S$GLB,, | Performed by: UROLOGY

## 2022-08-25 PROCEDURE — 99999 PR PBB SHADOW E&M-EST. PATIENT-LVL IV: ICD-10-PCS | Mod: PBBFAC,,, | Performed by: FAMILY MEDICINE

## 2022-08-25 PROCEDURE — 3008F PR BODY MASS INDEX (BMI) DOCUMENTED: ICD-10-PCS | Mod: CPTII,S$GLB,, | Performed by: UROLOGY

## 2022-08-25 PROCEDURE — 1159F MED LIST DOCD IN RCRD: CPT | Mod: CPTII,S$GLB,, | Performed by: FAMILY MEDICINE

## 2022-08-25 PROCEDURE — 3044F PR MOST RECENT HEMOGLOBIN A1C LEVEL <7.0%: ICD-10-PCS | Mod: CPTII,S$GLB,, | Performed by: UROLOGY

## 2022-08-25 PROCEDURE — 99499 RISK ADDL DX/OHS AUDIT: ICD-10-PCS | Mod: S$GLB,,, | Performed by: FAMILY MEDICINE

## 2022-08-25 PROCEDURE — 3008F BODY MASS INDEX DOCD: CPT | Mod: CPTII,S$GLB,, | Performed by: FAMILY MEDICINE

## 2022-08-25 PROCEDURE — 1126F AMNT PAIN NOTED NONE PRSNT: CPT | Mod: CPTII,S$GLB,, | Performed by: FAMILY MEDICINE

## 2022-08-25 PROCEDURE — 3008F PR BODY MASS INDEX (BMI) DOCUMENTED: ICD-10-PCS | Mod: CPTII,S$GLB,, | Performed by: FAMILY MEDICINE

## 2022-08-25 PROCEDURE — 1160F RVW MEDS BY RX/DR IN RCRD: CPT | Mod: CPTII,S$GLB,, | Performed by: FAMILY MEDICINE

## 2022-08-25 PROCEDURE — 99397 PER PM REEVAL EST PAT 65+ YR: CPT | Mod: S$GLB,,, | Performed by: FAMILY MEDICINE

## 2022-08-25 PROCEDURE — 1101F PR PT FALLS ASSESS DOC 0-1 FALLS W/OUT INJ PAST YR: ICD-10-PCS | Mod: CPTII,S$GLB,, | Performed by: FAMILY MEDICINE

## 2022-08-25 PROCEDURE — 1101F PR PT FALLS ASSESS DOC 0-1 FALLS W/OUT INJ PAST YR: ICD-10-PCS | Mod: CPTII,S$GLB,, | Performed by: UROLOGY

## 2022-08-25 PROCEDURE — 1159F PR MEDICATION LIST DOCUMENTED IN MEDICAL RECORD: ICD-10-PCS | Mod: CPTII,S$GLB,, | Performed by: FAMILY MEDICINE

## 2022-08-25 PROCEDURE — 99999 PR PBB SHADOW E&M-EST. PATIENT-LVL III: CPT | Mod: PBBFAC,,, | Performed by: UROLOGY

## 2022-08-25 PROCEDURE — 1101F PT FALLS ASSESS-DOCD LE1/YR: CPT | Mod: CPTII,S$GLB,, | Performed by: UROLOGY

## 2022-08-25 PROCEDURE — 1126F AMNT PAIN NOTED NONE PRSNT: CPT | Mod: CPTII,S$GLB,, | Performed by: UROLOGY

## 2022-08-25 PROCEDURE — 1101F PT FALLS ASSESS-DOCD LE1/YR: CPT | Mod: CPTII,S$GLB,, | Performed by: FAMILY MEDICINE

## 2022-08-25 PROCEDURE — 3288F PR FALLS RISK ASSESSMENT DOCUMENTED: ICD-10-PCS | Mod: CPTII,S$GLB,, | Performed by: UROLOGY

## 2022-08-25 PROCEDURE — 3288F FALL RISK ASSESSMENT DOCD: CPT | Mod: CPTII,S$GLB,, | Performed by: FAMILY MEDICINE

## 2022-08-25 PROCEDURE — 3044F PR MOST RECENT HEMOGLOBIN A1C LEVEL <7.0%: ICD-10-PCS | Mod: CPTII,S$GLB,, | Performed by: FAMILY MEDICINE

## 2022-08-25 PROCEDURE — 99213 OFFICE O/P EST LOW 20 MIN: CPT | Mod: S$GLB,,, | Performed by: UROLOGY

## 2022-08-25 PROCEDURE — 3079F DIAST BP 80-89 MM HG: CPT | Mod: CPTII,S$GLB,, | Performed by: FAMILY MEDICINE

## 2022-08-25 PROCEDURE — 1159F PR MEDICATION LIST DOCUMENTED IN MEDICAL RECORD: ICD-10-PCS | Mod: CPTII,S$GLB,, | Performed by: UROLOGY

## 2022-08-25 PROCEDURE — 3044F HG A1C LEVEL LT 7.0%: CPT | Mod: CPTII,S$GLB,, | Performed by: FAMILY MEDICINE

## 2022-08-25 PROCEDURE — 99213 PR OFFICE/OUTPT VISIT, EST, LEVL III, 20-29 MIN: ICD-10-PCS | Mod: S$GLB,,, | Performed by: UROLOGY

## 2022-08-25 PROCEDURE — 99397 PR PREVENTIVE VISIT,EST,65 & OVER: ICD-10-PCS | Mod: S$GLB,,, | Performed by: FAMILY MEDICINE

## 2022-08-25 PROCEDURE — 1126F PR PAIN SEVERITY QUANTIFIED, NO PAIN PRESENT: ICD-10-PCS | Mod: CPTII,S$GLB,, | Performed by: FAMILY MEDICINE

## 2022-08-25 PROCEDURE — 3288F FALL RISK ASSESSMENT DOCD: CPT | Mod: CPTII,S$GLB,, | Performed by: UROLOGY

## 2022-08-25 PROCEDURE — 99999 PR PBB SHADOW E&M-EST. PATIENT-LVL III: ICD-10-PCS | Mod: PBBFAC,,, | Performed by: UROLOGY

## 2022-08-25 PROCEDURE — 99214 OFFICE O/P EST MOD 30 MIN: CPT | Mod: 25,S$GLB,, | Performed by: FAMILY MEDICINE

## 2022-08-25 PROCEDURE — 1160F PR REVIEW ALL MEDS BY PRESCRIBER/CLIN PHARMACIST DOCUMENTED: ICD-10-PCS | Mod: CPTII,S$GLB,, | Performed by: FAMILY MEDICINE

## 2022-08-25 PROCEDURE — 99214 PR OFFICE/OUTPT VISIT, EST, LEVL IV, 30-39 MIN: ICD-10-PCS | Mod: 25,S$GLB,, | Performed by: FAMILY MEDICINE

## 2022-08-25 PROCEDURE — 3075F PR MOST RECENT SYSTOLIC BLOOD PRESS GE 130-139MM HG: ICD-10-PCS | Mod: CPTII,S$GLB,, | Performed by: FAMILY MEDICINE

## 2022-08-25 PROCEDURE — 3079F PR MOST RECENT DIASTOLIC BLOOD PRESSURE 80-89 MM HG: ICD-10-PCS | Mod: CPTII,S$GLB,, | Performed by: FAMILY MEDICINE

## 2022-08-25 PROCEDURE — 3044F HG A1C LEVEL LT 7.0%: CPT | Mod: CPTII,S$GLB,, | Performed by: UROLOGY

## 2022-08-25 RX ORDER — AMLODIPINE BESYLATE 5 MG/1
5 TABLET ORAL DAILY
Qty: 90 TABLET | Refills: 1 | Status: SHIPPED | OUTPATIENT
Start: 2022-08-25 | End: 2023-08-25

## 2022-08-25 RX ORDER — ATORVASTATIN CALCIUM 10 MG/1
10 TABLET, FILM COATED ORAL DAILY
Qty: 90 TABLET | Refills: 3 | Status: SHIPPED | OUTPATIENT
Start: 2022-08-25 | End: 2023-08-25

## 2022-08-25 RX ORDER — ZOLPIDEM TARTRATE 10 MG/1
10 TABLET ORAL NIGHTLY PRN
Qty: 30 TABLET | Refills: 3 | Status: SHIPPED | OUTPATIENT
Start: 2022-08-25

## 2022-08-25 NOTE — PROGRESS NOTES
Clinic Note  8/25/2022      Subjective:         Chief Complaint:   HPI  Sha Triplett is a 72 y.o. male with prostate cancer.  [place] vet, retired from [employer]  HIFU (high intensity focused ultrasound) 7/6/2021- right theodora-ablation. PSA has declined 9.3 to 3.6.      MRI 2/22/2022- 21 ccs, HIFU (high intensity focused ultrasound) post treatment effect, 0.7 cm RAantPZ PI-RADS 3.     Doing well s/p HIFU. Had some irritative voiding symptoms and foul smelling around 8/3, treated empirically with cipro.     FH - paternal cousin, uncle, paternal grandfather  PSA - 9.3  Stage - T1C  Volume - 38 ccs  MRI - 1/24/2020- L1- right apex 0.8 cm PI-RADS 4 lesion, L2 right apex/M 1.2 cm PI-RADS 3 lesion. Negative extra prostatic extension, neurovascular bundle involvement, SVI.  Biopsy - 11/26/2018 Richie 6 right apex 1/1 5%. 1/12 cores positive.  11/25/2019- Richie 4+3 RML 1/1 5%, right middle 1/1 1%, RAL 1/1 2%, right apex 1/1 2%. Overall 4/12 cores positive.  1/4/2021 Demotte 3+4 RAL 1/1 20%, right apex 1/1 35%; Richie 6 RBL 1/1 10%, right base 1%, RML 10 %, right mid  20 %. Overall 6/12+.  CAMILLE Score - 6 high risk  NCCN - unfavorable intermediate  Nodes positive risk MSKCC-13%  Germline testing -discussed, interested  Somatic testing -discussed     8/25/2022- PSA 5.5. MRI 20 ccs, stable appearance of right theodora-ablation zone with no evidence of recurrence. Stable 0.6 cm LAPZ PI-RADS 3 lesion.  Had PSA 3.5 on 8/1/2022.  Discussed AS (active surveillance) vs re-biopsy and targeting left lesion.    Lab Results   Component Value Date    PSADIAG 5.5 (H) 08/22/2022    PSADIAG 3.3 02/22/2022    PSADIAG 3.6 08/17/2021    PSADIAG 9.3 (H) 05/04/2021    PSADIAG 6.8 (H) 11/10/2020    PSADIAG 5.7 (H) 08/19/2020    PSADIAG 6.7 (H) 06/05/2020    PSADIAG 6.3 (H) 09/04/2019    PSADIAG 4.6 (H) 06/12/2019    PSADIAG 5.4 (H) 03/07/2019      Past Medical History:   Diagnosis Date    Hepatitis C virus infection cured after antiviral  "drug therapy     s/p Rx (treated / cured ) - SVR12 - 2022 (g1a, F2-3)    History of posttraumatic stress disorder (PTSD)     [facility]  - past use of IV drugs after service    Hypertension     Prostate cancer     Thoracic spine pain 2019    Tobacco dependence      Family History   Problem Relation Age of Onset    Alzheimer's disease Mother     Macular degeneration Father     Cataracts Father     Blindness Father     Alzheimer's disease Father          35 days after pt's mother    Pulmonary fibrosis Brother     Pneumonia Brother 3    Macular degeneration Paternal Aunt     Other Paternal Uncle         possibly 1-2 with prostate CA    Other Maternal Grandmother         hx?    Other Maternal Grandfather         "diseases in his body"    Other Paternal Grandmother         hx?    Prostate cancer Paternal Grandfather         POSSIBLE (heard "rumors")    Prostate cancer Paternal Cousin 62        s/p radioactive pellets    Amblyopia Neg Hx     Diabetes Neg Hx     Glaucoma Neg Hx     Hypertension Neg Hx     Retinal detachment Neg Hx     Strabismus Neg Hx     Stroke Neg Hx     Thyroid disease Neg Hx     Colon polyps Neg Hx      Social History     Socioeconomic History    Marital status:    Tobacco Use    Smoking status: Former Smoker     Packs/day: 0.00    Smokeless tobacco: Never Used   Substance and Sexual Activity    Alcohol use: No    Drug use: Not Currently     Comment: past use of pot and IV drug use after [place]    Sexual activity: Not Currently   Social History Narrative     x 2.  Two bio kids. One adopted.   Retired [employer].  Now a non smoker.  Chris Nam .       Past Surgical History:   Procedure Laterality Date    ABLATION, PROSTATE, HIGH INTENSITY FOCUSED ULTRASOUND (HIFU)  2021    Procedure: ABLATION,PROSTATE,HIGH INTENSITY FOCUSED ULTRASOUND (HIFU);  Surgeon: David Rhodes MD;  Location: [facility] OR [address] [place] [address];  Service: Urology;;    " "COLONOSCOPY W/ BIOPSIES  3/4/15    / Dr. Ellis at [facility].    LIVER BIOPSY  1998    trus/bx 2018      trus/bx 2019       Patient Active Problem List   Diagnosis    Essential hypertension    Nocturia    Prostate cancer - s/p Prostate Bx 11/25/19 - s/p HIFU 7/6/21    Decreased ROM of intervertebral discs of thoracic spine    Thoracic spine pain    Overweight (BMI 25.0-29.9)    Other male erectile dysfunction - controlled on PRN Viagra    Refractive error    Nuclear sclerosis, bilateral    Cortical age-related cataract of both eyes    Hepatitis C virus infection - cured s/p antiviral treatment - SVR12 - 1/2022 (g1a, F2-3)     Review of Systems   Constitutional: Negative for appetite change, chills, fatigue, fever and unexpected weight change.   HENT: Negative for nosebleeds.    Respiratory: Negative for shortness of breath and wheezing.    Cardiovascular: Negative for chest pain, palpitations and leg swelling.   Gastrointestinal: Negative for abdominal distention, abdominal pain, constipation, diarrhea, nausea and vomiting.   Genitourinary: Negative for dysuria and hematuria.   Musculoskeletal: Negative for arthralgias and back pain.   Skin: Negative for pallor.   Neurological: Negative for dizziness, seizures and syncope.   Hematological: Negative for adenopathy.   Psychiatric/Behavioral: Negative for dysphoric mood.         Objective:      There were no vitals taken for this visit.  Estimated body mass index is 24.63 kg/m² as calculated from the following:    Height as of 7/13/22: 5' 10" (1.778 m).    Weight as of 7/13/22: 77.9 kg (171 lb 10.1 oz).  Physical Exam  Constitutional:       General: He is not in acute distress.     Appearance: He is well-developed. He is not diaphoretic.   HENT:      Head: Atraumatic.   Neck:      Trachea: No tracheal deviation.   Cardiovascular:      Rate and Rhythm: Normal rate.   Pulmonary:      Effort: Pulmonary effort is normal. No respiratory distress.      Breath " sounds: No wheezing.   Abdominal:      General: Bowel sounds are normal. There is no distension.      Palpations: Abdomen is soft. There is no mass.      Tenderness: There is no abdominal tenderness. There is no guarding or rebound.   Skin:     General: Skin is warm and dry.   Neurological:      Mental Status: He is alert and oriented to person, place, and time.   Psychiatric:         Behavior: Behavior normal.         Thought Content: Thought content normal.         Judgment: Judgment normal.           Assessment and Plan:           Problem List Items Addressed This Visit     Prostate cancer - s/p Prostate Bx 11/25/19 - s/p HIFU 7/6/21 - Primary          Follow up:   6 months with PSA.  Some post void dribbling. Discussed ronaldo.    David Rhodes

## 2022-08-25 NOTE — PROGRESS NOTES
"Physical Exam  /80   Pulse 76   Temp 98.3 °F (36.8 °C) (Oral)   Ht 5' 10" (1.778 m)   Wt 78.6 kg (173 lb 4.5 oz)   SpO2 98%   BMI 24.86 kg/m²      Office Visit    Patient Name: Sha Triplett    : 1950  MRN: [id number]      Assessment/Plan:  Sha Triplett is a 72 y.o. male who presents today for :    Annual physical exam  -     Ambulatory referral/consult to Nutrition Services; Future; Expected date: 2022  -previous labs reviewed and discussed with patient  -anticipatory guidance provided with age appropriate preventative services discussed, healthy diet and regular physical exercise also discussed with patient        Follow up 6mo          Additional Evaluation & Management issues:     In addition to today's Annual Physical, patient has other medical issues that need to be addressed, as well as their associated prescription management that is separate from today's Physical  - as documented separately below the Annual Physical portion of this encounter.        This note was created by combination of typed  and MModal dictation.  Transcription errors may be present.  If there are any questions, please contact me.        ----------------------------------------------------------------------------------------------------------------------      HPI:  Patient Care Team:  Jude Kee MD as PCP - General (Family Medicine)  HARRIET Vazquez MD as Consulting Physician (Urology)  Filiberto Varela OD as Consulting Physician (Optometry)  Eli Caicedo MA (Inactive) as Care Coordinator    Sha is a 72 y.o. male with      Patient Active Problem List   Diagnosis    -HTN    Hep C w/o coma, chronic    BPH - stable off of Flomax    Nocturia    -Prostate cancer - s/p Prostate Bx 19 - s/p HIFU 21    Decreased ROM of intervertebral discs of thoracic spine    Thoracic spine pain    Overweight (BMI 25.0-29.9)    -Other male erectile dysfunction - controlled on PRN Viagra    " Refractive error    Nuclear sclerosis, bilateral    Cortical age-related cataract of both eyes    -Hepatitis C virus infection - cured s/p antiviral treatment - SVR12 - 1/2022 (g1a, F2-3)    -Insomnia    -PAD (peripheral artery disease)       Sha has PMHx as noted above and presents today for:  Insomnia and Hypertension      In addition to addressing the reasons for this office visit as above, which is further discussed and addressed in the separate E&M section of this note, patient desires to discuss the results of the most recent Annual Physical bloodwork today. His last follow up with me was 2 months ago. Health maintenance-wise, he is up to date on colon cancer screening as well as prostate screening. He denies any cardiovascular or neurologic complaints today      Additional ROS      CONST: no fever, no activity change, weight stable.   EYES: no vision change.   ENT: no sore throat. No dysphagia.   CV: no CP with exertion  RESP: no SOB  GI: no N/V/diarrhea/constipation  : no urinary concerns  MSK: no new myalgias or arthralgias.   SKIN: no new rashes  NEURO: no focal deficits.   PSYCH: no new issues.   ENDOCRINE: no polyuria.               Current Medications  Medications reviewed and updated.       Current Outpatient Medications:     b complex vitamins tablet, Take 1 tablet by mouth once daily., Disp: , Rfl:     cyanocobalamin, vitamin B-12, 50 mcg tablet, Take 50 mcg by mouth once daily., Disp: , Rfl:     fish oil-omega-3 fatty acids 300-1,000 mg capsule, Take 2 g by mouth once daily., Disp: , Rfl:     milk thistle 175 mg tablet, Take 175 mg by mouth once daily., Disp: , Rfl:     multivitamin capsule, Take 1 capsule by mouth once daily., Disp: , Rfl:     PSYLLIUM SEED, WITH DEXTROSE, (CILIUM ORAL), Take by mouth., Disp: , Rfl:     tamsulosin (FLOMAX) 0.4 mg Cap, Take 1 capsule (0.4 mg total) by mouth once daily., Disp: 30 capsule, Rfl: 0    amLODIPine (NORVASC) 5 MG tablet, Take 1 tablet (5  "mg total) by mouth once daily. Followup Dr.T Kee 2023 for more refills, Disp: 90 tablet, Rfl: 1    atorvastatin (LIPITOR) 10 MG tablet, Take 1 tablet (10 mg total) by mouth once daily., Disp: 90 tablet, Rfl: 3    cetirizine (ZYRTEC) 10 MG tablet, Take 1 tablet (10 mg total) by mouth every evening. (Patient not taking: No sig reported), Disp: 60 tablet, Rfl: 2    fluticasone propionate (FLONASE) 50 mcg/actuation nasal spray, SHAKE LIQUID AND USE 1 SPRAY(50 MCG) IN EACH NOSTRIL TWICE DAILY AS NEEDED FOR RHINITIS (Patient not taking: No sig reported), Disp: 48 g, Rfl: 0    zolpidem (AMBIEN) 10 mg Tab, Take 1 tablet (10 mg total) by mouth nightly as needed (insomnia)., Disp: 30 tablet, Rfl: 3    Past Surgical History:   Procedure Laterality Date    ABLATION, PROSTATE, HIGH INTENSITY FOCUSED ULTRASOUND (HIFU)  2021    Procedure: ABLATION,PROSTATE,HIGH INTENSITY FOCUSED ULTRASOUND (HIFU);  Surgeon: David Rhodes MD;  Location: [facility] OR [address] [place] [address];  Service: Urology;;    COLONOSCOPY W/ BIOPSIES  3/4/15    / Dr. Ellis at [facility].    LIVER BIOPSY      trus/bx       trus/bx          Family History   Problem Relation Age of Onset    Alzheimer's disease Mother     Macular degeneration Father     Cataracts Father     Blindness Father     Alzheimer's disease Father          35 days after pt's mother    Pulmonary fibrosis Brother     Pneumonia Brother 3    Macular degeneration Paternal Aunt     Other Paternal Uncle         possibly 1-2 with prostate CA    Other Maternal Grandmother         hx?    Other Maternal Grandfather         "diseases in his body"    Other Paternal Grandmother         hx?    Prostate cancer Paternal Grandfather         POSSIBLE (heard "rumors")    Prostate cancer Paternal Cousin 62        s/p radioactive pellets    Amblyopia Neg Hx     Diabetes Neg Hx     Glaucoma Neg Hx     Hypertension Neg Hx     Retinal detachment Neg Hx     Strabismus Neg Hx  " "   Stroke Neg Hx     Thyroid disease Neg Hx     Colon polyps Neg Hx        Social History     Socioeconomic History    Marital status:    Tobacco Use    Smoking status: Former Smoker     Packs/day: 0.00    Smokeless tobacco: Never Used   Substance and Sexual Activity    Alcohol use: No    Drug use: Not Currently     Comment: past use of pot and IV drug use after [place]    Sexual activity: Not Currently   Social History Narrative     x 2.  Two bio kids. One adopted.   Retired [employer].  Now a non smoker.  Chris Nam .             Allergies   Review of patient's allergies indicates:   Allergen Reactions    Sinus allergy Shortness Of Breath             Review of Systems  See HPI      [unfilled]  /80   Pulse 76   Temp 98.3 °F (36.8 °C) (Oral)   Ht 5' 10" (1.778 m)   Wt 78.6 kg (173 lb 4.5 oz)   SpO2 98%   BMI 24.86 kg/m²       GEN: NAD, well developed, pleasant, well nourished  HEENT: NCAT, PERRLA, EOMI, sclera clear, anicteric, bilateral ear exam wnl, O/P clear, MMM with no lesions  NECK: normal, supple with midline trachea, no LAD, no thyromegaly  LUNGS: CTAB, no w/r/r, no increased work of breathing   HEART: RRR, normal S1 and S2, no m/r/g, no edema  ABD: s/nt/nd, NABS  SKIN: normal turgor, no rashes  PSYCH: AOx3, appropriate mood and affect  MSK: warm/well perfused, normal ROM in all extremities, no c/c/e.  NEURO: normal without focal findings, CN II-XII are grossly intact.  Sensation/strength grossly normal, gait and station normal.         Labs  Lab Results   Component Value Date    HGBA1C 5.2 08/22/2022     Lab Results   Component Value Date     08/22/2022    K 4.7 08/22/2022     08/22/2022    CO2 27 08/22/2022    BUN 16 08/22/2022    CREATININE 1.0 08/22/2022    CALCIUM 9.8 08/22/2022    ANIONGAP 7 (L) 08/22/2022    ESTGFRAFRICA >60.0 05/04/2021    ESTGFRAFRICA >60.0 05/04/2021    EGFRNONAA >60.0 05/04/2021    EGFRNONAA >60.0 05/04/2021     Lab Results " "  Component Value Date    CHOL 154 08/22/2022    CHOL 170 05/04/2021    CHOL 168 05/04/2019     Lab Results   Component Value Date    HDL 58 08/22/2022    HDL 59 05/04/2021    HDL 65 05/04/2019     Lab Results   Component Value Date    LDLCALC 79.6 08/22/2022    LDLCALC 98.2 05/04/2021    LDLCALC 91.0 05/04/2019     Lab Results   Component Value Date    TRIG 82 08/22/2022    TRIG 64 05/04/2021    TRIG 60 05/04/2019     Lab Results   Component Value Date    CHOLHDL 37.7 08/22/2022    CHOLHDL 34.7 05/04/2021    CHOLHDL 38.7 05/04/2019     Last set of blood work has been reviewed as noted above.          __________________________________________________________________________________________________________________________________      Additional Evaluation & Management issues:     In addition to today's Annual Physical, patient has other medical issues that need to be addressed, as well as their associated prescription management that is separate from today's Physical  - as documented separately below      HPI:    Sha has PMHx as noted above and presents today for:  Insomnia and Hypertension    Insomnia - previously on Ambien 5mg, which had worked well but feels that recently, he occasionally needs to take the 10mg dosage to sleep. He has been helping his  daily and sometimes feels overwhelmed with work, but denies any anxiety/depression.  He has been cutting down on caffeine intake.    HTN - patient is compliant with current medication regimen with good BP control at home - no side effects. Denies CP/SOB/leg swelling    Wt Readings from Last 4 Encounters:   08/25/22 78.6 kg (173 lb 4.5 oz)   08/25/22 78 kg (172 lb)   07/13/22 77.9 kg (171 lb 10.1 oz)   06/22/22 78.4 kg (172 lb 13.5 oz)         Hx Prostate CA - doing well s/p HIFU. Follows Urology regularly.         Additional ROS    As per HPI          Physical Exam  /80   Pulse 76   Temp 98.3 °F (36.8 °C) (Oral)   Ht 5' 10" (1.778 m)   Wt 78.6 " kg (173 lb 4.5 oz)   SpO2 98%   BMI 24.86 kg/m²       GEN: NAD, well developed, pleasant, well nourished  HEENT: NCAT, PERRLA, EOMI, sclera clear, anicteric  NECK: normal, supple with midline trachea, no LAD, no thyromegaly  LUNGS: CTAB, no w/r/r, no increased work of breathing   HEART: RRR, normal S1 and S2, no m/r/g, no edema  ABD: s/nt/nd, NABS  SKIN: normal turgor, no rashes  PSYCH: AOx3, appropriate mood and affect  MSK: warm/well perfused, normal ROM in all extremities, no c/c/e.          Assessment/Plan:  Sha Triplett is a 72 y.o. male who presents today for :      -HTN  -     amLODIPine (NORVASC) 5 MG tablet; Take 1 tablet (5 mg total) by mouth once daily. Followup Dr.T Kee FEBRUARY 2023 for more refills  Dispense: 90 tablet; Refill: 1  -PAD (peripheral artery disease)  -     atorvastatin (LIPITOR) 10 MG tablet; Take 1 tablet (10 mg total) by mouth once daily.  Dispense: 90 tablet; Refill: 3  -continue current medication regimen  -DASH diet, regular cardiovascular exercises      Prostate cancer - s/p Prostate Bx 11/25/19 - s/p HIFU 7/6/21  -doing well overall, continue Kegel exercises and f/u with Urology regularly.    -Insomnia  -     zolpidem (AMBIEN) 10 mg Tab; Take 1 tablet (10 mg total) by mouth nightly as needed (insomnia).  Dispense: 30 tablet; Refill: 3  -stable, continue current regimen PRN  -potential side effects associated with medication reviewed   with patient, which patient voices understanding.    -Other male erectile dysfunction - controlled on PRN Viagra    -Hep C w/o coma, chronic - genotype 1a - Abd U/S 7/2021 w/ mild hepatomegaly but otherwise normal - follows HEP - started Epclusa therapy 7/15/21  -LFT wnl - due for HEP follow up            Follow up 6mo

## 2022-09-02 ENCOUNTER — NUTRITION (OUTPATIENT)
Dept: NUTRITION | Facility: CLINIC | Age: 72
End: 2022-09-02
Payer: MEDICARE

## 2022-09-02 VITALS — BODY MASS INDEX: 24.08 KG/M2 | HEIGHT: 70 IN | WEIGHT: 168.19 LBS

## 2022-09-02 DIAGNOSIS — Z00.00 ANNUAL PHYSICAL EXAM: ICD-10-CM

## 2022-09-02 DIAGNOSIS — Z71.3 DIETARY COUNSELING: ICD-10-CM

## 2022-09-02 DIAGNOSIS — I10 PRIMARY HYPERTENSION: Primary | ICD-10-CM

## 2022-09-02 PROCEDURE — 99999 PR PBB SHADOW E&M-EST. PATIENT-LVL III: ICD-10-PCS | Mod: PBBFAC,,,

## 2022-09-02 PROCEDURE — 97802 PR MED NUTR THER, 1ST, INDIV, EA 15 MIN: ICD-10-PCS | Mod: S$GLB,,, | Performed by: DIETITIAN, REGISTERED

## 2022-09-02 PROCEDURE — 99999 PR PBB SHADOW E&M-EST. PATIENT-LVL III: CPT | Mod: PBBFAC,,,

## 2022-09-02 PROCEDURE — 97802 MEDICAL NUTRITION INDIV IN: CPT | Mod: S$GLB,,, | Performed by: DIETITIAN, REGISTERED

## 2022-09-02 NOTE — PROGRESS NOTES
"Referring Physician:Jude Kee MD     Reason for visit:  Chief Complaint   Patient presents with    Hypertension    Nutrition Counseling      Initial Visit    :1950     Allergies Reviewed  Meds Reviewed    Anthropometrics  Weight:76.3 kg (168 lb 3.4 oz)  Height:5' 10" (1.778 m)  BMI:Body mass index is 24.14 kg/m².   IBW: 75.5 kg  +/-10%    Meds:  Outpatient Medications Prior to Visit   Medication Sig Dispense Refill    amLODIPine (NORVASC) 5 MG tablet Take 1 tablet (5 mg total) by mouth once daily. Followup Dr.T Kee 2023 for more refills 90 tablet 1    atorvastatin (LIPITOR) 10 MG tablet Take 1 tablet (10 mg total) by mouth once daily. 90 tablet 3    b complex vitamins tablet Take 1 tablet by mouth once daily.      cetirizine (ZYRTEC) 10 MG tablet Take 1 tablet (10 mg total) by mouth every evening. (Patient not taking: No sig reported) 60 tablet 2    cyanocobalamin, vitamin B-12, 50 mcg tablet Take 50 mcg by mouth once daily.      fish oil-omega-3 fatty acids 300-1,000 mg capsule Take 2 g by mouth once daily.      fluticasone propionate (FLONASE) 50 mcg/actuation nasal spray SHAKE LIQUID AND USE 1 SPRAY(50 MCG) IN EACH NOSTRIL TWICE DAILY AS NEEDED FOR RHINITIS (Patient not taking: No sig reported) 48 g 0    milk thistle 175 mg tablet Take 175 mg by mouth once daily.      multivitamin capsule Take 1 capsule by mouth once daily.      PSYLLIUM SEED, WITH DEXTROSE, (CILIUM ORAL) Take by mouth.      tamsulosin (FLOMAX) 0.4 mg Cap Take 1 capsule (0.4 mg total) by mouth once daily. 30 capsule 0    zolpidem (AMBIEN) 10 mg Tab Take 1 tablet (10 mg total) by mouth nightly as needed (insomnia). 30 tablet 3     No facility-administered medications prior to visit.       Food/Drug Interactions Noted:  n/a    Vitamins/Supplements/Herbs:  see med list    Labs: CMP wnl     Nutrition Prescription: 2265 Kcals/day( 30 kcal/kg IBW),  75 g protein( 1.0 g/kg IBW)     Support System:   pt lives alone; does his " own grocery shopping and meal preparation.  Is a [place]    Diet Hx:  pt states he has not been sleeping well, and voiced concern with oxygen levels in his legs.  Had a list of questions to ask during our encounter:  does hydration affect sleep?; is skim milk recommended?; type of bread: eats sourdough; drinks coffee in the morning to wake up after a sleepless night; is cheese ok?; does dehydration affect sleep?.  Drinks water throughout the day, and some crangrape juice.  Asks if organic foods are best.    Current activity level and/or physical limitations:   tried some exercise but stopped when it made his blood pressure rise    Motivation to make changes/anticipated barriers and/or expected adherence:  no change in current diet regimen anticipated    Nutrition-Focus Physical Findings:  Pt wearing face covering per COVID19 protocol; pt appears well nourished      Assessment:  pt very attentive and all of his questions were answered/addressed.  He verbalized understanding of information.    Nutrition Diagnosis: none at this time    Recommendations:  heart healthy diet.    Strategies Implemented:  do not skip meals    Consultation Time:30 minutes.  Communicated with referring healthcare provider:  Consult note available in pt's Epic chart per MD discretion  Follow Up:  Pt provided with dietitian contact number and advised to call with questions or make future appointment if further intervention needed.

## 2022-09-16 ENCOUNTER — PATIENT MESSAGE (OUTPATIENT)
Dept: FAMILY MEDICINE | Facility: CLINIC | Age: 72
End: 2022-09-16
Payer: MEDICARE

## 2022-09-16 DIAGNOSIS — G47.09 OTHER INSOMNIA: Primary | ICD-10-CM

## 2022-09-16 NOTE — TELEPHONE ENCOUNTER
Other insomnia  -     Ambulatory referral/consult to Neurology; Future; Expected date: 09/23/2022

## 2022-10-04 ENCOUNTER — OFFICE VISIT (OUTPATIENT)
Dept: NEUROLOGY | Facility: CLINIC | Age: 72
End: 2022-10-04
Payer: MEDICARE

## 2022-10-04 VITALS
SYSTOLIC BLOOD PRESSURE: 157 MMHG | DIASTOLIC BLOOD PRESSURE: 82 MMHG | HEART RATE: 77 BPM | HEIGHT: 70 IN | WEIGHT: 169.44 LBS | BODY MASS INDEX: 24.26 KG/M2

## 2022-10-04 DIAGNOSIS — G47.09 OTHER INSOMNIA: ICD-10-CM

## 2022-10-04 DIAGNOSIS — F51.04 CHRONIC INSOMNIA: Primary | ICD-10-CM

## 2022-10-04 PROCEDURE — 99999 PR PBB SHADOW E&M-EST. PATIENT-LVL III: ICD-10-PCS | Mod: PBBFAC,,, | Performed by: PSYCHIATRY & NEUROLOGY

## 2022-10-04 PROCEDURE — 3077F SYST BP >= 140 MM HG: CPT | Mod: CPTII,S$GLB,, | Performed by: PSYCHIATRY & NEUROLOGY

## 2022-10-04 PROCEDURE — 3044F HG A1C LEVEL LT 7.0%: CPT | Mod: CPTII,S$GLB,, | Performed by: PSYCHIATRY & NEUROLOGY

## 2022-10-04 PROCEDURE — 3077F PR MOST RECENT SYSTOLIC BLOOD PRESSURE >= 140 MM HG: ICD-10-PCS | Mod: CPTII,S$GLB,, | Performed by: PSYCHIATRY & NEUROLOGY

## 2022-10-04 PROCEDURE — 3008F BODY MASS INDEX DOCD: CPT | Mod: CPTII,S$GLB,, | Performed by: PSYCHIATRY & NEUROLOGY

## 2022-10-04 PROCEDURE — 1101F PT FALLS ASSESS-DOCD LE1/YR: CPT | Mod: CPTII,S$GLB,, | Performed by: PSYCHIATRY & NEUROLOGY

## 2022-10-04 PROCEDURE — 3008F PR BODY MASS INDEX (BMI) DOCUMENTED: ICD-10-PCS | Mod: CPTII,S$GLB,, | Performed by: PSYCHIATRY & NEUROLOGY

## 2022-10-04 PROCEDURE — 3079F DIAST BP 80-89 MM HG: CPT | Mod: CPTII,S$GLB,, | Performed by: PSYCHIATRY & NEUROLOGY

## 2022-10-04 PROCEDURE — 3044F PR MOST RECENT HEMOGLOBIN A1C LEVEL <7.0%: ICD-10-PCS | Mod: CPTII,S$GLB,, | Performed by: PSYCHIATRY & NEUROLOGY

## 2022-10-04 PROCEDURE — 3079F PR MOST RECENT DIASTOLIC BLOOD PRESSURE 80-89 MM HG: ICD-10-PCS | Mod: CPTII,S$GLB,, | Performed by: PSYCHIATRY & NEUROLOGY

## 2022-10-04 PROCEDURE — 99204 PR OFFICE/OUTPT VISIT, NEW, LEVL IV, 45-59 MIN: ICD-10-PCS | Mod: S$GLB,,, | Performed by: PSYCHIATRY & NEUROLOGY

## 2022-10-04 PROCEDURE — 1101F PR PT FALLS ASSESS DOC 0-1 FALLS W/OUT INJ PAST YR: ICD-10-PCS | Mod: CPTII,S$GLB,, | Performed by: PSYCHIATRY & NEUROLOGY

## 2022-10-04 PROCEDURE — 3288F PR FALLS RISK ASSESSMENT DOCUMENTED: ICD-10-PCS | Mod: CPTII,S$GLB,, | Performed by: PSYCHIATRY & NEUROLOGY

## 2022-10-04 PROCEDURE — 1126F PR PAIN SEVERITY QUANTIFIED, NO PAIN PRESENT: ICD-10-PCS | Mod: CPTII,S$GLB,, | Performed by: PSYCHIATRY & NEUROLOGY

## 2022-10-04 PROCEDURE — 99999 PR PBB SHADOW E&M-EST. PATIENT-LVL III: CPT | Mod: PBBFAC,,, | Performed by: PSYCHIATRY & NEUROLOGY

## 2022-10-04 PROCEDURE — 1126F AMNT PAIN NOTED NONE PRSNT: CPT | Mod: CPTII,S$GLB,, | Performed by: PSYCHIATRY & NEUROLOGY

## 2022-10-04 PROCEDURE — 99204 OFFICE O/P NEW MOD 45 MIN: CPT | Mod: S$GLB,,, | Performed by: PSYCHIATRY & NEUROLOGY

## 2022-10-04 PROCEDURE — 1159F MED LIST DOCD IN RCRD: CPT | Mod: CPTII,S$GLB,, | Performed by: PSYCHIATRY & NEUROLOGY

## 2022-10-04 PROCEDURE — 1159F PR MEDICATION LIST DOCUMENTED IN MEDICAL RECORD: ICD-10-PCS | Mod: CPTII,S$GLB,, | Performed by: PSYCHIATRY & NEUROLOGY

## 2022-10-04 PROCEDURE — 3288F FALL RISK ASSESSMENT DOCD: CPT | Mod: CPTII,S$GLB,, | Performed by: PSYCHIATRY & NEUROLOGY

## 2022-10-04 NOTE — PROGRESS NOTES
"  Sha Triplett is a 72 y.o. year old male that  presents for evaluation of insomnia.  Chief Complaint   Patient presents with    Consult    Insomnia   .     HPI:  Mr Triplett has HTN, PAD, PTSD, prostate cancer, and tobacco dependence.  He comes in today stating that for the past 6 to 8 moths he has been having a great deal of sleep difficulty.  Said that many nights of the week he can not fall asleep and is up the whole night, but other nights he is able to sleep solidly for 9 to 10 hours.  He lives alone but admits to snoring, but has never being told that he struggles to breath when he is asleep, and sometimes feels tired in the morning.  Mr Triplett said that for the most part he remains in bed " tossing and turing "when can not falls asleep but lately is trying to organize his sleep routine including no exercising in the late afternoon and eating a light meal. Does not watch TV in bed.  Similarly, he indicated that " I had tried everything legal and illegal to try to sleep better and right now I am taking Ambien" but to no avail.  Denies HA, vertigo, double vision, imbalance, focal weakness or numbness, slurred speech, language or vision impairment.          Past Medical History:   Diagnosis Date    -Insomnia 8/25/2022    -PAD (peripheral artery disease) 8/25/2022    Hepatitis C virus infection cured after antiviral drug therapy     s/p Rx (treated / cured ) - SVR12 - 1/2022 (g1a, F2-3)    History of posttraumatic stress disorder (PTSD)     [facility] - past use of IV drugs after service    Hypertension     Prostate cancer     Thoracic spine pain 8/8/2019    Tobacco dependence      Social History     Socioeconomic History    Marital status:    Tobacco Use    Smoking status: Former     Packs/day: 0.00     Types: Cigarettes    Smokeless tobacco: Never   Substance and Sexual Activity    Alcohol use: No    Drug use: Not Currently     Comment: past use of pot and IV drug use after [place]    Sexual " "activity: Not Currently   Social History Narrative     x 2.  Two bio kids. One adopted.   Retired [employer].  Now a non smoker.  [employer] .       Past Surgical History:   Procedure Laterality Date    ABLATION, PROSTATE, HIGH INTENSITY FOCUSED ULTRASOUND (HIFU)  2021    Procedure: ABLATION,PROSTATE,HIGH INTENSITY FOCUSED ULTRASOUND (HIFU);  Surgeon: David Rhodes MD;  Location: [facility] OR [address] [place] [address];  Service: Urology;;    COLONOSCOPY W/ BIOPSIES  3/4/15    / Dr. Ellis at [facility].    LIVER BIOPSY      trus/bx       trus/bx        Family History   Problem Relation Age of Onset    Alzheimer's disease Mother     Macular degeneration Father     Cataracts Father     Blindness Father     Alzheimer's disease Father          35 days after pt's mother    Pulmonary fibrosis Brother     Pneumonia Brother 3    Macular degeneration Paternal Aunt     Other Paternal Uncle         possibly 1-2 with prostate CA    Other Maternal Grandmother         hx?    Other Maternal Grandfather         "diseases in his body"    Other Paternal Grandmother         hx?    Prostate cancer Paternal Grandfather         POSSIBLE (heard "rumors")    Prostate cancer Paternal Cousin 62        s/p radioactive pellets    Amblyopia Neg Hx     Diabetes Neg Hx     Glaucoma Neg Hx     Hypertension Neg Hx     Retinal detachment Neg Hx     Strabismus Neg Hx     Stroke Neg Hx     Thyroid disease Neg Hx     Colon polyps Neg Hx            Review of Systems  General ROS: negative for chills, fever or weight loss  Psychological ROS: negative for hallucination, depression or suicidal ideation  Ophthalmic ROS: negative for blurry vision, photophobia or eye pain  ENT ROS: negative for epistaxis, sore throat or rhinorrhea  Respiratory ROS: no cough, shortness of breath, or wheezing  Cardiovascular ROS: no chest pain or dyspnea on exertion  Gastrointestinal ROS: no abdominal pain, change in bowel habits, or black/ bloody stools  Genito-Urinary " "ROS: no dysuria, trouble voiding, or hematuria  Musculoskeletal ROS: negative for gait disturbance or muscular weakness  Neurological ROS: no syncope or seizures; no ataxia  Dermatological ROS: negative for pruritis, rash and jaundice      Physical Exam:  BP (!) 157/82   Pulse 77   Ht 5' 10" (1.778 m)   Wt 76.9 kg (169 lb 6.8 oz)   BMI 24.31 kg/m²   General appearance: alert, cooperative, no distress  Constitutional:Oriented to person, place, and time.appears well-developed and well-nourished.   HEENT: Normocephalic, atraumatic, neck symmetrical, no nasal discharge   Eyes: conjunctivae/corneas clear, PERRL, EOM's intact  Lungs: clear to auscultation bilaterally, no dullness to percussion bilaterally  Heart: regular rate and rhythm without rub; no displacement of the PMI   Abdomen: soft, non-tender; bowel sounds normoactive; no organomegaly  Extremities: extremities symmetric; no clubbing, cyanosis, or edema  Integument: Skin color, texture, turgor normal; no rashes; hair distrubution normal  Neurologic:    LABS:    Complete Blood Count  Lab Results   Component Value Date    RBC 4.15 (L) 08/22/2022    HGB 13.5 (L) 08/22/2022    HCT 42.0 08/22/2022     (H) 08/22/2022    MCH 32.5 (H) 08/22/2022    MCHC 32.1 08/22/2022    RDW 13.8 08/22/2022     08/22/2022    MPV 11.2 08/22/2022    GRAN 3.1 03/27/2018    GRAN 56.8 03/27/2018    LYMPH 1.6 03/27/2018    LYMPH 29.3 03/27/2018    MONO 0.6 03/27/2018    MONO 11.2 03/27/2018    EOS 0.1 03/27/2018    BASO 0.05 03/27/2018    EOSINOPHIL 1.6 03/27/2018    BASOPHIL 0.9 03/27/2018    DIFFMETHOD Automated 03/27/2018       Comprehensive Metabolic Panel  Lab Results   Component Value Date    GLU 75 08/22/2022    BUN 16 08/22/2022    CREATININE 1.0 08/22/2022     08/22/2022    K 4.7 08/22/2022     08/22/2022    PROT 7.3 08/22/2022    ALBUMIN 4.3 08/22/2022    BILITOT 1.5 (H) 08/22/2022    AST 20 08/22/2022    ALKPHOS 59 08/22/2022    CO2 27 08/22/2022    " ALT 17 08/22/2022    ANIONGAP 7 (L) 08/22/2022    EGFRNONAA >60.0 05/04/2021    EGFRNONAA >60.0 05/04/2021    ESTGFRAFRICA >60.0 05/04/2021    ESTGFRAFRICA >60.0 05/04/2021       TSH  No results found for: TSH      Assessment: 71 y/o with HTN, PAD, PTSD, prostate cancer, tobacco dependence, and chronic insomnia.  Normal neuro exam.          ICD-10-CM ICD-9-CM    1. Chronic insomnia  F51.04 780.52 Sleep Study Unattended/Attended      2. Other insomnia  G47.09 780.52 Ambulatory referral/consult to Neurology      Ambulatory referral/consult to Sleep Disorders        The primary encounter diagnosis was Chronic insomnia. A diagnosis of Other insomnia was also pertinent to this visit.      Plan:  1) Chronic insomnia: encouraged sleep hygiene. Referral to sleep medicine.  2) HTN  3) PAD  4) PTSD  5) Prostate cancer  6) Tobacco dependence    Orders Placed This Encounter   Procedures    Ambulatory referral/consult to Sleep Disorders    Sleep Study Unattended/Attended           John Foley MD

## 2022-10-12 ENCOUNTER — TELEPHONE (OUTPATIENT)
Dept: PULMONOLOGY | Facility: CLINIC | Age: 72
End: 2022-10-12
Payer: MEDICARE

## 2022-10-13 ENCOUNTER — TELEPHONE (OUTPATIENT)
Dept: SLEEP MEDICINE | Facility: HOSPITAL | Age: 72
End: 2022-10-13
Payer: MEDICARE

## 2022-11-15 ENCOUNTER — PES CALL (OUTPATIENT)
Dept: ADMINISTRATIVE | Facility: CLINIC | Age: 72
End: 2022-11-15
Payer: MEDICARE

## 2022-12-07 ENCOUNTER — OFFICE VISIT (OUTPATIENT)
Dept: OPTOMETRY | Facility: CLINIC | Age: 72
End: 2022-12-07
Payer: MEDICARE

## 2022-12-07 DIAGNOSIS — H25.013 CORTICAL AGE-RELATED CATARACT OF BOTH EYES: ICD-10-CM

## 2022-12-07 DIAGNOSIS — H52.7 REFRACTIVE ERROR: ICD-10-CM

## 2022-12-07 DIAGNOSIS — H25.13 NUCLEAR SCLEROSIS, BILATERAL: Primary | ICD-10-CM

## 2022-12-07 PROCEDURE — 99999 PR PBB SHADOW E&M-EST. PATIENT-LVL II: CPT | Mod: PBBFAC,,, | Performed by: OPTOMETRIST

## 2022-12-07 PROCEDURE — 1160F PR REVIEW ALL MEDS BY PRESCRIBER/CLIN PHARMACIST DOCUMENTED: ICD-10-PCS | Mod: CPTII,S$GLB,, | Performed by: OPTOMETRIST

## 2022-12-07 PROCEDURE — 92014 PR EYE EXAM, EST PATIENT,COMPREHESV: ICD-10-PCS | Mod: S$GLB,,, | Performed by: OPTOMETRIST

## 2022-12-07 PROCEDURE — 1160F RVW MEDS BY RX/DR IN RCRD: CPT | Mod: CPTII,S$GLB,, | Performed by: OPTOMETRIST

## 2022-12-07 PROCEDURE — 1159F PR MEDICATION LIST DOCUMENTED IN MEDICAL RECORD: ICD-10-PCS | Mod: CPTII,S$GLB,, | Performed by: OPTOMETRIST

## 2022-12-07 PROCEDURE — 1159F MED LIST DOCD IN RCRD: CPT | Mod: CPTII,S$GLB,, | Performed by: OPTOMETRIST

## 2022-12-07 PROCEDURE — 1101F PT FALLS ASSESS-DOCD LE1/YR: CPT | Mod: CPTII,S$GLB,, | Performed by: OPTOMETRIST

## 2022-12-07 PROCEDURE — 1126F AMNT PAIN NOTED NONE PRSNT: CPT | Mod: CPTII,S$GLB,, | Performed by: OPTOMETRIST

## 2022-12-07 PROCEDURE — 3044F HG A1C LEVEL LT 7.0%: CPT | Mod: CPTII,S$GLB,, | Performed by: OPTOMETRIST

## 2022-12-07 PROCEDURE — 92015 DETERMINE REFRACTIVE STATE: CPT | Mod: S$GLB,,, | Performed by: OPTOMETRIST

## 2022-12-07 PROCEDURE — 92015 PR REFRACTION: ICD-10-PCS | Mod: S$GLB,,, | Performed by: OPTOMETRIST

## 2022-12-07 PROCEDURE — 3288F PR FALLS RISK ASSESSMENT DOCUMENTED: ICD-10-PCS | Mod: CPTII,S$GLB,, | Performed by: OPTOMETRIST

## 2022-12-07 PROCEDURE — 3288F FALL RISK ASSESSMENT DOCD: CPT | Mod: CPTII,S$GLB,, | Performed by: OPTOMETRIST

## 2022-12-07 PROCEDURE — 1126F PR PAIN SEVERITY QUANTIFIED, NO PAIN PRESENT: ICD-10-PCS | Mod: CPTII,S$GLB,, | Performed by: OPTOMETRIST

## 2022-12-07 PROCEDURE — 1101F PR PT FALLS ASSESS DOC 0-1 FALLS W/OUT INJ PAST YR: ICD-10-PCS | Mod: CPTII,S$GLB,, | Performed by: OPTOMETRIST

## 2022-12-07 PROCEDURE — 3044F PR MOST RECENT HEMOGLOBIN A1C LEVEL <7.0%: ICD-10-PCS | Mod: CPTII,S$GLB,, | Performed by: OPTOMETRIST

## 2022-12-07 PROCEDURE — 99999 PR PBB SHADOW E&M-EST. PATIENT-LVL II: ICD-10-PCS | Mod: PBBFAC,,, | Performed by: OPTOMETRIST

## 2022-12-07 PROCEDURE — 92014 COMPRE OPH EXAM EST PT 1/>: CPT | Mod: S$GLB,,, | Performed by: OPTOMETRIST

## 2022-12-07 NOTE — PROGRESS NOTES
Subjective:       Patient ID: Sha Trpilett is a 72 y.o. male      Chief Complaint   Patient presents with    Concerns About Ocular Health     History of Present Illness  Dls: 9/16/21 Dr. Delvalle    72 y.o male presents today for hypertensive eye exam.   Pt states no changes in vision. Pt wears bifocal glasses.     No tearing  + ou off/on itching  No burning  No pain  No ha's  + ou off/on floaters  No flashes    Eye meds  Otc gtts ou prn         Assessment/Plan:     1. Nuclear sclerosis, bilateral  2. Cortical age-related cataract of both eyes  Educated pt on presence of cataracts and effects on vision. No surgery at this time. Recheck in one year, sooner PRN.    3. Refractive error  Educated patient on refractive error and discussed lens options. Dispensed updated spectacle Rx. Educated about adaptation period to new specs.    Eyeglass Final Rx       Eyeglass Final Rx         Sphere Cylinder Axis Add    Right -2.00 +1.75 180 +2.75    Left -2.25 +1.50 175 +2.75      Expiration Date: 12/7/2023                      Follow up in about 1 year (around 12/7/2023).

## 2022-12-22 ENCOUNTER — OFFICE VISIT (OUTPATIENT)
Dept: PULMONOLOGY | Facility: CLINIC | Age: 72
End: 2022-12-22
Payer: MEDICARE

## 2022-12-22 VITALS
HEIGHT: 70 IN | SYSTOLIC BLOOD PRESSURE: 118 MMHG | BODY MASS INDEX: 24.49 KG/M2 | WEIGHT: 171.06 LBS | OXYGEN SATURATION: 97 % | DIASTOLIC BLOOD PRESSURE: 69 MMHG | HEART RATE: 71 BPM

## 2022-12-22 DIAGNOSIS — F51.04 CHRONIC INSOMNIA: ICD-10-CM

## 2022-12-22 DIAGNOSIS — G47.09 OTHER INSOMNIA: Primary | ICD-10-CM

## 2022-12-22 PROCEDURE — 1125F PR PAIN SEVERITY QUANTIFIED, PAIN PRESENT: ICD-10-PCS | Mod: CPTII,S$GLB,, | Performed by: INTERNAL MEDICINE

## 2022-12-22 PROCEDURE — 99999 PR PBB SHADOW E&M-EST. PATIENT-LVL IV: ICD-10-PCS | Mod: PBBFAC,,, | Performed by: INTERNAL MEDICINE

## 2022-12-22 PROCEDURE — 3288F PR FALLS RISK ASSESSMENT DOCUMENTED: ICD-10-PCS | Mod: CPTII,S$GLB,, | Performed by: INTERNAL MEDICINE

## 2022-12-22 PROCEDURE — 1101F PT FALLS ASSESS-DOCD LE1/YR: CPT | Mod: CPTII,S$GLB,, | Performed by: INTERNAL MEDICINE

## 2022-12-22 PROCEDURE — 3044F HG A1C LEVEL LT 7.0%: CPT | Mod: CPTII,S$GLB,, | Performed by: INTERNAL MEDICINE

## 2022-12-22 PROCEDURE — 1159F MED LIST DOCD IN RCRD: CPT | Mod: CPTII,S$GLB,, | Performed by: INTERNAL MEDICINE

## 2022-12-22 PROCEDURE — 1101F PR PT FALLS ASSESS DOC 0-1 FALLS W/OUT INJ PAST YR: ICD-10-PCS | Mod: CPTII,S$GLB,, | Performed by: INTERNAL MEDICINE

## 2022-12-22 PROCEDURE — 1159F PR MEDICATION LIST DOCUMENTED IN MEDICAL RECORD: ICD-10-PCS | Mod: CPTII,S$GLB,, | Performed by: INTERNAL MEDICINE

## 2022-12-22 PROCEDURE — 1160F PR REVIEW ALL MEDS BY PRESCRIBER/CLIN PHARMACIST DOCUMENTED: ICD-10-PCS | Mod: CPTII,S$GLB,, | Performed by: INTERNAL MEDICINE

## 2022-12-22 PROCEDURE — 1160F RVW MEDS BY RX/DR IN RCRD: CPT | Mod: CPTII,S$GLB,, | Performed by: INTERNAL MEDICINE

## 2022-12-22 PROCEDURE — 3288F FALL RISK ASSESSMENT DOCD: CPT | Mod: CPTII,S$GLB,, | Performed by: INTERNAL MEDICINE

## 2022-12-22 PROCEDURE — 99999 PR PBB SHADOW E&M-EST. PATIENT-LVL IV: CPT | Mod: PBBFAC,,, | Performed by: INTERNAL MEDICINE

## 2022-12-22 PROCEDURE — 1125F AMNT PAIN NOTED PAIN PRSNT: CPT | Mod: CPTII,S$GLB,, | Performed by: INTERNAL MEDICINE

## 2022-12-22 PROCEDURE — 3078F DIAST BP <80 MM HG: CPT | Mod: CPTII,S$GLB,, | Performed by: INTERNAL MEDICINE

## 2022-12-22 PROCEDURE — 99204 PR OFFICE/OUTPT VISIT, NEW, LEVL IV, 45-59 MIN: ICD-10-PCS | Mod: S$GLB,,, | Performed by: INTERNAL MEDICINE

## 2022-12-22 PROCEDURE — 3044F PR MOST RECENT HEMOGLOBIN A1C LEVEL <7.0%: ICD-10-PCS | Mod: CPTII,S$GLB,, | Performed by: INTERNAL MEDICINE

## 2022-12-22 PROCEDURE — 3078F PR MOST RECENT DIASTOLIC BLOOD PRESSURE < 80 MM HG: ICD-10-PCS | Mod: CPTII,S$GLB,, | Performed by: INTERNAL MEDICINE

## 2022-12-22 PROCEDURE — 3074F SYST BP LT 130 MM HG: CPT | Mod: CPTII,S$GLB,, | Performed by: INTERNAL MEDICINE

## 2022-12-22 PROCEDURE — 99204 OFFICE O/P NEW MOD 45 MIN: CPT | Mod: S$GLB,,, | Performed by: INTERNAL MEDICINE

## 2022-12-22 PROCEDURE — 3008F BODY MASS INDEX DOCD: CPT | Mod: CPTII,S$GLB,, | Performed by: INTERNAL MEDICINE

## 2022-12-22 PROCEDURE — 3008F PR BODY MASS INDEX (BMI) DOCUMENTED: ICD-10-PCS | Mod: CPTII,S$GLB,, | Performed by: INTERNAL MEDICINE

## 2022-12-22 PROCEDURE — 3074F PR MOST RECENT SYSTOLIC BLOOD PRESSURE < 130 MM HG: ICD-10-PCS | Mod: CPTII,S$GLB,, | Performed by: INTERNAL MEDICINE

## 2022-12-22 NOTE — ASSESSMENT & PLAN NOTE
He finds luis alberto effective. Could try OTC CBD for sleep if interested, but to avoid hypnotics        CBT-I is considered the first line treatment for chronic insomnia.  Referred to [facility] for [facility]BT[facility]    Sedatives and hypnotics are not recommended per the American Academy of Sleep Medicine (AASM) guidelines for chronic insomnia.      Set bed time no earlier than 10 PM 7 days per week.    Set rise time no later than 7 AM 7 days per week.  Upon awakening engage in exposure to bright light.   Clock-watching should be avoided while in bed     Avoid using computer after 8 PM. No electronic devices 2 hours prior to desired bedtime.    Do not read or watch television in bed.    Exercise regularly but not in the hours before bedtime.    Avoid opioids, sedatives or other substances that decrease alertness.   Avoid caffeine, nicotine, or other stimulants within 4 hours of bedtime.   Do not take naps during the day.   Quiet sleep environment.              Control the nighttime environment with comfortable temperature, noise, and light levels.    Eat a balanced diet with regular mealtimes. Food can be disruptive right before sleep; stay away from large meals close to bedtime.   If unable to fall asleep (or back to sleep) within 15 minutes, remove yourself from bed and engage in relaxing activity until drowsy, then return to bed

## 2022-12-22 NOTE — PATIENT INSTRUCTIONS
Insomnia:    Referral to CBTi program      Set bed time no earlier than 10 PM 7 days per week.    Set rise time no later than 7 AM 7 days per week.  Upon awakening engage in exposure to bright light.   Clock-watching should be avoided while in bed     Avoid using computer after 8 PM. No electronic devices 2 hours prior to desired bedtime.    Do not read or watch television in bed.    Exercise regularly but not in the hours before bedtime.    Avoid opioids, sedatives or other substances that decrease alertness.   Avoid caffeine, nicotine, or other stimulants within 4 hours of bedtime.   Do not take naps during the day.   Quiet sleep environment.              Control the nighttime environment with comfortable temperature, noise, and light levels.    Eat a balanced diet with regular mealtimes. Food can be disruptive right before sleep; stay away from large meals close to bedtime.   If unable to fall asleep (or back to sleep) within 15 minutes, remove yourself from bed and engage in relaxing activity until drowsy, then return to bed
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
## 2023-01-24 ENCOUNTER — PATIENT MESSAGE (OUTPATIENT)
Dept: PSYCHIATRY | Facility: CLINIC | Age: 73
End: 2023-01-24
Payer: MEDICARE

## 2023-01-24 ENCOUNTER — TELEPHONE (OUTPATIENT)
Dept: PULMONOLOGY | Facility: CLINIC | Age: 73
End: 2023-01-24
Payer: MEDICARE

## 2023-01-24 NOTE — TELEPHONE ENCOUNTER
Sent patient a portal message.                ----- Message from Yoko Seth MA sent at 1/24/2023 10:24 AM CST -----  MD Yoko Maria MA  Caller: Unspecified (Yesterday,  8:26 AM)  Hi, its the CBTi program. [facility] at [phone]       CBTi -     Sharita Dodge, Ph.D.   Clinical Psychologist   [facility]   Department of Psychiatry, Section of Psychology   [address] [place] [id number]   [phone]

## 2023-01-31 ENCOUNTER — PATIENT MESSAGE (OUTPATIENT)
Dept: PSYCHIATRY | Facility: CLINIC | Age: 73
End: 2023-01-31

## 2023-01-31 ENCOUNTER — OFFICE VISIT (OUTPATIENT)
Dept: PSYCHIATRY | Facility: CLINIC | Age: 73
End: 2023-01-31
Payer: MEDICARE

## 2023-01-31 DIAGNOSIS — F51.04 CHRONIC INSOMNIA: Primary | ICD-10-CM

## 2023-01-31 PROCEDURE — 90791 PR PSYCHIATRIC DIAGNOSTIC EVALUATION: ICD-10-PCS | Mod: HCNC,95,, | Performed by: PSYCHOLOGIST

## 2023-01-31 PROCEDURE — 90791 PSYCH DIAGNOSTIC EVALUATION: CPT | Mod: HCNC,95,, | Performed by: PSYCHOLOGIST
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
## 2023-02-07 DIAGNOSIS — Z00.00 ENCOUNTER FOR MEDICARE ANNUAL WELLNESS EXAM: ICD-10-CM

## 2023-02-09 DIAGNOSIS — Z00.00 ENCOUNTER FOR MEDICARE ANNUAL WELLNESS EXAM: ICD-10-CM

## 2023-02-13 ENCOUNTER — LAB VISIT (OUTPATIENT)
Dept: LAB | Facility: HOSPITAL | Age: 73
End: 2023-02-13
Attending: UROLOGY
Payer: MEDICARE

## 2023-02-13 DIAGNOSIS — C61 PROSTATE CANCER: ICD-10-CM

## 2023-02-13 LAB — COMPLEXED PSA SERPL-MCNC: 4.4 NG/ML (ref 0–4)

## 2023-02-13 PROCEDURE — 36415 COLL VENOUS BLD VENIPUNCTURE: CPT | Mod: HCNC,PO | Performed by: UROLOGY

## 2023-02-13 PROCEDURE — 84153 ASSAY OF PSA TOTAL: CPT | Mod: HCNC | Performed by: UROLOGY

## 2023-02-16 ENCOUNTER — OFFICE VISIT (OUTPATIENT)
Dept: UROLOGY | Facility: CLINIC | Age: 73
End: 2023-02-16
Payer: MEDICARE

## 2023-02-16 VITALS
SYSTOLIC BLOOD PRESSURE: 164 MMHG | HEART RATE: 72 BPM | HEIGHT: 70 IN | DIASTOLIC BLOOD PRESSURE: 72 MMHG | BODY MASS INDEX: 24.49 KG/M2 | WEIGHT: 171.06 LBS

## 2023-02-16 DIAGNOSIS — C61 PROSTATE CANCER: Primary | ICD-10-CM

## 2023-02-16 PROCEDURE — 3077F SYST BP >= 140 MM HG: CPT | Mod: HCNC,CPTII,S$GLB, | Performed by: UROLOGY

## 2023-02-16 PROCEDURE — 1160F RVW MEDS BY RX/DR IN RCRD: CPT | Mod: HCNC,CPTII,S$GLB, | Performed by: UROLOGY

## 2023-02-16 PROCEDURE — 1126F PR PAIN SEVERITY QUANTIFIED, NO PAIN PRESENT: ICD-10-PCS | Mod: HCNC,CPTII,S$GLB, | Performed by: UROLOGY

## 2023-02-16 PROCEDURE — 3078F DIAST BP <80 MM HG: CPT | Mod: HCNC,CPTII,S$GLB, | Performed by: UROLOGY

## 2023-02-16 PROCEDURE — 99999 PR PBB SHADOW E&M-EST. PATIENT-LVL III: CPT | Mod: PBBFAC,HCNC,, | Performed by: UROLOGY

## 2023-02-16 PROCEDURE — 1159F MED LIST DOCD IN RCRD: CPT | Mod: HCNC,CPTII,S$GLB, | Performed by: UROLOGY

## 2023-02-16 PROCEDURE — 3008F PR BODY MASS INDEX (BMI) DOCUMENTED: ICD-10-PCS | Mod: HCNC,CPTII,S$GLB, | Performed by: UROLOGY

## 2023-02-16 PROCEDURE — 99214 PR OFFICE/OUTPT VISIT, EST, LEVL IV, 30-39 MIN: ICD-10-PCS | Mod: HCNC,S$GLB,, | Performed by: UROLOGY

## 2023-02-16 PROCEDURE — 3078F PR MOST RECENT DIASTOLIC BLOOD PRESSURE < 80 MM HG: ICD-10-PCS | Mod: HCNC,CPTII,S$GLB, | Performed by: UROLOGY

## 2023-02-16 PROCEDURE — 99999 PR PBB SHADOW E&M-EST. PATIENT-LVL III: ICD-10-PCS | Mod: PBBFAC,HCNC,, | Performed by: UROLOGY

## 2023-02-16 PROCEDURE — 1160F PR REVIEW ALL MEDS BY PRESCRIBER/CLIN PHARMACIST DOCUMENTED: ICD-10-PCS | Mod: HCNC,CPTII,S$GLB, | Performed by: UROLOGY

## 2023-02-16 PROCEDURE — 99214 OFFICE O/P EST MOD 30 MIN: CPT | Mod: HCNC,S$GLB,, | Performed by: UROLOGY

## 2023-02-16 PROCEDURE — 1126F AMNT PAIN NOTED NONE PRSNT: CPT | Mod: HCNC,CPTII,S$GLB, | Performed by: UROLOGY

## 2023-02-16 PROCEDURE — 3077F PR MOST RECENT SYSTOLIC BLOOD PRESSURE >= 140 MM HG: ICD-10-PCS | Mod: HCNC,CPTII,S$GLB, | Performed by: UROLOGY

## 2023-02-16 PROCEDURE — 1159F PR MEDICATION LIST DOCUMENTED IN MEDICAL RECORD: ICD-10-PCS | Mod: HCNC,CPTII,S$GLB, | Performed by: UROLOGY

## 2023-02-16 PROCEDURE — 3008F BODY MASS INDEX DOCD: CPT | Mod: HCNC,CPTII,S$GLB, | Performed by: UROLOGY

## 2023-02-16 NOTE — PROGRESS NOTES
Clinic Note  2/16/2023      Subjective:         Chief Complaint:   HPI  Sha Triplett is a 72 y.o. male with prostate cancer.  [place] vet, retired from [employer]  HIFU (high intensity focused ultrasound) 7/6/2021- right theodora-ablation. PSA has declined 9.3 to 3.6.      MRI 2/22/2022- 21 ccs, HIFU (high intensity focused ultrasound) post treatment effect, 0.7 cm RAantPZ PI-RADS 3.     Doing well s/p HIFU. Had some irritative voiding symptoms and foul smelling around 8/3, treated empirically with cipro.     FH - paternal cousin, uncle, paternal grandfather  PSA - 9.3  Stage - T1C  Volume - 38 ccs  MRI - 1/24/2020- L1- right apex 0.8 cm PI-RADS 4 lesion, L2 right apex/M 1.2 cm PI-RADS 3 lesion. Negative extra prostatic extension, neurovascular bundle involvement, SVI.  Biopsy - 11/26/2018 Richie 6 right apex 1/1 5%. 1/12 cores positive.  11/25/2019- Richie 4+3 RML 1/1 5%, right middle 1/1 1%, RAL 1/1 2%, right apex 1/1 2%. Overall 4/12 cores positive.  1/4/2021 Hobson 3+4 RAL 1/1 20%, right apex 1/1 35%; Richie 6 RBL 1/1 10%, right base 1%, RML 10 %, right mid  20 %. Overall 6/12+.  CAMILLE Score - 6 high risk  NCCN - unfavorable intermediate  Nodes positive risk MSKCC-13%  Germline testing -discussed, interested  Somatic testing -discussed     8/25/2022- PSA 5.5. MRI 20 ccs, stable appearance of right theodora-ablation zone with no evidence of recurrence. Stable 0.6 cm LAPZ PI-RADS 3 lesion.  Had PSA 3.5 on 8/1/2022.  Discussed AS (active surveillance) vs re-biopsy and targeting left lesion.      Lab Results   Component Value Date    PSADIAG 4.4 (H) 02/13/2023    PSADIAG 5.5 (H) 08/22/2022    PSADIAG 3.3 02/22/2022    PSADIAG 3.6 08/17/2021    PSADIAG 9.3 (H) 05/04/2021    PSADIAG 6.8 (H) 11/10/2020    PSADIAG 5.7 (H) 08/19/2020    PSADIAG 6.7 (H) 06/05/2020    PSADIAG 6.3 (H) 09/04/2019    PSADIAG 4.6 (H) 06/12/2019      Past Medical History:   Diagnosis Date    -Insomnia 8/25/2022    -PAD (peripheral artery  "disease) 2022    Hepatitis C virus infection cured after antiviral drug therapy     s/p Rx (treated / cured ) - SVR12 - 2022 (g1a, F2-3)    History of posttraumatic stress disorder (PTSD)     [facility]  - past use of IV drugs after service    Hypertension     Prostate cancer     Thoracic spine pain 2019    Tobacco dependence      Family History   Problem Relation Age of Onset    Alzheimer's disease Mother     Macular degeneration Father     Cataracts Father     Blindness Father     Alzheimer's disease Father          35 days after pt's mother    Pulmonary fibrosis Brother     Pneumonia Brother 3    Macular degeneration Paternal Aunt     Other Paternal Uncle         possibly 1-2 with prostate CA    Other Maternal Grandmother         hx?    Other Maternal Grandfather         "diseases in his body"    Other Paternal Grandmother         hx?    Prostate cancer Paternal Grandfather         POSSIBLE (heard "rumors")    Prostate cancer Paternal Cousin 62        s/p radioactive pellets    Amblyopia Neg Hx     Diabetes Neg Hx     Glaucoma Neg Hx     Hypertension Neg Hx     Retinal detachment Neg Hx     Strabismus Neg Hx     Stroke Neg Hx     Thyroid disease Neg Hx     Colon polyps Neg Hx      Social History     Socioeconomic History    Marital status:    Tobacco Use    Smoking status: Former     Packs/day: 0.00     Types: Cigarettes    Smokeless tobacco: Former   Substance and Sexual Activity    Alcohol use: No    Drug use: Not Currently     Comment: past use of pot and IV drug use after [place]    Sexual activity: Not Currently   Social History Narrative     x 2.  Two bio kids. One adopted.   Retired [employer].  Now a non smoker.  [employer] .       Past Surgical History:   Procedure Laterality Date    ABLATION, PROSTATE, HIGH INTENSITY FOCUSED ULTRASOUND (HIFU)  2021    Procedure: ABLATION,PROSTATE,HIGH INTENSITY FOCUSED ULTRASOUND (HIFU);  Surgeon: David Rhodes MD;  Location: " "[facility] OR [facility] FLR;  Service: Urology;;    COLONOSCOPY W/ BIOPSIES  3/4/15    / Dr. Ellis at [facility].    LIVER BIOPSY  1998    trus/bx 2018      trus/bx 2019       Patient Active Problem List   Diagnosis    -HTN    Hep C w/o coma, chronic    BPH - stable off of Flomax    Nocturia    -Prostate cancer - s/p Prostate Bx 11/25/19 - s/p HIFU 7/6/21    Decreased ROM of intervertebral discs of thoracic spine    Thoracic spine pain    Overweight (BMI 25.0-29.9)    -Other male erectile dysfunction - controlled on PRN Viagra    Refractive error    Nuclear sclerosis, bilateral    Cortical age-related cataract of both eyes    -Hepatitis C virus infection - cured s/p antiviral treatment - SVR12 - 1/2022 (g1a, F2-3)    -Insomnia    -PAD (peripheral artery disease)    Chronic insomnia     Review of Systems   Constitutional:  Negative for appetite change, chills, fatigue, fever and unexpected weight change.        Insomnia   HENT:  Negative for nosebleeds.    Respiratory:  Negative for shortness of breath and wheezing.    Cardiovascular:  Negative for chest pain, palpitations and leg swelling.   Gastrointestinal:  Negative for abdominal distention, abdominal pain, constipation, diarrhea, nausea and vomiting.   Genitourinary:  Negative for dysuria and hematuria.   Musculoskeletal:  Negative for arthralgias and back pain.   Skin:  Negative for pallor.   Neurological:  Negative for dizziness, seizures and syncope.   Hematological:  Negative for adenopathy.   Psychiatric/Behavioral:  Negative for dysphoric mood.        Objective:      There were no vitals taken for this visit.  Estimated body mass index is 24.55 kg/m² as calculated from the following:    Height as of 12/22/22: 5' 10" (1.778 m).    Weight as of 12/22/22: 77.6 kg (171 lb 1.2 oz).  Physical Exam      Assessment and Plan:           Problem List Items Addressed This Visit       -Prostate cancer - s/p Prostate Bx 11/25/19 - s/p HIFU 7/6/21 - Primary       Follow up:   6 months " with PSA.    David Rhodes

## 2023-02-28 ENCOUNTER — OFFICE VISIT (OUTPATIENT)
Dept: PSYCHIATRY | Facility: CLINIC | Age: 73
End: 2023-02-28
Payer: MEDICARE

## 2023-02-28 DIAGNOSIS — F51.04 CHRONIC INSOMNIA: Primary | ICD-10-CM

## 2023-02-28 PROCEDURE — 90834 PSYTX W PT 45 MINUTES: CPT | Mod: HCNC,95,, | Performed by: PSYCHOLOGIST

## 2023-02-28 PROCEDURE — 90834 PR PSYCHOTHERAPY W/PATIENT, 45 MIN: ICD-10-PCS | Mod: HCNC,95,, | Performed by: PSYCHOLOGIST

## 2023-02-28 NOTE — PROGRESS NOTES
Individual Psychotherapy (PhD/LCSW)    2/28/2023    Site:  Telemed      The patient location is: [place], [place]  The chief complaint leading to consultation is: insomnia    Visit type: audiovisual    Face to Face time with patient: 38  45 minutes of total time spent on the encounter, which includes face to face time and non-face to face time preparing to see the patient (eg, review of tests), Obtaining and/or reviewing separately obtained history, Documenting clinical information in the electronic or other health record, Independently interpreting results (not separately reported) and communicating results to the patient/family/caregiver, or Care coordination (not separately reported).         Each patient to whom he or she provides medical services by telemedicine is:  (1) informed of the relationship between the physician and patient and the respective role of any other health care provider with respect to management of the patient; and (2) notified that he or she may decline to receive medical services by telemedicine and may withdraw from such care at any time.    Notes:      Therapeutic Intervention: Met with patient.  Outpatient - Insight oriented psychotherapy 45 min - CPT code 86527 and Outpatient - Behavior modifying psychotherapy 45 min - CPT code 86901    Chief complaint/reason for encounter: sleep     Interval history and content of current session:   Cognitive Behavioral Therapy for Insomnia (CBT-i), Session #1   Session Focus:   Welcome and member introductions   Introduction to CBT-i   Sleep and Insomnia Basic Concepts   Sleep medications   Sleep Diary & CBT-i    GHISLAINE:  13  ?   Practice Assignments:   1) Review treatment materials as needed.   2) Complete the Sleep Diary every day.  Fill it out within two hours of getting up.     Treatment plan:  Target symptoms:  insomnia  Why chosen therapy is appropriate versus another modality: relevant to diagnosis, evidence based practice  Outcome monitoring  methods: self-report, checklist/rating scale  Therapeutic intervention type: insight oriented psychotherapy, behavior modifying psychotherapy    Risk parameters:  Patient reports no suicidal ideation  Patient reports no homicidal ideation  Patient reports no self-injurious behavior  Patient reports no violent behavior    Verbal deficits: None    Patient's response to intervention:  The patient's response to intervention is accepting.    Progress toward goals and other mental status changes:  The patient's progress toward goals is fair .    Diagnosis:     ICD-10-CM ICD-9-CM   1. Chronic insomnia  F51.04 780.52       Plan:  individual psychotherapy    Return to clinic: 1 week    Length of Service (minutes): 45

## 2023-03-07 ENCOUNTER — OFFICE VISIT (OUTPATIENT)
Dept: PSYCHIATRY | Facility: CLINIC | Age: 73
End: 2023-03-07
Payer: MEDICARE

## 2023-03-07 DIAGNOSIS — F51.04 CHRONIC INSOMNIA: Primary | ICD-10-CM

## 2023-03-07 PROCEDURE — 90834 PSYTX W PT 45 MINUTES: CPT | Mod: HCNC,95,, | Performed by: PSYCHOLOGIST

## 2023-03-07 PROCEDURE — 90834 PR PSYCHOTHERAPY W/PATIENT, 45 MIN: ICD-10-PCS | Mod: HCNC,95,, | Performed by: PSYCHOLOGIST

## 2023-03-07 NOTE — PROGRESS NOTES
Individual Psychotherapy (PhD/LCSW)    3/7/2023    Site:  Telemed      The patient location is: [place], [place]  The chief complaint leading to consultation is: insomnia    Visit type: audiovisual    Face to Face time with patient: 38  45 minutes of total time spent on the encounter, which includes face to face time and non-face to face time preparing to see the patient (eg, review of tests), Obtaining and/or reviewing separately obtained history, Documenting clinical information in the electronic or other health record, Independently interpreting results (not separately reported) and communicating results to the patient/family/caregiver, or Care coordination (not separately reported).         Each patient to whom he or she provides medical services by telemedicine is:  (1) informed of the relationship between the physician and patient and the respective role of any other health care provider with respect to management of the patient; and (2) notified that he or she may decline to receive medical services by telemedicine and may withdraw from such care at any time.    Notes:      Therapeutic Intervention: Met with patient.  Outpatient - Insight oriented psychotherapy 45 min - CPT code 60218 and Outpatient - Behavior modifying psychotherapy 45 min - CPT code 11114    Chief complaint/reason for encounter: sleep     Interval history and content of current session:   Cognitive Behavioral Therapy for Insomnia (CBT-i), Session #2   Sleep Diary completed?  Yes  # of days completed:  7     Session Focus:   Summarize and graph Sleep Diary   SE:  78%  Introduce Stimulus Control and Sleep Hygiene   Introduce Sleep Restriction   Prescribed TTB:  11pm  Prescribed TOB:  6am      Practice Assignments:   1) Review treatment materials as needed.   2) Complete the Sleep Diary every day.  Fill it out within two hours of getting up.   3) Implement Stimulus Control and Sleep Hygiene strategies   4) Implement Sleep Restriction/Compression      Treatment plan:  Target symptoms:  insomnia  Why chosen therapy is appropriate versus another modality: relevant to diagnosis, evidence based practice  Outcome monitoring methods: self-report, checklist/rating scale  Therapeutic intervention type: insight oriented psychotherapy, behavior modifying psychotherapy    Risk parameters:  Patient reports no suicidal ideation  Patient reports no homicidal ideation  Patient reports no self-injurious behavior  Patient reports no violent behavior    Verbal deficits: None    Patient's response to intervention:  The patient's response to intervention is accepting.    Progress toward goals and other mental status changes:  The patient's progress toward goals is fair .    Diagnosis:     ICD-10-CM ICD-9-CM   1. Chronic insomnia  F51.04 780.52         Plan:  individual psychotherapy    Return to clinic: 1 week    Length of Service (minutes): 45

## 2023-03-14 ENCOUNTER — OFFICE VISIT (OUTPATIENT)
Dept: PSYCHIATRY | Facility: CLINIC | Age: 73
End: 2023-03-14
Payer: MEDICARE

## 2023-03-14 DIAGNOSIS — F51.04 CHRONIC INSOMNIA: Primary | ICD-10-CM

## 2023-03-14 PROCEDURE — 90834 PR PSYCHOTHERAPY W/PATIENT, 45 MIN: ICD-10-PCS | Mod: HCNC,95,, | Performed by: PSYCHOLOGIST

## 2023-03-14 PROCEDURE — 90834 PSYTX W PT 45 MINUTES: CPT | Mod: HCNC,95,, | Performed by: PSYCHOLOGIST

## 2023-03-14 NOTE — PROGRESS NOTES
Individual Psychotherapy (PhD/LCSW)    3/14/2023    Site:  Telemed      The patient location is: [place], [place]  The chief complaint leading to consultation is: insomnia    Visit type: audiovisual    Face to Face time with patient: 38  45 minutes of total time spent on the encounter, which includes face to face time and non-face to face time preparing to see the patient (eg, review of tests), Obtaining and/or reviewing separately obtained history, Documenting clinical information in the electronic or other health record, Independently interpreting results (not separately reported) and communicating results to the patient/family/caregiver, or Care coordination (not separately reported).       Each patient to whom he or she provides medical services by telemedicine is:  (1) informed of the relationship between the physician and patient and the respective role of any other health care provider with respect to management of the patient; and (2) notified that he or she may decline to receive medical services by telemedicine and may withdraw from such care at any time.    Notes:      Therapeutic Intervention: Met with patient.  Outpatient - Insight oriented psychotherapy 45 min - CPT code 42112 and Outpatient - Behavior modifying psychotherapy 45 min - CPT code 38128    Chief complaint/reason for encounter: sleep     Interval history and content of current session:   Cognitive Behavioral Therapy for Insomnia (CBT-i), Session #3   Sleep Diary completed?  Yes  # of days completed: 5  SE: 61%  Patient states he has not been following stimulus control guidelines and stays in bed when not sleeping. He reports he could not stay awake until 11pm so he adjusted his sleep prescription on his own and started going to bed at 10:00. Stressed the importance of following CBT-I guidelines for best results.     Session Focus:   Summarize and graph Sleep Diary   Discuss Sleep Restriction/Compression   Prescribed TTB:  10:30  Prescribed TOB:   5:00  Review progress with Stimulus Control and Sleep Hygiene   Introduce Relaxation Training   ?   Practice Assignments:   1) Review treatment materials as needed.   2) Complete the Sleep Diary every day.  Fill it out within two hours of getting up.   3) Implement Stimulus Control and Sleep Hygiene strategies   4) Implement Sleep Restriction/Compression   5) Practice Relaxation Training at least 20 minutes per day       Treatment plan:  Target symptoms:  insomnia  Why chosen therapy is appropriate versus another modality: relevant to diagnosis, evidence based practice  Outcome monitoring methods: self-report, checklist/rating scale  Therapeutic intervention type: insight oriented psychotherapy, behavior modifying psychotherapy    Risk parameters:  Patient reports no suicidal ideation  Patient reports no homicidal ideation  Patient reports no self-injurious behavior  Patient reports no violent behavior    Verbal deficits: None    Patient's response to intervention:  The patient's response to intervention is accepting.    Progress toward goals and other mental status changes:  The patient's progress toward goals is fair .    Diagnosis:     ICD-10-CM ICD-9-CM   1. Chronic insomnia  F51.04 780.52       Plan:  individual psychotherapy    Return to clinic: 1 week    Length of Service (minutes): 45

## 2023-03-21 ENCOUNTER — OFFICE VISIT (OUTPATIENT)
Dept: PSYCHIATRY | Facility: CLINIC | Age: 73
End: 2023-03-21
Payer: MEDICARE

## 2023-03-21 DIAGNOSIS — F51.04 CHRONIC INSOMNIA: Primary | ICD-10-CM

## 2023-03-21 PROCEDURE — 90834 PSYTX W PT 45 MINUTES: CPT | Mod: HCNC,95,, | Performed by: PSYCHOLOGIST

## 2023-03-21 PROCEDURE — 90834 PR PSYCHOTHERAPY W/PATIENT, 45 MIN: ICD-10-PCS | Mod: HCNC,95,, | Performed by: PSYCHOLOGIST

## 2023-03-21 NOTE — PROGRESS NOTES
Individual Psychotherapy (PhD/LCSW)    3/21/2023    Site:  Telemed      The patient location is: [place], [place]  The chief complaint leading to consultation is: insomnia    Visit type: audiovisual    Face to Face time with patient: 38  45 minutes of total time spent on the encounter, which includes face to face time and non-face to face time preparing to see the patient (eg, review of tests), Obtaining and/or reviewing separately obtained history, Documenting clinical information in the electronic or other health record, Independently interpreting results (not separately reported) and communicating results to the patient/family/caregiver, or Care coordination (not separately reported).       Each patient to whom he or she provides medical services by telemedicine is:  (1) informed of the relationship between the physician and patient and the respective role of any other health care provider with respect to management of the patient; and (2) notified that he or she may decline to receive medical services by telemedicine and may withdraw from such care at any time.    Notes:      Therapeutic Intervention: Met with patient.  Outpatient - Insight oriented psychotherapy 45 min - CPT code 59241 and Outpatient - Behavior modifying psychotherapy 45 min - CPT code 75829    Chief complaint/reason for encounter: sleep     Interval history and content of current session:   Cognitive Behavioral Therapy for Insomnia (CBT-i), Session #4  Sleep Diary completed?  Yes  # of days completed: 7  SE: 64%  - Patient states he may be greatly underestimating the amount of time he is sleeping, because the numbers do not match his overall experience. He states he feels like with the exception of one night last week, he has been falling asleep and staying asleep through the night.  Session Focus:  Summarize and graph Sleep Diary  Discuss Sleep Restriction/Compression  Prescribed TTB: 11:00  Prescribed TOB: 5:00  Review progress with Stimulus  Control and Sleep Hygiene  Review progress with Relaxation Training  Review Cognitive Restructuring     Practice Assignment:  1) Review treatment materials as needed.  2) Complete the Sleep Diary every day.  Fill it out within two hours of getting up.  3) Implement Stimulus Control and Sleep Hygiene strategies  4) Implement Sleep Restriction/Compression  5) Practice Relaxation Training at least 20 minutes per day  6) Practice Cognitive Restructuring      Treatment plan:  Target symptoms:  insomnia  Why chosen therapy is appropriate versus another modality: relevant to diagnosis, evidence based practice  Outcome monitoring methods: self-report, checklist/rating scale  Therapeutic intervention type: insight oriented psychotherapy, behavior modifying psychotherapy    Risk parameters:  Patient reports no suicidal ideation  Patient reports no homicidal ideation  Patient reports no self-injurious behavior  Patient reports no violent behavior    Verbal deficits: None    Patient's response to intervention:  The patient's response to intervention is accepting.    Progress toward goals and other mental status changes:  The patient's progress toward goals is fair .    Diagnosis:     ICD-10-CM ICD-9-CM   1. Chronic insomnia  F51.04 780.52         Plan:  individual psychotherapy    Return to clinic: 1 week    Length of Service (minutes): 45

## 2023-03-27 ENCOUNTER — OFFICE VISIT (OUTPATIENT)
Dept: PSYCHIATRY | Facility: CLINIC | Age: 73
End: 2023-03-27
Payer: MEDICARE

## 2023-03-27 DIAGNOSIS — F51.04 CHRONIC INSOMNIA: Primary | ICD-10-CM

## 2023-03-27 PROCEDURE — 90834 PSYTX W PT 45 MINUTES: CPT | Mod: HCNC,95,, | Performed by: PSYCHOLOGIST

## 2023-03-27 PROCEDURE — 90834 PR PSYCHOTHERAPY W/PATIENT, 45 MIN: ICD-10-PCS | Mod: HCNC,95,, | Performed by: PSYCHOLOGIST

## 2023-03-27 NOTE — PROGRESS NOTES
Individual Psychotherapy (PhD/LCSW)    3/27/2023    Site:  Telemed      The patient location is: [place], [place]  The chief complaint leading to consultation is: insomnia    Visit type: audiovisual    Face to Face time with patient: 38  45 minutes of total time spent on the encounter, which includes face to face time and non-face to face time preparing to see the patient (eg, review of tests), Obtaining and/or reviewing separately obtained history, Documenting clinical information in the electronic or other health record, Independently interpreting results (not separately reported) and communicating results to the patient/family/caregiver, or Care coordination (not separately reported).       Each patient to whom he or she provides medical services by telemedicine is:  (1) informed of the relationship between the physician and patient and the respective role of any other health care provider with respect to management of the patient; and (2) notified that he or she may decline to receive medical services by telemedicine and may withdraw from such care at any time.    Notes:      Therapeutic Intervention: Met with patient.  Outpatient - Insight oriented psychotherapy 45 min - CPT code 47202 and Outpatient - Behavior modifying psychotherapy 45 min - CPT code 97253    Chief complaint/reason for encounter: sleep     Interval history and content of current session:   Cognitive Behavioral Therapy for Insomnia (CBT-i), Session #   Sleep Diary completed?  Yes  # of days completed:  6   SE: 49%  - patient admitted to not getting out of bed when not sleep within 15 minutes. He stated he feels too drained to force himself out of bed. Discussed that this is necessary to achieve results. Patient expressed understanding and stated he will try to do better.     Session Focus:   Summarize and graph Sleep Diary   Discuss Sleep Restriction/Compression   Review progress with Stimulus Control and Sleep Hygiene   Review progress with  Relaxation Training   Review Cognitive Restructuring   Discuss Relapse Prevention   GHISLAINE:   11     Plan:   1) Review and complete treatment materials as needed.   2) Contact Dr. Davis or other provider for booster sessions if needed.   3) Treatment termination.  Follow-up with referring provider.         Treatment plan:  Target symptoms:  insomnia  Why chosen therapy is appropriate versus another modality: relevant to diagnosis, evidence based practice  Outcome monitoring methods: self-report, checklist/rating scale  Therapeutic intervention type: insight oriented psychotherapy, behavior modifying psychotherapy    Risk parameters:  Patient reports no suicidal ideation  Patient reports no homicidal ideation  Patient reports no self-injurious behavior  Patient reports no violent behavior    Verbal deficits: None    Patient's response to intervention:  The patient's response to intervention is accepting.    Progress toward goals and other mental status changes:  The patient's progress toward goals is fair .    Diagnosis:     ICD-10-CM ICD-9-CM   1. Chronic insomnia  F51.04 780.52       Plan:  individual psychotherapy - patient completed CBT-I with minimal progress due to not adhering to stimulus control guidelines. Patient will f/u with referring provider as needed.    Return to clinic: as needed    Length of Service (minutes): 45

## 2023-04-26 ENCOUNTER — PES CALL (OUTPATIENT)
Dept: ADMINISTRATIVE | Facility: CLINIC | Age: 73
End: 2023-04-26
Payer: MEDICARE

## 2023-04-28 ENCOUNTER — TELEPHONE (OUTPATIENT)
Dept: ADMINISTRATIVE | Facility: CLINIC | Age: 73
End: 2023-04-28
Payer: MEDICARE

## 2023-04-28 ENCOUNTER — OFFICE VISIT (OUTPATIENT)
Dept: INTERNAL MEDICINE | Facility: CLINIC | Age: 73
End: 2023-04-28
Payer: MEDICARE

## 2023-04-28 VITALS
BODY MASS INDEX: 24.34 KG/M2 | HEIGHT: 70 IN | DIASTOLIC BLOOD PRESSURE: 74 MMHG | HEART RATE: 64 BPM | WEIGHT: 170 LBS | SYSTOLIC BLOOD PRESSURE: 124 MMHG

## 2023-04-28 DIAGNOSIS — Z00.00 ENCOUNTER FOR MEDICARE ANNUAL WELLNESS EXAM: Primary | ICD-10-CM

## 2023-04-28 DIAGNOSIS — I10 ESSENTIAL HYPERTENSION: ICD-10-CM

## 2023-04-28 DIAGNOSIS — N13.8 BPH WITH OBSTRUCTION/LOWER URINARY TRACT SYMPTOMS: ICD-10-CM

## 2023-04-28 DIAGNOSIS — I73.9 PAD (PERIPHERAL ARTERY DISEASE): ICD-10-CM

## 2023-04-28 DIAGNOSIS — N52.8 OTHER MALE ERECTILE DYSFUNCTION: ICD-10-CM

## 2023-04-28 DIAGNOSIS — M54.6 THORACIC SPINE PAIN: ICD-10-CM

## 2023-04-28 DIAGNOSIS — C61 PROSTATE CANCER: ICD-10-CM

## 2023-04-28 DIAGNOSIS — F51.04 CHRONIC INSOMNIA: ICD-10-CM

## 2023-04-28 DIAGNOSIS — B18.2 HEP C W/O COMA, CHRONIC: ICD-10-CM

## 2023-04-28 DIAGNOSIS — N40.1 BPH WITH OBSTRUCTION/LOWER URINARY TRACT SYMPTOMS: ICD-10-CM

## 2023-04-28 PROCEDURE — 1159F PR MEDICATION LIST DOCUMENTED IN MEDICAL RECORD: ICD-10-PCS | Mod: HCNC,CPTII,95, | Performed by: NURSE PRACTITIONER

## 2023-04-28 PROCEDURE — 1160F RVW MEDS BY RX/DR IN RCRD: CPT | Mod: HCNC,CPTII,95, | Performed by: NURSE PRACTITIONER

## 2023-04-28 PROCEDURE — 1159F MED LIST DOCD IN RCRD: CPT | Mod: HCNC,CPTII,95, | Performed by: NURSE PRACTITIONER

## 2023-04-28 PROCEDURE — 1170F FXNL STATUS ASSESSED: CPT | Mod: HCNC,CPTII,95, | Performed by: NURSE PRACTITIONER

## 2023-04-28 PROCEDURE — 1101F PR PT FALLS ASSESS DOC 0-1 FALLS W/OUT INJ PAST YR: ICD-10-PCS | Mod: HCNC,CPTII,95, | Performed by: NURSE PRACTITIONER

## 2023-04-28 PROCEDURE — 3288F PR FALLS RISK ASSESSMENT DOCUMENTED: ICD-10-PCS | Mod: HCNC,CPTII,95, | Performed by: NURSE PRACTITIONER

## 2023-04-28 PROCEDURE — 1126F AMNT PAIN NOTED NONE PRSNT: CPT | Mod: HCNC,CPTII,95, | Performed by: NURSE PRACTITIONER

## 2023-04-28 PROCEDURE — 3074F SYST BP LT 130 MM HG: CPT | Mod: HCNC,CPTII,95, | Performed by: NURSE PRACTITIONER

## 2023-04-28 PROCEDURE — 3078F DIAST BP <80 MM HG: CPT | Mod: HCNC,CPTII,95, | Performed by: NURSE PRACTITIONER

## 2023-04-28 PROCEDURE — G0439 PR MEDICARE ANNUAL WELLNESS SUBSEQUENT VISIT: ICD-10-PCS | Mod: HCNC,95,, | Performed by: NURSE PRACTITIONER

## 2023-04-28 PROCEDURE — 3288F FALL RISK ASSESSMENT DOCD: CPT | Mod: HCNC,CPTII,95, | Performed by: NURSE PRACTITIONER

## 2023-04-28 PROCEDURE — 1160F PR REVIEW ALL MEDS BY PRESCRIBER/CLIN PHARMACIST DOCUMENTED: ICD-10-PCS | Mod: HCNC,CPTII,95, | Performed by: NURSE PRACTITIONER

## 2023-04-28 PROCEDURE — 3078F PR MOST RECENT DIASTOLIC BLOOD PRESSURE < 80 MM HG: ICD-10-PCS | Mod: HCNC,CPTII,95, | Performed by: NURSE PRACTITIONER

## 2023-04-28 PROCEDURE — 1101F PT FALLS ASSESS-DOCD LE1/YR: CPT | Mod: HCNC,CPTII,95, | Performed by: NURSE PRACTITIONER

## 2023-04-28 PROCEDURE — 3008F BODY MASS INDEX DOCD: CPT | Mod: HCNC,CPTII,95, | Performed by: NURSE PRACTITIONER

## 2023-04-28 PROCEDURE — 1170F PR FUNCTIONAL STATUS ASSESSED: ICD-10-PCS | Mod: HCNC,CPTII,95, | Performed by: NURSE PRACTITIONER

## 2023-04-28 PROCEDURE — 1126F PR PAIN SEVERITY QUANTIFIED, NO PAIN PRESENT: ICD-10-PCS | Mod: HCNC,CPTII,95, | Performed by: NURSE PRACTITIONER

## 2023-04-28 PROCEDURE — 3008F PR BODY MASS INDEX (BMI) DOCUMENTED: ICD-10-PCS | Mod: HCNC,CPTII,95, | Performed by: NURSE PRACTITIONER

## 2023-04-28 PROCEDURE — G0439 PPPS, SUBSEQ VISIT: HCPCS | Mod: HCNC,95,, | Performed by: NURSE PRACTITIONER

## 2023-04-28 PROCEDURE — 3074F PR MOST RECENT SYSTOLIC BLOOD PRESSURE < 130 MM HG: ICD-10-PCS | Mod: HCNC,CPTII,95, | Performed by: NURSE PRACTITIONER

## 2023-04-28 RX ORDER — TRAZODONE HYDROCHLORIDE 50 MG/1
TABLET ORAL
COMMUNITY
Start: 2023-01-22

## 2023-04-28 RX ORDER — HYDROCODONE BITARTRATE AND ACETAMINOPHEN 7.5; 325 MG/1; MG/1
TABLET ORAL
COMMUNITY
Start: 2023-03-23

## 2023-04-28 NOTE — PROGRESS NOTES
"The patient location is: [place]  The chief complaint leading to consultation is: Medicare eAWV.     Visit type: audiovisual    Face to Face time with patient: 54 minutes  65 minutes of total time spent on the encounter, which includes face to face time and non-face to face time preparing to see the patient (eg, review of tests), Obtaining and/or reviewing separately obtained history, Documenting clinical information in the electronic or other health record, Independently interpreting results (not separately reported) and communicating results to the patient/family/caregiver, or Care coordination (not separately reported).         Each patient to whom he or she provides medical services by telemedicine is:  (1) informed of the relationship between the physician and patient and the respective role of any other health care provider with respect to management of the patient; and (2) notified that he or she may decline to receive medical services by telemedicine and may withdraw from such care at any time.    Notes:       Sha Triplett presented for a follow-up Medicare AWV today. The following components were reviewed and updated:    Medical history  Family History  Social history  Allergies and Current Medications  Health Risk Assessment  Health Maintenance  Care Team    **See Completed Assessments for Annual Wellness visit with in the encounter summary    The following assessments were completed:  Depression Screening  Cognitive function Screening    Vitals:    04/28/23 1128 04/28/23 1129 04/28/23 1130   BP: 121/69 124/74    Pulse:   64   Weight: 77.1 kg (170 lb)     Height: 5' 10" (1.778 m)       Body mass index is 24.39 kg/m².   ]    Physical Exam  Constitutional:       Appearance: Normal appearance. He is normal weight.   Pulmonary:      Effort: Pulmonary effort is normal.   Musculoskeletal:         General: Normal range of motion.      Cervical back: Normal range of motion.   Neurological:      General: No " focal deficit present.      Mental Status: He is alert and oriented to person, place, and time.   Psychiatric:         Mood and Affect: Mood normal.         Behavior: Behavior normal.         Thought Content: Thought content normal.         Judgment: Judgment normal.         Diagnoses and health risks identified today and associated recommendations/orders:  1. Encounter for Medicare annual wellness exam  Reviewed and discussed preventive health screenings and vaccinations with the patient.   Advised patient to schedule appointment with PCP for follow up.     2. -Prostate cancer - s/p Prostate Bx 11/25/19 - s/p HIFU 7/6/21  Monitored and managed by Urology. He is S/P HIFU 7/6/21 - right theodora ablation. PSA as of 2/2023 4.4. Continue current treatment plan as previously prescribed with your Urologist.     3. Hep C w/o coma, chronic  Previously followed by Hepatology; last appointment in 2/2021. Treated with Epclusa x 12 weeks. LFTs normal 8/2022. Continue current treatment plan as previously prescribed with your PCP    Lab Results   Component Value Date    ALT 17 08/22/2022    AST 20 08/22/2022    ALKPHOS 59 08/22/2022    BILITOT 1.5 (H) 08/22/2022     4. -PAD (peripheral artery disease)  Denies claudication/symptoms associated with PAD. He is not taking Lipitor or antiplatelet therapy. Continue current treatment plan as previously prescribed with your PCP and follow up with them for further recommendations.     5. -HTN  Not on amlodipine; states his blood pressure has been normal on home monitor. Follow up with PCP for further evaluation and recommendations.     6. Chronic insomnia  Uncontrolled. States he is trying to improve his sleep hygiene and using Ambien and Trazodone PRN; monitored and managed by Psychiatrist.     7. BPH - stable off of Flomax  Monitored and managed by Urology. Stable. Continue current treatment plan as previously prescribed with your Urologist.     8. -Other male erectile dysfunction -  controlled on PRN Viagra  Stable. Continue current treatment plan as previously prescribed with your Urologist.     9. Thoracic spine pain  He is taking Norco PRN; states he uses it very sparingly when he has pain. Continue current treatment plan as previously prescribed with your PCP.     Opioid screening: He has a short-term supply of Norco 7.5 mg that he states he uses as needed for pain. He has not been on this medication chronically.     Substance abuse screening: He states he uses Marijuana intermittently to help with sleep. Patient advised against this.     Provided Sha with a 5-10 year written screening schedule and personal prevention plan. Recommendations were developed using the USPSTF age appropriate recommendations. Education, counseling, and referrals were provided as needed.  After Visit Summary printed and given to patient which includes a list of additional screenings\tests needed.    Follow up in about 1 year (around 4/28/2024).      Jacqueline Zuñiga, [place]    I offered to discuss advanced care planning, including how to pick a person who would make decisions for you if you were unable to make them for yourself, called a health care power of , and what kind of decisions you might make such as use of life sustaining treatments such as ventilators and tube feeding when faced with a life limiting illness recorded on a living will that they will need to know. (How you want to be cared for as you near the end of your natural life)     X  Patient is unwilling to engage in a discussion regarding advance directives at this time.

## 2023-04-28 NOTE — PATIENT INSTRUCTIONS
Counseling and Referral of Other Preventative  (Italic type indicates deductible and co-insurance are waived)    Patient Name: hSa Triplett  Today's Date: 4/28/2023    Health Maintenance       Date Due Completion Date    COVID-19 Vaccine (1) Never done ---    Colorectal Cancer Screening 03/03/2025 3/3/2015    Lipid Panel 08/22/2027 8/22/2022    Override on 3/24/2014: Done ([facility] - scanned file)    TETANUS VACCINE 07/22/2029 7/22/2019    Override on 6/9/2016: Declined        No orders of the defined types were placed in this encounter.      The following information is provided to all patients.  This information is to help you find resources for any of the problems found today that may be affecting your health:                Living healthy guide: www.[facility].[place].[facility]      Understanding Diabetes: www.diabetes.org      Eating healthy: www.cdc.gov/healthyweight      [facility] home safety checklist: www.cdc.gov/steadi/patient.html      Agency on Aging: www.goea.[place].[facility]      Alcoholics anonymous (AA): www.aa.org      Physical Activity: www.dipti.nih.gov/ew5bmpx      Tobacco use: www.quitwithusla.org     Counseling and Referral of Other Preventative  (Italic type indicates deductible and co-insurance are waived)    Patient Name: Sha Triplett  Today's Date: 4/28/2023    Health Maintenance       Date Due Completion Date    COVID-19 Vaccine (1) Never done ---    Colorectal Cancer Screening 03/03/2025 3/3/2015    Lipid Panel 08/22/2027 8/22/2022    Override on 3/24/2014: Done ([facility] - scanned file)    TETANUS VACCINE 07/22/2029 7/22/2019    Override on 6/9/2016: Declined        No orders of the defined types were placed in this encounter.      The following information is provided to all patients.  This information is to help you find resources for any of the problems found today that may be affecting your health:                Living healthy guide: www.[facility].[place]      Understanding Diabetes: www.diabetes.org       Eating healthy: www.cdc.gov/healthyweight      [facility] home safety checklist: www.cdc.gov/steadi/patient.html      Agency on Aging: www.goea.[place].gov      Alcoholics anonymous (AA): www.aa.org      Physical Activity: www.dipti.nih.gov/ab9dvvb      Tobacco use: www.quitwithusla.org

## 2023-04-28 NOTE — TELEPHONE ENCOUNTER
Called pt; informed pt I was just making a reminder call for pt's virtual visit today at 11:30am and to see if pt needed any help; pt stated didn't need any help and would complete the e-pre check soon; pt informed to login 15 minutes prior to appt time

## 2023-08-16 ENCOUNTER — LAB VISIT (OUTPATIENT)
Dept: LAB | Facility: HOSPITAL | Age: 73
End: 2023-08-16
Attending: UROLOGY
Payer: MEDICARE

## 2023-08-16 DIAGNOSIS — C61 PROSTATE CANCER: ICD-10-CM

## 2023-08-16 LAB — COMPLEXED PSA SERPL-MCNC: 5 NG/ML (ref 0–4)

## 2023-08-16 PROCEDURE — 84153 ASSAY OF PSA TOTAL: CPT | Mod: HCNC | Performed by: UROLOGY

## 2023-08-16 PROCEDURE — 36415 COLL VENOUS BLD VENIPUNCTURE: CPT | Mod: HCNC,PO | Performed by: UROLOGY

## 2023-08-16 NOTE — PROGRESS NOTES
Clinic Note  8/16/2023      Subjective:         Chief Complaint:   HPI  Sha Triplett is a 73 y.o. male with prostate cancer.  [place] vet, retired from [employer]  HIFU (high intensity focused ultrasound) 7/6/2021- right theodora-ablation. PSA has declined 9.3 to 3.6.      MRI 2/22/2022- 21 ccs, HIFU (high intensity focused ultrasound) post treatment effect, 0.7 cm RAantPZ PI-RADS 3.     Doing well s/p HIFU. Had some irritative voiding symptoms and foul smelling around 8/3, treated empirically with cipro.     FH - paternal cousin, uncle, paternal grandfather  PSA - 9.3  Stage - T1C  Volume - 38 ccs  MRI - 1/24/2020- L1- right apex 0.8 cm PI-RADS 4 lesion, L2 right apex/M 1.2 cm PI-RADS 3 lesion. Negative extra prostatic extension, neurovascular bundle involvement, SVI.  Biopsy - 11/26/2018 [place] 6 right apex 1/1 5%. 1/12 cores positive.  11/25/2019- Richie 4+3 RML 1/1 5%, right middle 1/1 1%, RAL 1/1 2%, right apex 1/1 2%. Overall 4/12 cores positive.  1/4/2021 [place] 3+4 RAL 1/1 20%, right apex 1/1 35%; Richie 6 RBL 1/1 10%, right base 1%, RML 10 %, right mid  20 %. Overall 6/12+.  CAMILLE Score - 6 high risk  NCCN - unfavorable intermediate  Nodes positive risk MSKCC-13%  Germline testing -discussed, interested  Somatic testing -discussed     8/25/2022- PSA 5.5. MRI 20 ccs, stable appearance of right theodora-ablation zone with no evidence of recurrence. Stable 0.6 cm LAPZ PI-RADS 3 lesion.  Had PSA 3.5 on 8/1/2022.  Discussed AS (active surveillance) vs re-biopsy and targeting left lesion.     8/17/2023- PSA 5.0.  LUTS- nocturia 1x/noc, decreased force of stream, empties completely.  Discussed  surveillance, PSA, MRI, re-biopsy.    Lab Results   Component Value Date    PSADIAG 5.0 (H) 08/16/2023    PSADIAG 4.4 (H) 02/13/2023    PSADIAG 5.5 (H) 08/22/2022    PSADIAG 3.3 02/22/2022    PSADIAG 3.6 08/17/2021    PSADIAG 9.3 (H) 05/04/2021    PSADIAG 6.8 (H) 11/10/2020    PSADIAG 5.7 (H) 08/19/2020    PSADIAG 6.7 (H)  "2020    PSADIAG 6.3 (H) 2019         Past Medical History:   Diagnosis Date    -Insomnia 2022    -PAD (peripheral artery disease) 2022    Hepatitis C virus infection cured after antiviral drug therapy     s/p Rx (treated / cured ) - SVR12 - 2022 (g1a, F2-3)    History of posttraumatic stress disorder (PTSD)     [employer] [employer] [facility] - past use of IV drugs after service    Hypertension     Prostate cancer     Thoracic spine pain 2019    Tobacco dependence      Family History   Problem Relation Age of Onset    Alzheimer's disease Mother     Macular degeneration Father     Cataracts Father     Blindness Father     Alzheimer's disease Father          35 days after pt's mother    Pulmonary fibrosis Brother     COPD Brother     Pneumonia Brother 3    Macular degeneration Paternal Aunt     Other Paternal Uncle         possibly 1-2 with prostate CA    Other Maternal Grandmother         hx?    Other Maternal Grandfather         "diseases in his body"    Other Paternal Grandmother         hx?    Prostate cancer Paternal Grandfather         POSSIBLE (heard "rumors")    Prostate cancer Paternal Cousin 62        s/p radioactive pellets    Amblyopia Neg Hx     Diabetes Neg Hx     Glaucoma Neg Hx     Hypertension Neg Hx     Retinal detachment Neg Hx     Strabismus Neg Hx     Stroke Neg Hx     Thyroid disease Neg Hx     Colon polyps Neg Hx      Social History     Socioeconomic History    Marital status:    Tobacco Use    Smoking status: Former     Current packs/day: 0.50     Average packs/day: 0.5 packs/day for 20.0 years (10.0 ttl pk-yrs)     Types: Cigarettes    Smokeless tobacco: Former    Tobacco comments:     Quit smoking about 10 years ago    Substance and Sexual Activity    Alcohol use: No    Drug use: Yes     Types: Marijuana     Comment: past use of pot and IV drug use after [place]    Sexual activity: Not Currently   Social History Narrative     x 2.  Two bio kids. One adopted.   " Retired [employer].  Now a non smoker.  [place] .       Social Determinants of Health     Financial Resource Strain: Low Risk  (4/28/2023)    Overall Financial Resource Strain (CARDIA)     Difficulty of Paying Living Expenses: Not hard at all   Food Insecurity: No Food Insecurity (4/28/2023)    Hunger Vital Sign     Worried About Running Out of Food in the Last Year: Never true     Ran Out of Food in the Last Year: Never true   Transportation Needs: No Transportation Needs (4/28/2023)    PRAPARE - Transportation     Lack of Transportation (Medical): No     Lack of Transportation (Non-Medical): No   Physical Activity: Inactive (4/28/2023)    Exercise Vital Sign     Days of Exercise per Week: 0 days     Minutes of Exercise per Session: 0 min   Stress: Stress Concern Present (4/28/2023)    [place] [employer] - Occupational Stress Questionnaire     Feeling of Stress : Very much   Social Connections: Moderately Isolated (4/28/2023)    Social Connection and Isolation Panel [NHANES]     Frequency of Communication with Friends and Family: Three times a week     Frequency of Social Gatherings with Friends and Family: Three times a week     Attends [place] Services: More than 4 times per year     Active Member of Clubs or Organizations: No     Attends Club or Organization Meetings: Never     Marital Status:    Housing Stability: Low Risk  (4/28/2023)    Housing Stability Vital Sign     Unable to Pay for Housing in the Last Year: No     Number of Places Lived in the Last Year: 1     Unstable Housing in the Last Year: No     Past Surgical History:   Procedure Laterality Date    ABLATION, PROSTATE, HIGH INTENSITY FOCUSED ULTRASOUND (HIFU)  7/6/2021    Procedure: ABLATION,PROSTATE,HIGH INTENSITY FOCUSED ULTRASOUND (HIFU);  Surgeon: David Rhodes MD;  Location: [address];  Service: Urology;;    COLONOSCOPY W/ BIOPSIES  3/4/15    / Dr. Ellis at [facility].    LIVER BIOPSY  1998    trus/bx  "2018      trus/bx 2019       Patient Active Problem List   Diagnosis    -HTN    Hep C w/o coma, chronic    BPH - stable off of Flomax    Nocturia    -Prostate cancer - s/p Prostate Bx 11/25/19 - s/p HIFU 7/6/21    Decreased ROM of intervertebral discs of thoracic spine    Thoracic spine pain    Overweight (BMI 25.0-29.9)    -Other male erectile dysfunction - controlled on PRN Viagra    Refractive error    Nuclear sclerosis, bilateral    Cortical age-related cataract of both eyes    -Hepatitis C virus infection - cured s/p antiviral treatment - SVR12 - 1/2022 (g1a, F2-3)    -Insomnia    -PAD (peripheral artery disease)    Chronic insomnia     Review of Systems   Constitutional:  Negative for appetite change, chills, fatigue, fever and unexpected weight change.   HENT:  Negative for nosebleeds.    Respiratory:  Negative for shortness of breath and wheezing.    Cardiovascular:  Negative for chest pain, palpitations and leg swelling.   Gastrointestinal:  Negative for abdominal distention, abdominal pain, constipation, diarrhea, nausea and vomiting.   Genitourinary:  Positive for nocturia. Negative for dysuria and hematuria.   Musculoskeletal:  Negative for arthralgias and back pain.   Skin:  Negative for pallor.   Neurological:  Negative for dizziness, seizures and syncope.   Hematological:  Negative for adenopathy.   Psychiatric/Behavioral:  Positive for sleep disturbance. Negative for dysphoric mood.          Objective:      There were no vitals taken for this visit.  Estimated body mass index is 24.39 kg/m² as calculated from the following:    Height as of 4/28/23: 5' 10" (1.778 m).    Weight as of 4/28/23: 77.1 kg (170 lb).  Physical Exam  Constitutional:       General: He is not in acute distress.     Appearance: He is well-developed. He is not diaphoretic.   HENT:      Head: Atraumatic.   Neck:      Trachea: No tracheal deviation.   Cardiovascular:      Rate and Rhythm: Normal rate.   Pulmonary:      Effort: " Pulmonary effort is normal. No respiratory distress.      Breath sounds: No wheezing.   Abdominal:      General: Bowel sounds are normal. There is no distension.      Palpations: Abdomen is soft. There is no mass.      Tenderness: There is no abdominal tenderness. There is no guarding or rebound.   Skin:     General: Skin is warm and dry.   Neurological:      Mental Status: He is alert and oriented to person, place, and time.   Psychiatric:         Behavior: Behavior normal.         Thought Content: Thought content normal.         Judgment: Judgment normal.           Assessment and Plan:           Problem List Items Addressed This Visit       -Prostate cancer - s/p Prostate Bx 11/25/19 - s/p HIFU 7/6/21 - Primary       Follow up:   6 months with PSA, MRI.    David Rhodes

## 2023-08-17 ENCOUNTER — OFFICE VISIT (OUTPATIENT)
Dept: UROLOGY | Facility: CLINIC | Age: 73
End: 2023-08-17
Payer: MEDICARE

## 2023-08-17 VITALS
WEIGHT: 170.19 LBS | DIASTOLIC BLOOD PRESSURE: 66 MMHG | HEART RATE: 60 BPM | HEIGHT: 70 IN | BODY MASS INDEX: 24.37 KG/M2 | SYSTOLIC BLOOD PRESSURE: 135 MMHG

## 2023-08-17 DIAGNOSIS — C61 PROSTATE CANCER: Primary | ICD-10-CM

## 2023-08-17 PROCEDURE — 99999 PR PBB SHADOW E&M-EST. PATIENT-LVL III: ICD-10-PCS | Mod: PBBFAC,HCNC,, | Performed by: UROLOGY

## 2023-08-17 PROCEDURE — 1126F PR PAIN SEVERITY QUANTIFIED, NO PAIN PRESENT: ICD-10-PCS | Mod: HCNC,CPTII,S$GLB, | Performed by: UROLOGY

## 2023-08-17 PROCEDURE — 99214 PR OFFICE/OUTPT VISIT, EST, LEVL IV, 30-39 MIN: ICD-10-PCS | Mod: HCNC,S$GLB,, | Performed by: UROLOGY

## 2023-08-17 PROCEDURE — 1101F PT FALLS ASSESS-DOCD LE1/YR: CPT | Mod: HCNC,CPTII,S$GLB, | Performed by: UROLOGY

## 2023-08-17 PROCEDURE — 99214 OFFICE O/P EST MOD 30 MIN: CPT | Mod: HCNC,S$GLB,, | Performed by: UROLOGY

## 2023-08-17 PROCEDURE — 1160F RVW MEDS BY RX/DR IN RCRD: CPT | Mod: HCNC,CPTII,S$GLB, | Performed by: UROLOGY

## 2023-08-17 PROCEDURE — 3075F PR MOST RECENT SYSTOLIC BLOOD PRESS GE 130-139MM HG: ICD-10-PCS | Mod: HCNC,CPTII,S$GLB, | Performed by: UROLOGY

## 2023-08-17 PROCEDURE — 1101F PR PT FALLS ASSESS DOC 0-1 FALLS W/OUT INJ PAST YR: ICD-10-PCS | Mod: HCNC,CPTII,S$GLB, | Performed by: UROLOGY

## 2023-08-17 PROCEDURE — 99999 PR PBB SHADOW E&M-EST. PATIENT-LVL III: CPT | Mod: PBBFAC,HCNC,, | Performed by: UROLOGY

## 2023-08-17 PROCEDURE — 3008F BODY MASS INDEX DOCD: CPT | Mod: HCNC,CPTII,S$GLB, | Performed by: UROLOGY

## 2023-08-17 PROCEDURE — 3288F FALL RISK ASSESSMENT DOCD: CPT | Mod: HCNC,CPTII,S$GLB, | Performed by: UROLOGY

## 2023-08-17 PROCEDURE — 3078F PR MOST RECENT DIASTOLIC BLOOD PRESSURE < 80 MM HG: ICD-10-PCS | Mod: HCNC,CPTII,S$GLB, | Performed by: UROLOGY

## 2023-08-17 PROCEDURE — 3075F SYST BP GE 130 - 139MM HG: CPT | Mod: HCNC,CPTII,S$GLB, | Performed by: UROLOGY

## 2023-08-17 PROCEDURE — 1126F AMNT PAIN NOTED NONE PRSNT: CPT | Mod: HCNC,CPTII,S$GLB, | Performed by: UROLOGY

## 2023-08-17 PROCEDURE — 1159F MED LIST DOCD IN RCRD: CPT | Mod: HCNC,CPTII,S$GLB, | Performed by: UROLOGY

## 2023-08-17 PROCEDURE — 1160F PR REVIEW ALL MEDS BY PRESCRIBER/CLIN PHARMACIST DOCUMENTED: ICD-10-PCS | Mod: HCNC,CPTII,S$GLB, | Performed by: UROLOGY

## 2023-08-17 PROCEDURE — 3078F DIAST BP <80 MM HG: CPT | Mod: HCNC,CPTII,S$GLB, | Performed by: UROLOGY

## 2023-08-17 PROCEDURE — 3288F PR FALLS RISK ASSESSMENT DOCUMENTED: ICD-10-PCS | Mod: HCNC,CPTII,S$GLB, | Performed by: UROLOGY

## 2023-08-17 PROCEDURE — 3008F PR BODY MASS INDEX (BMI) DOCUMENTED: ICD-10-PCS | Mod: HCNC,CPTII,S$GLB, | Performed by: UROLOGY

## 2023-08-17 PROCEDURE — 1159F PR MEDICATION LIST DOCUMENTED IN MEDICAL RECORD: ICD-10-PCS | Mod: HCNC,CPTII,S$GLB, | Performed by: UROLOGY

## 2023-09-06 DIAGNOSIS — I10 ESSENTIAL HYPERTENSION: ICD-10-CM

## 2023-09-11 ENCOUNTER — PATIENT MESSAGE (OUTPATIENT)
Dept: ADMINISTRATIVE | Facility: HOSPITAL | Age: 73
End: 2023-09-11
Payer: MEDICARE

## 2023-12-27 ENCOUNTER — PATIENT OUTREACH (OUTPATIENT)
Dept: ADMINISTRATIVE | Facility: HOSPITAL | Age: 73
End: 2023-12-27
Payer: MEDICARE

## 2023-12-27 ENCOUNTER — PATIENT MESSAGE (OUTPATIENT)
Dept: ADMINISTRATIVE | Facility: HOSPITAL | Age: 73
End: 2023-12-27
Payer: MEDICARE

## 2024-01-05 ENCOUNTER — OFFICE VISIT (OUTPATIENT)
Dept: FAMILY MEDICINE | Facility: CLINIC | Age: 74
End: 2024-01-05
Payer: MEDICARE

## 2024-01-05 VITALS
SYSTOLIC BLOOD PRESSURE: 134 MMHG | TEMPERATURE: 98 F | OXYGEN SATURATION: 99 % | HEART RATE: 59 BPM | BODY MASS INDEX: 25.01 KG/M2 | DIASTOLIC BLOOD PRESSURE: 75 MMHG | WEIGHT: 174.25 LBS

## 2024-01-05 DIAGNOSIS — Z86.19 HEPATITIS C VIRUS INFECTION CURED AFTER ANTIVIRAL DRUG THERAPY: ICD-10-CM

## 2024-01-05 DIAGNOSIS — Z00.00 ANNUAL PHYSICAL EXAM: Primary | ICD-10-CM

## 2024-01-05 DIAGNOSIS — N40.1 BPH WITH OBSTRUCTION/LOWER URINARY TRACT SYMPTOMS: ICD-10-CM

## 2024-01-05 DIAGNOSIS — N52.8 OTHER MALE ERECTILE DYSFUNCTION: ICD-10-CM

## 2024-01-05 DIAGNOSIS — E78.2 MIXED HYPERLIPIDEMIA: ICD-10-CM

## 2024-01-05 DIAGNOSIS — N13.8 BPH WITH OBSTRUCTION/LOWER URINARY TRACT SYMPTOMS: ICD-10-CM

## 2024-01-05 DIAGNOSIS — C61 PROSTATE CANCER: ICD-10-CM

## 2024-01-05 DIAGNOSIS — B18.2 HEP C W/O COMA, CHRONIC: ICD-10-CM

## 2024-01-05 DIAGNOSIS — R73.09 ABNORMAL GLUCOSE: ICD-10-CM

## 2024-01-05 DIAGNOSIS — I73.9 PAD (PERIPHERAL ARTERY DISEASE): ICD-10-CM

## 2024-01-05 DIAGNOSIS — E66.3 OVERWEIGHT (BMI 25.0-29.9): ICD-10-CM

## 2024-01-05 DIAGNOSIS — I10 ESSENTIAL HYPERTENSION: ICD-10-CM

## 2024-01-05 DIAGNOSIS — G47.09 OTHER INSOMNIA: ICD-10-CM

## 2024-01-05 PROCEDURE — 1101F PT FALLS ASSESS-DOCD LE1/YR: CPT | Mod: CPTII,S$GLB,, | Performed by: FAMILY MEDICINE

## 2024-01-05 PROCEDURE — 1126F AMNT PAIN NOTED NONE PRSNT: CPT | Mod: CPTII,S$GLB,, | Performed by: FAMILY MEDICINE

## 2024-01-05 PROCEDURE — 3075F SYST BP GE 130 - 139MM HG: CPT | Mod: CPTII,S$GLB,, | Performed by: FAMILY MEDICINE

## 2024-01-05 PROCEDURE — 1159F MED LIST DOCD IN RCRD: CPT | Mod: CPTII,S$GLB,, | Performed by: FAMILY MEDICINE

## 2024-01-05 PROCEDURE — 99397 PER PM REEVAL EST PAT 65+ YR: CPT | Mod: S$GLB,,, | Performed by: FAMILY MEDICINE

## 2024-01-05 PROCEDURE — 3008F BODY MASS INDEX DOCD: CPT | Mod: CPTII,S$GLB,, | Performed by: FAMILY MEDICINE

## 2024-01-05 PROCEDURE — 99999 PR PBB SHADOW E&M-EST. PATIENT-LVL IV: CPT | Mod: PBBFAC,,, | Performed by: FAMILY MEDICINE

## 2024-01-05 PROCEDURE — 1160F RVW MEDS BY RX/DR IN RCRD: CPT | Mod: CPTII,S$GLB,, | Performed by: FAMILY MEDICINE

## 2024-01-05 PROCEDURE — 3078F DIAST BP <80 MM HG: CPT | Mod: CPTII,S$GLB,, | Performed by: FAMILY MEDICINE

## 2024-01-05 PROCEDURE — 3288F FALL RISK ASSESSMENT DOCD: CPT | Mod: CPTII,S$GLB,, | Performed by: FAMILY MEDICINE

## 2024-01-05 NOTE — PROGRESS NOTES
Physical Exam  /75 (BP Location: Right arm, Patient Position: Sitting, BP Method: Medium (Manual))   Pulse (!) 59   Temp 97.7 °F (36.5 °C) (Oral)   Wt 79.1 kg (174 lb 4.4 oz)   SpO2 99%   BMI 25.01 kg/m²      Office Visit    Patient Name: Sah Triplett    : 1950  MRN: [id number]      Assessment/Plan:  Sha Triplett is a 73 y.o. male who presents today for :    Annual physical exam  -anticipatory guidance provided with age appropriate preventative services discussed, healthy diet and regular physical exercise also discussed with patient      -HTN  Overweight (BMI 25.0-29.9)  -PAD (peripheral artery disease)  Mixed hyperlipidemia  -     Lipid Panel; Future; Expected date: 2024  -diet controlled  -DASH diet, regular cardiovascular exercises     -Hx Insomnia  -sleep hygiene reviewed     Hx Prostate cancer - s/p Prostate Bx 19 - s/p HIFU 21  -doing well overall, continue Kegel exercises and f/u with Urology regularly.      -Male erectile dysfunction - controlled on PRN Viagra   BPH w/ LUTs - hesitant on restarting Flomax, recheck labs, will have him follow up Urology as scheduled.    -Hx Hep C w/o coma, chronic - genotype 1a - Abd U/S 2021 w/ mild hepatomegaly but otherwise normal - follows HEP - started Epclusa therapy 7/15/21  -stable, recheck labs      Follow up 6mo    This note was created by combination of typed  and MModal dictation.  Transcription errors may be present.  If there are any questions, please contact me.        ----------------------------------------------------------------------------------------------------------------------      HPI:  Patient Care Team:  Jude Kee MD as PCP - General (Family Medicine)  Fly Vazquez MD as Consulting Physician (Urology)  Filiberto Varela OD as Consulting Physician (Optometry)    Sah is a 73 y.o. male with      Patient Active Problem List   Diagnosis    -HTN    Hep C w/o coma, chronic    BPH -  stable off of Flomax    Nocturia    -Prostate cancer - s/p Prostate Bx 11/25/19 - s/p HIFU 7/6/21    Decreased ROM of intervertebral discs of thoracic spine    Thoracic spine pain    Overweight (BMI 25.0-29.9)    -Other male erectile dysfunction - controlled on PRN Viagra    Refractive error    Nuclear sclerosis, bilateral    Cortical age-related cataract of both eyes    -Hepatitis C virus infection - cured s/p antiviral treatment - SVR12 - 1/2022 (g1a, F2-3)    -Insomnia    -PAD (peripheral artery disease)    Chronic insomnia       Sha presents today for Annual checkup     His  last Annual checkup with me was well over a year ago.  [facility] He follows urology regularly for surveillance of his Hx of prostate cancer - he does endorse urgency and frequency that's chronic, for which we discussed the need to recheck PSA, as well as follow up with Urology.  His BP is well controlled with staying off of his Amlodipine at this time, as he states he does not want to rely on pills any more. He denies any cardiovascular or neurologic complaints today        Wt Readings from Last 4 Encounters:   01/05/24 79.1 kg (174 lb 4.4 oz)   08/17/23 77.2 kg (170 lb 3.1 oz)   04/28/23 77.1 kg (170 lb)   02/16/23 77.6 kg (171 lb 1.2 oz)       Answers submitted by the patient for this visit:  Review of Systems Questionnaire (Submitted on 12/29/2023)  activity change: No  unexpected weight change: No  neck pain: No  hearing loss: No  rhinorrhea: No  trouble swallowing: No  eye discharge: No  visual disturbance: Yes  chest tightness: No  wheezing: No  chest pain: No  palpitations: No  blood in stool: No  constipation: No  vomiting: No  diarrhea: No  polydipsia: No  polyuria: Yes  difficulty urinating: Yes  urgency: Yes  hematuria: No  joint swelling: No  arthralgias: No  headaches: No  weakness: Yes  confusion: No  dysphoric mood: No            Current Medications  Medications reviewed and  updated.       Current Outpatient Medications:     b complex vitamins tablet, Take 1 tablet by mouth once daily., Disp: , Rfl:     cetirizine (ZYRTEC) 10 MG tablet, Take 1 tablet (10 mg total) by mouth every evening., Disp: 60 tablet, Rfl: 2    cyanocobalamin, vitamin B-12, 50 mcg tablet, Take 50 mcg by mouth once daily., Disp: , Rfl:     fish oil-omega-3 fatty acids 300-1,000 mg capsule, Take 2 g by mouth once daily., Disp: , Rfl:     milk thistle 175 mg tablet, Take 175 mg by mouth once daily., Disp: , Rfl:     multivitamin capsule, Take 1 capsule by mouth once daily., Disp: , Rfl:     PSYLLIUM SEED, WITH DEXTROSE, (CILIUM ORAL), Take by mouth., Disp: , Rfl:     amLODIPine (NORVASC) 5 MG tablet, Take 1 tablet (5 mg total) by mouth once daily. Followup Dr.T Kee FEBRUARY 2023 for more refills (Patient not taking: Reported on 4/28/2023), Disp: 90 tablet, Rfl: 1    atorvastatin (LIPITOR) 10 MG tablet, Take 1 tablet (10 mg total) by mouth once daily. (Patient not taking: Reported on 4/28/2023), Disp: 90 tablet, Rfl: 3    fluticasone propionate (FLONASE) 50 mcg/actuation nasal spray, SHAKE LIQUID AND USE 1 SPRAY(50 MCG) IN EACH NOSTRIL TWICE DAILY AS NEEDED FOR RHINITIS (Patient not taking: Reported on 4/28/2023), Disp: 48 g, Rfl: 0    HYDROcodone-acetaminophen (NORCO) 7.5-325 mg per tablet, Take by mouth., Disp: , Rfl:     traZODone (DESYREL) 50 MG tablet, , Disp: , Rfl:     zolpidem (AMBIEN) 10 mg Tab, Take 1 tablet (10 mg total) by mouth nightly as needed (insomnia). (Patient not taking: Reported on 1/5/2024), Disp: 30 tablet, Rfl: 3    Past Surgical History:   Procedure Laterality Date    ABLATION, PROSTATE, HIGH INTENSITY FOCUSED ULTRASOUND (HIFU)  7/6/2021    Procedure: ABLATION,PROSTATE,HIGH INTENSITY FOCUSED ULTRASOUND (HIFU);  Surgeon: David Rhodes MD;  Location: [facility] OR [address] [place] [address];  Service: Urology;;    COLONOSCOPY W/ BIOPSIES  3/4/15    / Dr. Ellis at [facility].    LIVER BIOPSY  1998    trus/bx 2018       "trus/bx 2019         Family History   Problem Relation Age of Onset    Alzheimer's disease Mother     Macular degeneration Father     Cataracts Father     Blindness Father     Alzheimer's disease Father          35 days after pt's mother    Pulmonary fibrosis Brother     COPD Brother     Pneumonia Brother 3    Macular degeneration Paternal Aunt     Other Paternal Uncle         possibly 1-2 with prostate CA    Other Maternal Grandmother         hx?    Other Maternal Grandfather         "diseases in his body"    Other Paternal Grandmother         hx?    Prostate cancer Paternal Grandfather         POSSIBLE (heard "rumors")    Prostate cancer Paternal Cousin 62        s/p radioactive pellets    Amblyopia Neg Hx     Diabetes Neg Hx     Glaucoma Neg Hx     Hypertension Neg Hx     Retinal detachment Neg Hx     Strabismus Neg Hx     Stroke Neg Hx     Thyroid disease Neg Hx     Colon polyps Neg Hx        Social History     Socioeconomic History    Marital status:    Tobacco Use    Smoking status: Former     Current packs/day: 0.50     Average packs/day: 0.5 packs/day for 20.0 years (10.0 ttl pk-yrs)     Types: Cigarettes    Smokeless tobacco: Former    Tobacco comments:     Quit smoking about 10 years ago    Substance and Sexual Activity    Alcohol use: No    Drug use: Yes     Types: Marijuana     Comment: past use of pot and IV drug use after [place]    Sexual activity: Not Currently   Social History Narrative     x 2.  Two bio kids. One adopted.   Retired [employer].  Now a non smoker.  Chris Nam .       Social Determinants of Health     Financial Resource Strain: Low Risk  (2023)    Overall Financial Resource Strain (CARDIA)     Difficulty of Paying Living Expenses: Not hard at all   Food Insecurity: No Food Insecurity (2023)    Hunger Vital Sign     Worried About Running Out of Food in the Last Year: Never true     Ran Out of Food in the Last Year: Never true   Transportation Needs: No " Transportation Needs (12/29/2023)    PRAPARE - Transportation     Lack of Transportation (Medical): No     Lack of Transportation (Non-Medical): No   Physical Activity: Sufficiently Active (12/29/2023)    Exercise Vital Sign     Days of Exercise per Week: 4 days     Minutes of Exercise per Session: 50 min   Stress: Stress Concern Present (12/29/2023)    [employer] [place] [employer] - Occupational Stress Questionnaire     Feeling of Stress : To some extent   Social Connections: Moderately Integrated (12/29/2023)    Social Connection and Isolation Panel [NHANES]     Frequency of Communication with Friends and Family: Three times a week     Frequency of Social Gatherings with Friends and Family: More than three times a week     Attends Taoist Services: More than 4 times per year     Active Member of Clubs or Organizations: Yes     Attends Club or Organization Meetings: More than 4 times per year     Marital Status:    Housing Stability: Low Risk  (12/29/2023)    Housing Stability Vital Sign     Unable to Pay for Housing in the Last Year: No     Number of Places Lived in the Last Year: 1     Unstable Housing in the Last Year: No           Allergies   Review of patient's allergies indicates:   Allergen Reactions    Sinus allergy Shortness Of Breath             Review of Systems  See HPI      [unfilled]  /75 (BP Location: Right arm, Patient Position: Sitting, BP Method: Medium (Manual))   Pulse (!) 59   Temp 97.7 °F (36.5 °C) (Oral)   Wt 79.1 kg (174 lb 4.4 oz)   SpO2 99%   BMI 25.01 kg/m²     GEN: NAD, well developed, pleasant, well nourished  HEENT: NCAT, PERRLA, EOMI, sclera clear, anicteric, bilateral ear exam wnl, O/P clear, MMM with no lesions  NECK: normal, supple with midline trachea, no LAD, no thyromegaly  LUNGS: CTAB, no w/r/r, no increased work of breathing   HEART: RRR, normal S1 and S2, no m/r/g, no edema  ABD: s/nt/nd, NABS  SKIN: normal turgor, no rashes  PSYCH: AOx3,  appropriate mood and affect  MSK: warm/well perfused, normal ROM in all extremities, no c/c/e.  NEURO: normal without focal findings, CN II-XII are grossly intact.  Sensation/strength grossly normal, gait and station normal.

## 2024-01-11 PROBLEM — E78.2 MIXED HYPERLIPIDEMIA: Status: ACTIVE | Noted: 2024-01-11

## 2024-02-14 ENCOUNTER — HOSPITAL ENCOUNTER (OUTPATIENT)
Dept: RADIOLOGY | Facility: HOSPITAL | Age: 74
Discharge: HOME | End: 2024-02-14
Attending: UROLOGY
Payer: MEDICARE

## 2024-02-14 DIAGNOSIS — C61 PROSTATE CANCER: ICD-10-CM

## 2024-02-14 PROCEDURE — 25500020 PHARM REV CODE 255: Performed by: UROLOGY

## 2024-02-14 PROCEDURE — 72197 MRI PELVIS W/O & W/DYE: CPT | Mod: 26,,, | Performed by: RADIOLOGY

## 2024-02-14 PROCEDURE — A9585 GADOBUTROL INJECTION: HCPCS | Performed by: UROLOGY

## 2024-02-14 PROCEDURE — 72197 MRI PELVIS W/O & W/DYE: CPT | Mod: TC

## 2024-02-14 RX ORDER — GADOBUTROL 604.72 MG/ML
10 INJECTION INTRAVENOUS
Status: COMPLETED | OUTPATIENT
Start: 2024-02-14 | End: 2024-02-14

## 2024-02-14 RX ADMIN — GADOBUTROL 10 ML: 604.72 INJECTION INTRAVENOUS at 01:02

## 2024-02-14 NOTE — PROGRESS NOTES
Clinic Note  2/14/2024      Subjective:         Chief Complaint:   HPI  Sha Triplett is a 73 y.o. male with prostate cancer.  [place] vet, retired from [employer]  HIFU (high intensity focused ultrasound) 7/6/2021- right theodora-ablation. PSA has declined 9.3 to 3.6.      MRI 2/22/2022- 21 ccs, HIFU (high intensity focused ultrasound) post treatment effect, 0.7 cm RAantPZ PI-RADS 3.     Doing well s/p HIFU. Had some irritative voiding symptoms and foul smelling around 8/3, treated empirically with cipro.     FH - paternal cousin, uncle, paternal grandfather  PSA - 9.3  Stage - T1C  Volume - 38 ccs  MRI - 1/24/2020- L1- right apex 0.8 cm PI-RADS 4 lesion, L2 right apex/M 1.2 cm PI-RADS 3 lesion. Negative extra prostatic extension, neurovascular bundle involvement, SVI.  Biopsy - 11/26/2018 [place] 6 right apex 1/1 5%. 1/12 cores positive.  11/25/2019- [place] 4+3 RML 1/1 5%, right middle 1/1 1%, RAL 1/1 2%, right apex 1/1 2%. Overall 4/12 cores positive.  1/4/2021 [place] 3+4 RAL 1/1 20%, right apex 1/1 35%; Richie 6 RBL 1/1 10%, right base 1%, RML 10 %, right mid  20 %. Overall 6/12+.  CAMILLE Score - 6 high risk  NCCN - unfavorable intermediate  Nodes positive risk MSKCC-13%  Germline testing -discussed, interested  Somatic testing -discussed     8/25/2022- PSA 5.5. MRI 20 ccs, stable appearance of right theodora-ablation zone with no evidence of recurrence. Stable 0.6 cm LAPZ PI-RADS 3 lesion.  Had PSA 3.5 on 8/1/2022.  Discussed AS (active surveillance) vs re-biopsy and targeting left lesion.     8/17/2023- PSA 5.0.  LUTS- nocturia 1x/noc, decreased force of stream, empties completely.  Discussed  surveillance, PSA, MRI, re-biopsy.  [place] Vet.     2/15/2024- PSA 7.4  MRI- 22 ccs, stable ablative zone on the right; stable 0.6 cm PI-RADS 3 lesion LAPZ is stable.    Lab Results   Component Value Date    PSADIAG 7.4 (H) 02/14/2024    PSADIAG 5.0 (H) 08/16/2023    PSADIAG 4.4 (H) 02/13/2023    PSADIAG 5.5 (H)  "2022    PSADIAG 3.3 2022    PSADIAG 3.6 2021    PSADIAG 9.3 (H) 2021    PSADIAG 6.8 (H) 11/10/2020    PSADIAG 5.7 (H) 2020    PSADIAG 6.7 (H) 2020           Past Medical History:   Diagnosis Date    -HLD 2024    -Insomnia 2022    -PAD (peripheral artery disease) 2022    Hepatitis C virus infection cured after antiviral drug therapy     s/p Rx (treated / cured ) - SVR12 - 2022 (g1a, F2-3)    History of posttraumatic stress disorder (PTSD)     [facility] - 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    Other Maternal Grandfather         "diseases in his body"    Other Paternal Grandmother         hx?    Prostate cancer Paternal Grandfather         POSSIBLE (heard "rumors")    Prostate cancer Paternal Cousin 62        s/p radioactive pellets    Amblyopia Neg Hx     Diabetes Neg Hx     Glaucoma Neg Hx     Hypertension Neg Hx     Retinal detachment Neg Hx     Strabismus Neg Hx     Stroke Neg Hx     Thyroid disease Neg Hx     Colon polyps Neg Hx      Social History     Socioeconomic History    Marital status:    Tobacco Use    Smoking status: Former     Current packs/day: 0.50     Average packs/day: 0.5 packs/day for 20.0 years (10.0 ttl pk-yrs)     Types: Cigarettes    Smokeless tobacco: Former    Tobacco comments:     Quit smoking about 10 years ago    Substance and Sexual Activity    Alcohol use: No    " Drug use: Yes     Types: Marijuana     Comment: past use of pot and IV drug use after [place]    Sexual activity: Not Currently   Social History Narrative     x 2.  Two bio kids. One adopted.   Retired [employer].  Now a non smoker.  [employer] .       Social Determinants of Health     Financial Resource Strain: Low Risk  (12/29/2023)    Overall Financial Resource Strain (CARDIA)     Difficulty of Paying Living Expenses: Not hard at all   Food Insecurity: No Food Insecurity (12/29/2023)    Hunger Vital Sign     Worried About Running Out of Food in the Last Year: Never true     Ran Out of Food in the Last Year: Never true   Transportation Needs: No Transportation Needs (12/29/2023)    PRAPARE - Transportation     Lack of Transportation (Medical): No     Lack of Transportation (Non-Medical): No   Physical Activity: Sufficiently Active (12/29/2023)    Exercise Vital Sign     Days of Exercise per Week: 4 days     Minutes of Exercise per Session: 50 min   Stress: Stress Concern Present (12/29/2023)    [place] [employer] - Occupational Stress Questionnaire     Feeling of Stress : To some extent   Social Connections: Moderately Integrated (12/29/2023)    Social Connection and Isolation Panel [NHANES]     Frequency of Communication with Friends and Family: Three times a week     Frequency of Social Gatherings with Friends and Family: More than three times a week     Attends Adventist Services: More than 4 times per year     Active Member of Clubs or Organizations: Yes     Attends Club or Organization Meetings: More than 4 times per year     Marital Status:    Housing Stability: Low Risk  (12/29/2023)    Housing Stability Vital Sign     Unable to Pay for Housing in the Last Year: No     Number of Places Lived in the Last Year: 1     Unstable Housing in the Last Year: No     Past Surgical History:   Procedure Laterality Date    ABLATION, PROSTATE, HIGH INTENSITY FOCUSED  "ULTRASOUND (HIFU)  7/6/2021    Procedure: ABLATION,PROSTATE,HIGH INTENSITY FOCUSED ULTRASOUND (HIFU);  Surgeon: David Rhodes MD;  Location: [facility] OR [address] [place] [address];  Service: Urology;;    COLONOSCOPY W/ BIOPSIES  3/4/15    / Dr. Ellis at [facility].    LIVER BIOPSY  1998    trus/bx 2018      trus/bx 2019       Patient Active Problem List   Diagnosis    -HTN    Hep C w/o coma, chronic    BPH - stable off of Flomax    Nocturia    -Prostate cancer - s/p Prostate Bx 11/25/19 - s/p HIFU 7/6/21    Decreased ROM of intervertebral discs of thoracic spine    Thoracic spine pain    Overweight (BMI 25.0-29.9)    -Other male erectile dysfunction - controlled on PRN Viagra    Refractive error    Nuclear sclerosis, bilateral    Cortical age-related cataract of both eyes    -Hepatitis C virus infection - cured s/p antiviral treatment - SVR12 - 1/2022 (g1a, F2-3)    -Insomnia    -PAD (peripheral artery disease)    Chronic insomnia    -HLD     Review of Systems   Constitutional:  Negative for appetite change, chills, fatigue, fever and unexpected weight change.   HENT:  Negative for nosebleeds.    Respiratory:  Negative for shortness of breath and wheezing.    Cardiovascular:  Negative for chest pain, palpitations and leg swelling.   Gastrointestinal:  Negative for abdominal distention, abdominal pain, constipation, diarrhea, nausea and vomiting.   Genitourinary:  Negative for dysuria and hematuria.   Musculoskeletal:  Negative for arthralgias and back pain.   Skin:  Negative for pallor.   Neurological:  Negative for dizziness, seizures and syncope.   Hematological:  Negative for adenopathy.   Psychiatric/Behavioral:  Negative for dysphoric mood.        Objective:      There were no vitals taken for this visit.  Estimated body mass index is 25.01 kg/m² as calculated from the following:    Height as of 8/17/23: 5' 10" (1.778 m).    Weight as of 1/5/24: 79.1 kg (174 lb 4.4 oz).  Physical Exam      Assessment and Plan: "   Discussed PSA, MRI results. Discussed surveillance vs biopsy. Explained advantage and risks of each.  Patient would like to pursue surveillance for another 6 months.        Problem List Items Addressed This Visit       -Prostate cancer - s/p Prostate Bx 11/25/19 - s/p HIFU 7/6/21 - Primary       Follow up:       David Rhodes

## 2024-02-15 ENCOUNTER — OFFICE VISIT (OUTPATIENT)
Dept: UROLOGY | Facility: CLINIC | Age: 74
End: 2024-02-15
Payer: MEDICARE

## 2024-02-15 VITALS
BODY MASS INDEX: 25.47 KG/M2 | SYSTOLIC BLOOD PRESSURE: 147 MMHG | DIASTOLIC BLOOD PRESSURE: 69 MMHG | WEIGHT: 177.94 LBS | HEART RATE: 70 BPM | HEIGHT: 70 IN

## 2024-02-15 DIAGNOSIS — C61 PROSTATE CANCER: Primary | ICD-10-CM

## 2024-02-15 PROCEDURE — 99999 PR PBB SHADOW E&M-EST. PATIENT-LVL III: CPT | Mod: PBBFAC,,, | Performed by: UROLOGY

## 2024-02-15 PROCEDURE — 99214 OFFICE O/P EST MOD 30 MIN: CPT | Mod: S$GLB,,, | Performed by: UROLOGY

## 2024-02-19 ENCOUNTER — PATIENT MESSAGE (OUTPATIENT)
Dept: UROLOGY | Facility: CLINIC | Age: 74
End: 2024-02-19
Payer: MEDICARE

## 2024-02-28 ENCOUNTER — OFFICE VISIT (OUTPATIENT)
Dept: OPTOMETRY | Facility: CLINIC | Age: 74
End: 2024-02-28
Payer: MEDICARE

## 2024-02-28 DIAGNOSIS — H25.013 CORTICAL AGE-RELATED CATARACT OF BOTH EYES: ICD-10-CM

## 2024-02-28 DIAGNOSIS — H04.123 DRY EYE SYNDROME, BILATERAL: ICD-10-CM

## 2024-02-28 DIAGNOSIS — H25.13 NUCLEAR SCLEROSIS, BILATERAL: Primary | ICD-10-CM

## 2024-02-28 DIAGNOSIS — H52.7 REFRACTIVE ERROR: ICD-10-CM

## 2024-02-28 PROCEDURE — 1101F PT FALLS ASSESS-DOCD LE1/YR: CPT | Mod: CPTII,S$GLB,, | Performed by: OPTOMETRIST

## 2024-02-28 PROCEDURE — 1159F MED LIST DOCD IN RCRD: CPT | Mod: CPTII,S$GLB,, | Performed by: OPTOMETRIST

## 2024-02-28 PROCEDURE — 3288F FALL RISK ASSESSMENT DOCD: CPT | Mod: CPTII,S$GLB,, | Performed by: OPTOMETRIST

## 2024-02-28 PROCEDURE — 1160F RVW MEDS BY RX/DR IN RCRD: CPT | Mod: CPTII,S$GLB,, | Performed by: OPTOMETRIST

## 2024-02-28 PROCEDURE — 3044F HG A1C LEVEL LT 7.0%: CPT | Mod: CPTII,S$GLB,, | Performed by: OPTOMETRIST

## 2024-02-28 PROCEDURE — 1126F AMNT PAIN NOTED NONE PRSNT: CPT | Mod: CPTII,S$GLB,, | Performed by: OPTOMETRIST

## 2024-02-28 PROCEDURE — 99999 PR PBB SHADOW E&M-EST. PATIENT-LVL III: CPT | Mod: PBBFAC,,, | Performed by: OPTOMETRIST

## 2024-02-28 PROCEDURE — 92014 COMPRE OPH EXAM EST PT 1/>: CPT | Mod: S$GLB,,, | Performed by: OPTOMETRIST

## 2024-02-28 PROCEDURE — 92015 DETERMINE REFRACTIVE STATE: CPT | Mod: S$GLB,,, | Performed by: OPTOMETRIST

## 2024-02-28 NOTE — PROGRESS NOTES
Subjective:       Patient ID: Sha Triplett is a 73 y.o. male      Chief Complaint   Patient presents with    Concerns About Ocular Health     History of Present Illness    Dls: 12/7/22 Dr. Delvalle     74 y/o female presents today with c/o blurry vision near ou.   Pt wears bifocal glasses.   Pt c/o dry eyes ou.     No tearing  + ou off/on itching   No burning  No pain  No ha's  + ou floaters  No flashes    Eye meds  Otc gtts ou prn     Pohx:   None    Fohx:   MD- aunt, father   Cat        Assessment/Plan:     1. Nuclear sclerosis, bilateral  2. Cortical age-related cataract of both eyes  Educated pt on presence of cataracts and effects on vision. No surgery at this time. Recheck in one year, sooner PRN.    3. Dry eye syndrome, bilateral  Recommend Refresh/Systane BID - QID OU PRN.     4. Refractive error  Educated patient on refractive error and discussed lens options. Dispensed updated spectacle Rx. Educated about adaptation period to new specs.    Eyeglass Final Rx       Eyeglass Final Rx         Sphere Cylinder Axis Add    Right -2.25 +2.25 180 +2.75    Left -2.25 +1.25 170 +2.75      Expiration Date: 2/28/2025                      Follow up in about 1 year (around 2/28/2025).

## 2024-06-06 ENCOUNTER — PATIENT MESSAGE (OUTPATIENT)
Dept: FAMILY MEDICINE | Facility: CLINIC | Age: 74
End: 2024-06-06
Payer: MEDICARE

## 2024-06-06 NOTE — TELEPHONE ENCOUNTER
Please call and ask if he's available tomorrow at 11:00am? I can see him in the office.    Thanks.

## 2024-06-06 NOTE — TELEPHONE ENCOUNTER
Call was placed to patient and scheduled with PCP in regard to complaints of fatigue. Confirmation and thank you verbalized.

## 2024-06-07 ENCOUNTER — OFFICE VISIT (OUTPATIENT)
Dept: FAMILY MEDICINE | Facility: CLINIC | Age: 74
End: 2024-06-07
Payer: MEDICARE

## 2024-06-07 DIAGNOSIS — I10 ESSENTIAL HYPERTENSION: ICD-10-CM

## 2024-06-07 DIAGNOSIS — I73.9 PAD (PERIPHERAL ARTERY DISEASE): ICD-10-CM

## 2024-06-07 DIAGNOSIS — N40.1 BPH WITH OBSTRUCTION/LOWER URINARY TRACT SYMPTOMS: ICD-10-CM

## 2024-06-07 DIAGNOSIS — E78.2 MIXED HYPERLIPIDEMIA: ICD-10-CM

## 2024-06-07 DIAGNOSIS — G47.09 OTHER INSOMNIA: Primary | ICD-10-CM

## 2024-06-07 DIAGNOSIS — N13.8 BPH WITH OBSTRUCTION/LOWER URINARY TRACT SYMPTOMS: ICD-10-CM

## 2024-06-07 PROCEDURE — 1101F PT FALLS ASSESS-DOCD LE1/YR: CPT | Mod: CPTII,S$GLB,, | Performed by: FAMILY MEDICINE

## 2024-06-07 PROCEDURE — 1160F RVW MEDS BY RX/DR IN RCRD: CPT | Mod: CPTII,S$GLB,, | Performed by: FAMILY MEDICINE

## 2024-06-07 PROCEDURE — 3008F BODY MASS INDEX DOCD: CPT | Mod: CPTII,S$GLB,, | Performed by: FAMILY MEDICINE

## 2024-06-07 PROCEDURE — 3075F SYST BP GE 130 - 139MM HG: CPT | Mod: CPTII,S$GLB,, | Performed by: FAMILY MEDICINE

## 2024-06-07 PROCEDURE — 3044F HG A1C LEVEL LT 7.0%: CPT | Mod: CPTII,S$GLB,, | Performed by: FAMILY MEDICINE

## 2024-06-07 PROCEDURE — 3288F FALL RISK ASSESSMENT DOCD: CPT | Mod: CPTII,S$GLB,, | Performed by: FAMILY MEDICINE

## 2024-06-07 PROCEDURE — 99999 PR PBB SHADOW E&M-EST. PATIENT-LVL III: CPT | Mod: PBBFAC,,, | Performed by: FAMILY MEDICINE

## 2024-06-07 PROCEDURE — 99214 OFFICE O/P EST MOD 30 MIN: CPT | Mod: S$GLB,,, | Performed by: FAMILY MEDICINE

## 2024-06-07 PROCEDURE — 1126F AMNT PAIN NOTED NONE PRSNT: CPT | Mod: CPTII,S$GLB,, | Performed by: FAMILY MEDICINE

## 2024-06-07 PROCEDURE — 1159F MED LIST DOCD IN RCRD: CPT | Mod: CPTII,S$GLB,, | Performed by: FAMILY MEDICINE

## 2024-06-07 PROCEDURE — 3078F DIAST BP <80 MM HG: CPT | Mod: CPTII,S$GLB,, | Performed by: FAMILY MEDICINE

## 2024-06-07 RX ORDER — DOXEPIN HYDROCHLORIDE 10 MG/1
10 CAPSULE ORAL NIGHTLY PRN
Qty: 30 CAPSULE | Refills: 5 | Status: SHIPPED | OUTPATIENT
Start: 2024-06-07 | End: 2025-06-07

## 2024-06-18 VITALS
DIASTOLIC BLOOD PRESSURE: 76 MMHG | BODY MASS INDEX: 25.83 KG/M2 | SYSTOLIC BLOOD PRESSURE: 132 MMHG | OXYGEN SATURATION: 98 % | HEART RATE: 66 BPM | WEIGHT: 180 LBS | TEMPERATURE: 98 F

## 2024-06-18 NOTE — PROGRESS NOTES
Physical Exam  /76 (BP Location: Right arm, Patient Position: Sitting, BP Method: Medium (Manual))   Pulse 66   Temp 98.4 °F (36.9 °C) (Oral)   Wt 81.7 kg (180 lb 0.1 oz)   SpO2 98%   BMI 25.83 kg/m²      Office Visit    Patient Name: Sha Triplett    : 1950  MRN: [id number]      Assessment/Plan:  Sha Triplett is a 73 y.o. male who presents today for :    -Insomnia  -     doxepin (SINEQUAN) 10 MG capsule; Take 1 capsule (10 mg total) by mouth nightly as needed (insomnia).  Dispense: 30 capsule; Refill: 5  -may continue with Doxepin as needed as it seems to work better for him - reviewed with patient regarding the risks and benefits of sleeping medications in general and Doxepin in particular, which patient voices understanding. We also reviewed about pre-bedtime routine to assist with falling asleep. Continue with healthy diet and pursuing a regular physical exercise for physical and mental well-being to help with better, quality sleep. Avoid smoking/alcohol abuse/substance abuse.         -HTN  -HLD  -PAD (peripheral artery disease)  -continue current medication regimen  -DASH diet, regular cardiovascular exercises as above    BPH - stable off of Flomax            Follow up PRN      This note was created by combination of typed  and MModal dictation.  Transcription errors may be present.  If there are any questions, please contact me.      ----------------------------------------------------------------------------------------------------------------------      HPI:  Patient Care Team:  Jude Kee MD as PCP - General (Family Medicine)  Fly Vazquez MD as Consulting Physician (Urology)  Filiberto Varela OD as Consulting Physician (Optometry)    Sha is a 73 y.o. male with      Patient Active Problem List   Diagnosis    -HTN    Hep C w/o coma, chronic    BPH - stable off of Flomax    Nocturia    -Prostate cancer - s/p Prostate Bx 19 - s/p HIFU 21     Decreased ROM of intervertebral discs of thoracic spine    Thoracic spine pain    Overweight (BMI 25.0-29.9)    -Other male erectile dysfunction - controlled on PRN Viagra    Refractive error    Nuclear sclerosis, bilateral    Cortical age-related cataract of both eyes    -Hepatitis C virus infection - cured s/p antiviral treatment - SVR12 - 1/2022 (g1a, F2-3)    -Insomnia    -PAD (peripheral artery disease)    Chronic insomnia    -HLD       Sha presents today for follow up Insomnia      He was previously on Ambien, which helped initially but finds that gradually it has become ineffective. He denies any side effects, but wished to stop taking Ambien as it no longer seems to help. He had tried Doxepin from an old prescription and finds that it works better and would like to renew his Rx. He otherwise denies any CP/SOB/HAINES/palpitations/claudication/depression/substance abuse. His BP is controlled on current regimen.       Additional ROS      Negative review of system  except per HPI               Patient Active Problem List   Diagnosis    -HTN    Hep C w/o coma, chronic    BPH - stable off of Flomax    Nocturia    -Prostate cancer - s/p Prostate Bx 11/25/19 - s/p HIFU 7/6/21    Decreased ROM of intervertebral discs of thoracic spine    Thoracic spine pain    Overweight (BMI 25.0-29.9)    -Other male erectile dysfunction - controlled on PRN Viagra    Refractive error    Nuclear sclerosis, bilateral    Cortical age-related cataract of both eyes    -Hepatitis C virus infection - cured s/p antiviral treatment - SVR12 - 1/2022 (g1a, F2-3)    -Insomnia    -PAD (peripheral artery disease)    Chronic insomnia    -HLD       Current Medications  Medications reviewed/updated.     Current Outpatient Medications on File Prior to Visit   Medication Sig Dispense Refill    b complex vitamins tablet Take 1 tablet by mouth once daily.      cetirizine (ZYRTEC) 10 MG tablet Take 1 tablet (10 mg total) by mouth every evening. 60 tablet  2    cyanocobalamin, vitamin B-12, 50 mcg tablet Take 50 mcg by mouth once daily.      fish oil-omega-3 fatty acids 300-1,000 mg capsule Take 2 g by mouth once daily.      fluticasone propionate (FLONASE) 50 mcg/actuation nasal spray SHAKE LIQUID AND USE 1 SPRAY(50 MCG) IN EACH NOSTRIL TWICE DAILY AS NEEDED FOR RHINITIS 48 g 0    HYDROcodone-acetaminophen (NORCO) 7.5-325 mg per tablet Take by mouth.      milk thistle 175 mg tablet Take 175 mg by mouth once daily.      multivitamin capsule Take 1 capsule by mouth once daily.      PSYLLIUM SEED, WITH DEXTROSE, (CILIUM ORAL) Take by mouth.      traZODone (DESYREL) 50 MG tablet       zolpidem (AMBIEN) 10 mg Tab Take 1 tablet (10 mg total) by mouth nightly as needed (insomnia). 30 tablet 3    amLODIPine (NORVASC) 5 MG tablet Take 1 tablet (5 mg total) by mouth once daily. Followup Dr.T Kee 2023 for more refills (Patient not taking: Reported on 2023) 90 tablet 1    atorvastatin (LIPITOR) 10 MG tablet Take 1 tablet (10 mg total) by mouth once daily. (Patient not taking: Reported on 2023) 90 tablet 3     No current facility-administered medications on file prior to visit.           Past Surgical History:   Procedure Laterality Date    ABLATION, PROSTATE, HIGH INTENSITY FOCUSED ULTRASOUND (HIFU)  2021    Procedure: ABLATION,PROSTATE,HIGH INTENSITY FOCUSED ULTRASOUND (HIFU);  Surgeon: David Rhodes MD;  Location: [facility] OR [address] [place] [address];  Service: Urology;;    COLONOSCOPY W/ BIOPSIES  3/4/15    / Dr. Ellis at [facility].    LIVER BIOPSY      trus/bx       trus/bx          Family History   Problem Relation Name Age of Onset    Alzheimer's disease Mother mother     Macular degeneration Father father     Cataracts Father father     Blindness Father father     Alzheimer's disease Father father          35 days after pt's mother    Pulmonary fibrosis Brother Hunter     COPD Brother Hunter     Pneumonia Brother Stephen 3    Macular degeneration Paternal  "Aunt x3     Other Paternal Uncle x4         possibly 1-2 with prostate CA    Other Maternal Grandmother mgm         hx?    Other Maternal Grandfather mgf         "diseases in his body"    Other Paternal Grandmother pgm         hx?    Prostate cancer Paternal Grandfather pgf         POSSIBLE (heard "rumors")    Prostate cancer Paternal Cousin p1c 62        s/p radioactive pellets    Amblyopia Neg Hx      Diabetes Neg Hx      Glaucoma Neg Hx      Hypertension Neg Hx      Retinal detachment Neg Hx      Strabismus Neg Hx      Stroke Neg Hx      Thyroid disease Neg Hx      Colon polyps Neg Hx         Social History     Socioeconomic History    Marital status:    Tobacco Use    Smoking status: Former     Current packs/day: 0.50     Average packs/day: 0.5 packs/day for 20.0 years (10.0 ttl pk-yrs)     Types: Cigarettes    Smokeless tobacco: Former    Tobacco comments:     Quit smoking about 10 years ago    Substance and Sexual Activity    Alcohol use: No    Drug use: Yes     Types: Marijuana     Comment: past use of pot and IV drug use after [place]    Sexual activity: Not Currently   Social History Narrative     x 2.  Two bio kids. One adopted.   Retired [employer].  Now a non smoker.  [employer] [place] .       Social Determinants of Health     Financial Resource Strain: Low Risk  (12/29/2023)    Overall Financial Resource Strain (CARDIA)     Difficulty of Paying Living Expenses: Not hard at all   Food Insecurity: No Food Insecurity (12/29/2023)    Hunger Vital Sign     Worried About Running Out of Food in the Last Year: Never true     Ran Out of Food in the Last Year: Never true   Transportation Needs: No Transportation Needs (12/29/2023)    PRAPARE - Transportation     Lack of Transportation (Medical): No     Lack of Transportation (Non-Medical): No   Physical Activity: Sufficiently Active (12/29/2023)    Exercise Vital Sign     Days of Exercise per Week: 4 days     Minutes of Exercise per Session: 50 min "   Stress: Stress Concern Present (12/29/2023)    [employer] [place] [employer] - Occupational Stress Questionnaire     Feeling of Stress : To some extent   Housing Stability: Low Risk  (12/29/2023)    Housing Stability Vital Sign     Unable to Pay for Housing in the Last Year: No     Number of Places Lived in the Last Year: 1     Unstable Housing in the Last Year: No             Allergies   Review of patient's allergies indicates:   Allergen Reactions    Sinus allergy Shortness Of Breath             Review of Systems  See HPI      [unfilled]  /76 (BP Location: Right arm, Patient Position: Sitting, BP Method: Medium (Manual))   Pulse 66   Temp 98.4 °F (36.9 °C) (Oral)   Wt 81.7 kg (180 lb 0.1 oz)   SpO2 98%   BMI 25.83 kg/m²       GEN: NAD, pleasant  HEENT: NCAT, PERRLA, EOMI, sclera clear, anicteric  NECK: normal, supple  LUNGS: CTAB, no w/r/r, normal respiratory effort  HEART: RRR, normal S1 and S2, no m/r/g, no palpitations, no edema  ABD: s/nt/nd, NABS, no organomegaly  SKIN: warm and dry with normal turgor, no rashes, no other lesions.   PSYCH: AOx3, appropriate mood and affect.   MSK: extremities warm/well perfused, normal ROM in all 4 extremities, no c/c/e.   NEURO: normal without focal findings, CN II-XII are intact

## 2024-08-12 ENCOUNTER — PATIENT OUTREACH (OUTPATIENT)
Dept: ADMINISTRATIVE | Facility: HOSPITAL | Age: 74
End: 2024-08-12
Payer: MEDICARE

## 2024-08-13 ENCOUNTER — LAB VISIT (OUTPATIENT)
Dept: LAB | Facility: HOSPITAL | Age: 74
End: 2024-08-13
Attending: UROLOGY
Payer: MEDICARE

## 2024-08-13 DIAGNOSIS — C61 PROSTATE CANCER: ICD-10-CM

## 2024-08-13 LAB — COMPLEXED PSA SERPL-MCNC: 7 NG/ML (ref 0–4)

## 2024-08-13 PROCEDURE — 36415 COLL VENOUS BLD VENIPUNCTURE: CPT | Mod: PO | Performed by: UROLOGY

## 2024-08-13 PROCEDURE — 84153 ASSAY OF PSA TOTAL: CPT | Performed by: UROLOGY

## 2024-08-14 NOTE — PROGRESS NOTES
Clinic Note  8/14/2024      Subjective:         Chief Complaint:   HPI  Sha Triplett is a 74 y.o. male with prostate cancer.  [place] vet, retired from [employer]  HIFU (high intensity focused ultrasound) 7/6/2021- right theodora-ablation. PSA has declined 9.3 to 3.6.      MRI 2/22/2022- 21 ccs, HIFU (high intensity focused ultrasound) post treatment effect, 0.7 cm RAantPZ PI-RADS 3.     Doing well s/p HIFU. Had some irritative voiding symptoms and foul smelling around 8/3, treated empirically with cipro.     FH - paternal cousin, uncle, paternal grandfather  PSA - 9.3  Stage - T1C  Volume - 38 ccs  MRI - 1/24/2020- L1- right apex 0.8 cm PI-RADS 4 lesion, L2 right apex/M 1.2 cm PI-RADS 3 lesion. Negative extra prostatic extension, neurovascular bundle involvement, SVI.  Biopsy - 11/26/2018 [place] 6 right apex 1/1 5%. 1/12 cores positive.  11/25/2019- [place] 4+3 RML 1/1 5%, right middle 1/1 1%, RAL 1/1 2%, right apex 1/1 2%. Overall 4/12 cores positive.  1/4/2021 [place] 3+4 RAL 1/1 20%, right apex 1/1 35%; Richie 6 RBL 1/1 10%, right base 1%, RML 10 %, right mid  20 %. Overall 6/12+.  CAMILLE Score - 6 high risk  NCCN - unfavorable intermediate  Nodes positive risk MSKCC-13%  Germline testing -discussed, interested  Somatic testing -discussed     8/25/2022- PSA 5.5. MRI 20 ccs, stable appearance of right theodora-ablation zone with no evidence of recurrence. Stable 0.6 cm LAPZ PI-RADS 3 lesion.  Had PSA 3.5 on 8/1/2022.  Discussed AS (active surveillance) vs re-biopsy and targeting left lesion.     8/17/2023- PSA 5.0.  LUTS- nocturia 1x/noc, decreased force of stream, empties completely.  Discussed  surveillance, PSA, MRI, re-biopsy.  [employer].     2/15/2024- PSA 7.4  MRI- 22 ccs, stable ablative zone on the right; stable 0.6 cm PI-RADS 3 lesion LAPZ is stable.     8/15/2024- PSA 7.0. Using hyperbaric chamber that he purchased.      Lab Results   Component Value Date    PSADIAG 7.0 (H) 08/13/2024    PSADIAG 7.4 (H)  "2024    PSADIAG 5.0 (H) 2023    PSADIAG 4.4 (H) 2023    PSADIAG 5.5 (H) 2022    PSADIAG 3.3 2022    PSADIAG 3.6 2021    PSADIAG 9.3 (H) 2021    PSADIAG 6.8 (H) 11/10/2020    PSADIAG 5.7 (H) 2020      Past Medical History:   Diagnosis Date    -HLD 2024    -Insomnia 2022    -PAD (peripheral artery disease) 2022    Hepatitis C virus infection cured after antiviral drug therapy     s/p Rx (treated / cured ) - SVR12 - 2022 (g1a, F2-3)    History of posttraumatic stress disorder (PTSD)     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    Other Maternal Grandfather mgf         "diseases in his body"    Other Paternal Grandmother pgm         hx?    Prostate cancer Paternal Grandfather pgf         POSSIBLE (heard "rumors")    Prostate cancer Paternal Cousin p1c 62        s/p radioactive pellets    Amblyopia Neg Hx      Diabetes Neg Hx      Glaucoma Neg Hx      Hypertension Neg Hx      Retinal detachment Neg Hx      Strabismus Neg Hx      Stroke Neg Hx      Thyroid disease Neg Hx      Colon polyps Neg Hx       Social History     Socioeconomic History    Marital status:    Tobacco Use    Smoking status: Former     Current packs/day: 0.50     Average packs/day: 0.5 packs/day for 20.0 years (10.0 ttl pk-yrs)     Types: Cigarettes    Smokeless tobacco: Former    " Tobacco comments:     Quit smoking about 10 years ago    Substance and Sexual Activity    Alcohol use: No    Drug use: Yes     Types: Marijuana     Comment: past use of pot and IV drug use after [place]    Sexual activity: Not Currently   Social History Narrative     x 2.  Two bio kids. One adopted.   Retired [employer].  Now a non smoker.  [employer] .       Social Determinants of Health     Financial Resource Strain: Low Risk  (12/29/2023)    Overall Financial Resource Strain (CARDIA)     Difficulty of Paying Living Expenses: Not hard at all   Food Insecurity: No Food Insecurity (12/29/2023)    Hunger Vital Sign     Worried About Running Out of Food in the Last Year: Never true     Ran Out of Food in the Last Year: Never true   Transportation Needs: No Transportation Needs (12/29/2023)    PRAPARE - Transportation     Lack of Transportation (Medical): No     Lack of Transportation (Non-Medical): No   Physical Activity: Sufficiently Active (12/29/2023)    Exercise Vital Sign     Days of Exercise per Week: 4 days     Minutes of Exercise per Session: 50 min   Stress: Stress Concern Present (12/29/2023)    [employer] [place] [employer]ational [employer] - Occupational Stress Questionnaire     Feeling of Stress : To some extent   Housing Stability: Low Risk  (12/29/2023)    Housing Stability Vital Sign     Unable to Pay for Housing in the Last Year: No     Number of Places Lived in the Last Year: 1     Unstable Housing in the Last Year: No     Past Surgical History:   Procedure Laterality Date    ABLATION, PROSTATE, HIGH INTENSITY FOCUSED ULTRASOUND (HIFU)  7/6/2021    Procedure: ABLATION,PROSTATE,HIGH INTENSITY FOCUSED ULTRASOUND (HIFU);  Surgeon: David Rhodes MD;  Location: [address];  Service: Urology;;    COLONOSCOPY W/ BIOPSIES  3/4/15    / Dr. Ellis at [facility].    LIVER BIOPSY  1998    trus/bx 2018      trus/bx 2019       Patient Active Problem List   Diagnosis    -HTN    Hep C w/o coma, chronic    BPH -  "stable off of Flomax    Nocturia    -Prostate cancer - s/p Prostate Bx 11/25/19 - s/p HIFU 7/6/21    Decreased ROM of intervertebral discs of thoracic spine    Thoracic spine pain    Overweight (BMI 25.0-29.9)    -Other male erectile dysfunction - controlled on PRN Viagra    Refractive error    Nuclear sclerosis, bilateral    Cortical age-related cataract of both eyes    -Hepatitis C virus infection - cured s/p antiviral treatment - SVR12 - 1/2022 (g1a, F2-3)    -Insomnia    -PAD (peripheral artery disease)    Chronic insomnia    -HLD     Review of Systems      Objective:      There were no vitals taken for this visit.  Estimated body mass index is 25.83 kg/m² as calculated from the following:    Height as of 2/15/24: 5' 10" (1.778 m).    Weight as of 6/7/24: 81.7 kg (180 lb 0.1 oz).  Physical Exam      Assessment and Plan:           Problem List Items Addressed This Visit       -Prostate cancer - s/p Prostate Bx 11/25/19 - s/p HIFU 7/6/21 - Primary       Follow up:   F/U 1 year with PSA.    David Rhodes          "

## 2024-08-15 ENCOUNTER — OFFICE VISIT (OUTPATIENT)
Dept: UROLOGY | Facility: CLINIC | Age: 74
End: 2024-08-15
Payer: MEDICARE

## 2024-08-15 VITALS
BODY MASS INDEX: 26.45 KG/M2 | HEART RATE: 60 BPM | SYSTOLIC BLOOD PRESSURE: 148 MMHG | DIASTOLIC BLOOD PRESSURE: 70 MMHG | HEIGHT: 70 IN | WEIGHT: 184.75 LBS

## 2024-08-15 DIAGNOSIS — F51.04 CHRONIC INSOMNIA: ICD-10-CM

## 2024-08-15 DIAGNOSIS — C61 PROSTATE CANCER: Primary | ICD-10-CM

## 2024-08-15 PROCEDURE — 99999 PR PBB SHADOW E&M-EST. PATIENT-LVL III: CPT | Mod: PBBFAC,,, | Performed by: UROLOGY

## 2024-09-25 ENCOUNTER — DOCUMENTATION ONLY (OUTPATIENT)
Dept: INTERNAL MEDICINE | Facility: CLINIC | Age: 74
End: 2024-09-25
Payer: MEDICARE

## 2024-09-25 NOTE — PROGRESS NOTES
Patient unable to connect, video or audio (failed hardware test). I contacted pt via number on file, told him someone would be calling to try to help troubleshoot. They reported no answer on one line and busy signal on other. I also attempted to reach pt with same results. I did LVM to call back. Also, information sent to  to reschedule to in person visit.

## 2024-12-11 ENCOUNTER — PATIENT OUTREACH (OUTPATIENT)
Dept: ADMINISTRATIVE | Facility: HOSPITAL | Age: 74
End: 2024-12-11
Payer: MEDICARE

## 2024-12-11 VITALS — DIASTOLIC BLOOD PRESSURE: 65 MMHG | SYSTOLIC BLOOD PRESSURE: 137 MMHG

## 2024-12-11 DIAGNOSIS — Z12.12 ENCOUNTER FOR SCREENING FOR COLORECTAL MALIGNANT NEOPLASM: Primary | ICD-10-CM

## 2024-12-11 DIAGNOSIS — Z12.11 ENCOUNTER FOR SCREENING FOR COLORECTAL MALIGNANT NEOPLASM: Primary | ICD-10-CM

## 2024-12-19 ENCOUNTER — PATIENT MESSAGE (OUTPATIENT)
Dept: ADMINISTRATIVE | Facility: HOSPITAL | Age: 74
End: 2024-12-19
Payer: MEDICARE

## 2025-02-19 DIAGNOSIS — I10 ESSENTIAL HYPERTENSION: ICD-10-CM

## 2025-03-12 ENCOUNTER — OFFICE VISIT (OUTPATIENT)
Dept: OPTOMETRY | Facility: CLINIC | Age: 75
End: 2025-03-12
Payer: MEDICARE

## 2025-03-12 DIAGNOSIS — H25.013 CORTICAL AGE-RELATED CATARACT OF BOTH EYES: ICD-10-CM

## 2025-03-12 DIAGNOSIS — H52.7 REFRACTIVE ERROR: ICD-10-CM

## 2025-03-12 DIAGNOSIS — H04.123 DRY EYE SYNDROME, BILATERAL: ICD-10-CM

## 2025-03-12 DIAGNOSIS — H25.13 NUCLEAR SCLEROSIS, BILATERAL: Primary | ICD-10-CM

## 2025-03-12 PROCEDURE — 1160F RVW MEDS BY RX/DR IN RCRD: CPT | Mod: CPTII,S$GLB,, | Performed by: OPTOMETRIST

## 2025-03-12 PROCEDURE — 92015 DETERMINE REFRACTIVE STATE: CPT | Mod: S$GLB,,, | Performed by: OPTOMETRIST

## 2025-03-12 PROCEDURE — 3288F FALL RISK ASSESSMENT DOCD: CPT | Mod: CPTII,S$GLB,, | Performed by: OPTOMETRIST

## 2025-03-12 PROCEDURE — 1159F MED LIST DOCD IN RCRD: CPT | Mod: CPTII,S$GLB,, | Performed by: OPTOMETRIST

## 2025-03-12 PROCEDURE — 92014 COMPRE OPH EXAM EST PT 1/>: CPT | Mod: S$GLB,,, | Performed by: OPTOMETRIST

## 2025-03-12 PROCEDURE — 1126F AMNT PAIN NOTED NONE PRSNT: CPT | Mod: CPTII,S$GLB,, | Performed by: OPTOMETRIST

## 2025-03-12 PROCEDURE — 1101F PT FALLS ASSESS-DOCD LE1/YR: CPT | Mod: CPTII,S$GLB,, | Performed by: OPTOMETRIST

## 2025-03-12 PROCEDURE — 99999 PR PBB SHADOW E&M-EST. PATIENT-LVL III: CPT | Mod: PBBFAC,,, | Performed by: OPTOMETRIST

## 2025-03-12 NOTE — PROGRESS NOTES
Subjective:       Patient ID: Sha Triplett is a 74 y.o. male      Chief Complaint   Patient presents with    Concerns About Ocular Health     History of Present Illness  Dls: 2/28/24 Dr. Delvalle     75 y/o male presents today for ocular health check  Pt c/o dry eyes ou off/on. Pt c/o blurry vision at distance ou.  Pt wears bifocal glasses.     No tearing   + ou off/on itching   No burning   No pain   No ha's   + ou off/on floaters   No flashes     Eye meds   Otc gtts ou prn     Pohx:   None     Fohx:   MD- aunt, father   Cat        Assessment/Plan:     1. Nuclear sclerosis, bilateral (Primary)  2. Cortical age-related cataract of both eyes  Educated pt on presence of cataracts and effects on vision. No surgery at this time. Recheck in one year, sooner PRN.    3. Dry eye syndrome, bilateral  Recommend Refresh/Systane BID - QID OU PRN. Warm compresses daily. Discussed chronicity.    4. Refractive error  Educated patient on refractive error and discussed lens options. Dispensed updated spectacle Rx. Educated about adaptation period to new specs.    Eyeglass Final Rx       Eyeglass Final Rx         Sphere Cylinder Axis Add    Right -1.50 +1.75 180 +2.75    Left -1.75 +0.75 170 +2.75      Expiration Date: 3/12/2026                      Follow up in about 1 year (around 3/12/2026).

## 2025-03-14 ENCOUNTER — PATIENT MESSAGE (OUTPATIENT)
Dept: FAMILY MEDICINE | Facility: CLINIC | Age: 75
End: 2025-03-14
Payer: MEDICARE

## 2025-03-17 ENCOUNTER — PATIENT MESSAGE (OUTPATIENT)
Dept: HEMATOLOGY/ONCOLOGY | Facility: CLINIC | Age: 75
End: 2025-03-17
Payer: MEDICARE

## 2025-03-24 ENCOUNTER — PATIENT MESSAGE (OUTPATIENT)
Dept: OPTOMETRY | Facility: CLINIC | Age: 75
End: 2025-03-24
Payer: MEDICARE

## 2025-05-04 ENCOUNTER — PATIENT MESSAGE (OUTPATIENT)
Dept: OPTOMETRY | Facility: CLINIC | Age: 75
End: 2025-05-04
Payer: MEDICARE

## 2025-05-04 DIAGNOSIS — J30.89 NON-SEASONAL ALLERGIC RHINITIS DUE TO OTHER ALLERGIC TRIGGER: ICD-10-CM

## 2025-05-04 NOTE — TELEPHONE ENCOUNTER
No care due was identified.  [facility] Embedded Care Due Messages. Reference number: [id number].   5/04/2025 2:38:25 PM CDT

## 2025-05-05 RX ORDER — CETIRIZINE HYDROCHLORIDE 10 MG/1
10 TABLET ORAL NIGHTLY
Qty: 60 TABLET | Refills: 2 | Status: SHIPPED | OUTPATIENT
Start: 2025-05-05

## 2025-05-05 RX ORDER — TRAZODONE HYDROCHLORIDE 50 MG/1
50 TABLET ORAL NIGHTLY PRN
Qty: 60 TABLET | Refills: 0 | Status: SHIPPED | OUTPATIENT
Start: 2025-05-05

## 2025-05-05 NOTE — TELEPHONE ENCOUNTER
Refill Routing Note   Medication(s) are not appropriate for processing by [facility] for the following reason(s):        No active prescription written by provider  Due for refill >6 months ago    [facility] action(s):  Defer               Appointments  past 12m or future 3m with PCP    Date Provider   Last Visit   6/7/2024 Jude Kee MD   Next Visit   Visit date not found Jude Kee MD   ED visits in past 90 days: 0        Note composed:5:35 AM 05/05/2025

## 2025-05-06 ENCOUNTER — OFFICE VISIT (OUTPATIENT)
Dept: OPTOMETRY | Facility: CLINIC | Age: 75
End: 2025-05-06
Payer: MEDICARE

## 2025-05-06 ENCOUNTER — LAB VISIT (OUTPATIENT)
Dept: LAB | Facility: HOSPITAL | Age: 75
End: 2025-05-06
Attending: FAMILY MEDICINE
Payer: MEDICARE

## 2025-05-06 DIAGNOSIS — I10 ESSENTIAL HYPERTENSION: ICD-10-CM

## 2025-05-06 DIAGNOSIS — Z46.0 FITTING AND ADJUSTMENT OF SPECTACLES AND CONTACT LENSES: Primary | ICD-10-CM

## 2025-05-06 LAB
ALBUMIN SERPL BCP-MCNC: 4.1 G/DL (ref 3.5–5.2)
ALP SERPL-CCNC: 56 UNIT/L (ref 40–150)
ALT SERPL W/O P-5'-P-CCNC: 16 UNIT/L (ref 10–44)
ANION GAP (OHS): 7 MMOL/L (ref 8–16)
AST SERPL-CCNC: 21 UNIT/L (ref 11–45)
BILIRUB SERPL-MCNC: 1.1 MG/DL (ref 0.1–1)
BUN SERPL-MCNC: 19 MG/DL (ref 8–23)
CALCIUM SERPL-MCNC: 9.4 MG/DL (ref 8.7–10.5)
CHLORIDE SERPL-SCNC: 104 MMOL/L (ref 95–110)
CO2 SERPL-SCNC: 26 MMOL/L (ref 23–29)
CREAT SERPL-MCNC: 0.8 MG/DL (ref 0.5–1.4)
GFR SERPLBLD CREATININE-BSD FMLA CKD-EPI: >60 ML/MIN/1.73/M2
GLUCOSE SERPL-MCNC: 94 MG/DL (ref 70–110)
POTASSIUM SERPL-SCNC: 4.8 MMOL/L (ref 3.5–5.1)
PROT SERPL-MCNC: 7 GM/DL (ref 6–8.4)
SODIUM SERPL-SCNC: 137 MMOL/L (ref 136–145)

## 2025-05-06 PROCEDURE — 80053 COMPREHEN METABOLIC PANEL: CPT

## 2025-05-06 PROCEDURE — 99499 UNLISTED E&M SERVICE: CPT | Mod: S$GLB,,, | Performed by: OPTOMETRIST

## 2025-05-06 PROCEDURE — 99999 PR PBB SHADOW E&M-EST. PATIENT-LVL I: CPT | Mod: PBBFAC,,, | Performed by: OPTOMETRIST

## 2025-05-06 PROCEDURE — 36415 COLL VENOUS BLD VENIPUNCTURE: CPT | Mod: PO

## 2025-05-06 NOTE — PROGRESS NOTES
Subjective:       Patient ID: Sha Triplett is a 74 y.o. male      Chief Complaint   Patient presents with    Concerns About Ocular Health     History of Present Illness  Dls: 3/12/25 Dr. Delvalle     73 y/o male presents today with c/o blurry vision at distance with new bifocal glasses. Pt had glasses made at [facility].     Assessment/Plan:     1. Fitting and adjustment of spectacles and contact lenses (Primary)  Pt likes stronger Rx. Updated MRx for remake.    Eyeglass Final Rx       Eyeglass Final Rx         Sphere Cylinder Axis Add    Right -2.00 +1.75 180 +2.75    Left -2.25 +0.75 170 +2.75      Expiration Date: 5/6/2026    Comments: [facility]

## 2025-08-11 ENCOUNTER — LAB VISIT (OUTPATIENT)
Dept: LAB | Facility: HOSPITAL | Age: 75
End: 2025-08-11
Attending: UROLOGY
Payer: MEDICARE

## 2025-08-11 DIAGNOSIS — C61 PROSTATE CANCER: ICD-10-CM

## 2025-08-11 LAB — PSA SERPL-MCNC: 11.96 NG/ML

## 2025-08-11 PROCEDURE — 84153 ASSAY OF PSA TOTAL: CPT

## 2025-08-11 PROCEDURE — 36415 COLL VENOUS BLD VENIPUNCTURE: CPT | Mod: PO

## 2025-08-14 ENCOUNTER — OFFICE VISIT (OUTPATIENT)
Dept: UROLOGY | Facility: CLINIC | Age: 75
End: 2025-08-14
Payer: MEDICARE

## 2025-08-14 VITALS
BODY MASS INDEX: 27.34 KG/M2 | SYSTOLIC BLOOD PRESSURE: 128 MMHG | HEART RATE: 63 BPM | HEIGHT: 70 IN | WEIGHT: 190.94 LBS | DIASTOLIC BLOOD PRESSURE: 67 MMHG

## 2025-08-14 DIAGNOSIS — C61 PROSTATE CANCER: Primary | ICD-10-CM

## 2025-08-14 PROCEDURE — 99999 PR PBB SHADOW E&M-EST. PATIENT-LVL IV: CPT | Mod: PBBFAC,,, | Performed by: UROLOGY

## 2025-08-14 RX ORDER — CEFTRIAXONE 1 G/1
1 INJECTION, POWDER, FOR SOLUTION INTRAMUSCULAR; INTRAVENOUS
Status: SHIPPED | OUTPATIENT
Start: 2025-08-14 | End: 2025-08-15

## 2025-08-14 RX ORDER — CIPROFLOXACIN 500 MG/1
500 TABLET, FILM COATED ORAL ONCE
Qty: 1 TABLET | Refills: 0 | Status: SHIPPED | OUTPATIENT
Start: 2025-08-14 | End: 2025-08-14

## 2025-08-20 ENCOUNTER — PATIENT MESSAGE (OUTPATIENT)
Dept: UROLOGY | Facility: CLINIC | Age: 75
End: 2025-08-20
Payer: MEDICARE

## 2025-08-20 ENCOUNTER — NURSE TRIAGE (OUTPATIENT)
Dept: ADMINISTRATIVE | Facility: CLINIC | Age: 75
End: 2025-08-20
Payer: MEDICARE

## 2025-08-26 ENCOUNTER — HOSPITAL ENCOUNTER (OUTPATIENT)
Dept: RADIOLOGY | Facility: HOSPITAL | Age: 75
Discharge: HOME | End: 2025-08-26
Attending: UROLOGY
Payer: MEDICARE

## 2025-08-26 DIAGNOSIS — C61 PROSTATE CANCER: ICD-10-CM

## 2025-08-26 LAB
CREAT SERPL-MCNC: 1 MG/DL (ref 0.5–1.4)
SAMPLE: NORMAL

## 2025-08-26 PROCEDURE — 72197 MRI PELVIS W/O & W/DYE: CPT | Mod: TC

## 2025-08-26 PROCEDURE — A9585 GADOBUTROL INJECTION: HCPCS | Performed by: UROLOGY

## 2025-08-26 PROCEDURE — 72197 MRI PELVIS W/O & W/DYE: CPT | Mod: 26,,, | Performed by: INTERNAL MEDICINE

## 2025-08-26 PROCEDURE — 25500020 PHARM REV CODE 255: Performed by: UROLOGY

## 2025-08-26 RX ORDER — GADOBUTROL 604.72 MG/ML
10 INJECTION INTRAVENOUS
Status: COMPLETED | OUTPATIENT
Start: 2025-08-26 | End: 2025-08-26

## 2025-08-26 RX ADMIN — GADOBUTROL 10 ML: 604.72 INJECTION INTRAVENOUS at 02:08

## 2025-08-28 ENCOUNTER — HOSPITAL ENCOUNTER (OUTPATIENT)
Dept: RADIOLOGY | Facility: HOSPITAL | Age: 75
Discharge: HOME | End: 2025-08-28
Attending: UROLOGY
Payer: MEDICARE

## 2025-08-28 DIAGNOSIS — C61 PROSTATE CANCER: ICD-10-CM

## 2025-08-28 PROCEDURE — 78815 PET IMAGE W/CT SKULL-THIGH: CPT | Mod: TC,PS

## 2025-08-28 PROCEDURE — 78815 PET IMAGE W/CT SKULL-THIGH: CPT | Mod: 26,PI,, | Performed by: NUCLEAR MEDICINE

## 2025-08-28 PROCEDURE — A9596 HC GALLIUM GA-68 GOZETOTIDE, DX (ILLUCCIX), PER 1 MCI: HCPCS | Mod: TB | Performed by: UROLOGY

## 2025-08-28 RX ADMIN — KIT FOR THE PREPARATION OF GALLIUM GA 68 GOZETOTIDE INJECTION 5.17 MILLICURIE: KIT INTRAVENOUS at 02:08

## 2025-08-29 ENCOUNTER — TELEPHONE (OUTPATIENT)
Dept: UROLOGY | Facility: CLINIC | Age: 75
End: 2025-08-29
Payer: MEDICARE

## 2025-09-02 ENCOUNTER — PROCEDURE VISIT (OUTPATIENT)
Dept: UROLOGY | Facility: CLINIC | Age: 75
End: 2025-09-02
Payer: MEDICARE

## 2025-09-02 VITALS
HEART RATE: 69 BPM | SYSTOLIC BLOOD PRESSURE: 158 MMHG | RESPIRATION RATE: 17 BRPM | DIASTOLIC BLOOD PRESSURE: 72 MMHG | WEIGHT: 190.5 LBS | BODY MASS INDEX: 27.33 KG/M2 | TEMPERATURE: 98 F

## 2025-09-02 DIAGNOSIS — C61 PROSTATE CANCER: Primary | ICD-10-CM

## 2025-09-02 PROCEDURE — 88344 IMHCHEM/IMCYTCHM EA MLT ANTB: CPT | Mod: TC | Performed by: PATHOLOGY

## 2025-09-02 RX ORDER — LIDOCAINE HYDROCHLORIDE 10 MG/ML
20 INJECTION, SOLUTION INFILTRATION; PERINEURAL
Status: COMPLETED | OUTPATIENT
Start: 2025-09-02 | End: 2025-09-02

## 2025-09-02 RX ORDER — CEFTRIAXONE 1 G/1
1 INJECTION, POWDER, FOR SOLUTION INTRAMUSCULAR; INTRAVENOUS
Status: COMPLETED | OUTPATIENT
Start: 2025-09-02 | End: 2025-09-02

## 2025-09-02 RX ORDER — LIDOCAINE HYDROCHLORIDE 20 MG/ML
JELLY TOPICAL
Status: COMPLETED | OUTPATIENT
Start: 2025-09-02 | End: 2025-09-02

## 2025-09-02 RX ADMIN — LIDOCAINE HYDROCHLORIDE 20 ML: 10 INJECTION, SOLUTION INFILTRATION; PERINEURAL at 10:09

## 2025-09-02 RX ADMIN — CEFTRIAXONE 1 G: 1 INJECTION, POWDER, FOR SOLUTION INTRAMUSCULAR; INTRAVENOUS at 10:09

## 2025-09-02 RX ADMIN — LIDOCAINE HYDROCHLORIDE 10 ML: 20 JELLY TOPICAL at 10:09

## 2025-09-04 LAB
ESTROGEN SERPL-MCNC: NORMAL PG/ML
INSULIN SERPL-ACNC: NORMAL U[IU]/ML
LAB AP CLINICAL INFORMATION: NORMAL
LAB AP GROSS DESCRIPTION: NORMAL
LAB AP PERFORMING LOCATION(S): NORMAL
LAB AP REPORT FOOTNOTES: NORMAL

## (undated) DEVICE — TRAY FOLEY 16FR INFECTION CONT

## (undated) DEVICE — KIT FOCALPAK HIFU